# Patient Record
Sex: MALE | Race: OTHER | NOT HISPANIC OR LATINO | Employment: UNEMPLOYED | URBAN - METROPOLITAN AREA
[De-identification: names, ages, dates, MRNs, and addresses within clinical notes are randomized per-mention and may not be internally consistent; named-entity substitution may affect disease eponyms.]

---

## 2023-04-10 DIAGNOSIS — E78.5 HYPERLIPIDEMIA, UNSPECIFIED HYPERLIPIDEMIA TYPE: Primary | ICD-10-CM

## 2023-04-10 RX ORDER — ROSUVASTATIN CALCIUM 10 MG/1
10 TABLET, COATED ORAL NIGHTLY
Qty: 90 TABLET | Refills: 1 | Status: SHIPPED | OUTPATIENT
Start: 2023-04-10 | End: 2023-09-12

## 2023-04-10 RX ORDER — ROSUVASTATIN CALCIUM 10 MG/1
10 TABLET, COATED ORAL NIGHTLY
COMMUNITY
Start: 2022-02-17 | End: 2023-04-10 | Stop reason: SDUPTHER

## 2023-06-20 LAB
COMPLEMENT C3 (MG/DL) IN SER/PLAS: 145 MG/DL (ref 87–200)
COMPLEMENT C4 (MG/DL) IN SER/PLAS: 49 MG/DL (ref 10–50)

## 2023-06-22 LAB
ALBUMIN ELP: 4.6 G/DL (ref 3.4–5)
ALBUMIN/PROTEIN TOTAL (%) IN URINE BY ELECTROPHORESIS: 41.8 %
ALPHA 1 GLOBULIN/PROTEIN TOTAL (%) IN URINE BY ELECTROPHORESIS: 13.2 %
ALPHA 1: 0.3 G/DL (ref 0.2–0.6)
ALPHA 2 GLOBULIN/PROTEIN TOTAL (%) IN URINE BY ELECTROPHORESIS: 14.1 %
ALPHA 2: 0.6 G/DL (ref 0.4–1.1)
BETA GLOBULIN/PROTEIN TOTAL (%) IN URINE BY ELECTROPHORESIS: 20.5 %
BETA: 0.9 G/DL (ref 0.5–1.2)
GAMMA GLOBULIN/PROTEIN TOTAL (%) IN URINE BY ELECTROPHORESIS: 10.4 %
GAMMA GLOBULIN: 1.1 G/DL (ref 0.5–1.4)
IMMUNOFIXATION INTERPRETATION: NORMAL
PATH REVIEW - SERUM IMMUNOFIXATION: NORMAL
PATH REVIEW - URINE IMMUNOFIXATION: NORMAL
PATH REVIEW-SERUM PROTEIN ELECTROPHORESIS: NORMAL
PATH REVIEW-URINE PROTEIN ELECTROPHORESIS: NORMAL
PROTEIN (MG/DL) IN URINE: 12 MG/DL (ref 5–25)
PROTEIN ELECTROPHORESIS INTERPRETATION: NORMAL
PROTEIN TOTAL: 7.6 G/DL (ref 6.4–8.2)
SERUM IMMUNOFIXATION INTERPRETATION: NORMAL
UPEP INTERPRETATION: NORMAL

## 2023-06-23 LAB — GLOMERULAR BASEMENT MEMBRANE ANTIBODY: 0 AU/ML (ref 0–19)

## 2023-06-24 LAB
ANCA IFA PATTERN: NORMAL
ANCA IFA TITER: NORMAL
MYELOPEROXIDASE (MPO) AB, IGG: 0 AU/ML (ref 0–19)
SERINE PROTEINASE 3 (PR3) AB, IGG: 0 AU/ML (ref 0–19)

## 2023-06-27 LAB
CRYOCRIT IMMUNODIFFUSION (SJC): NORMAL
CRYOCRIT IMMUNOFIXATION (SJC): NORMAL
CRYOGLOBULIN  (SJC): NEGATIVE
PERCENT CRYOCRIT (SJC): NORMAL %
RHEUMATOID FACTOR (SJC): NORMAL IU/ML

## 2023-07-03 LAB
POC CREATININE: 0.7 MG/DL (ref 0.6–1.3)
POCT GFR - DATA CONVERSION: >60

## 2023-07-18 ENCOUNTER — APPOINTMENT (OUTPATIENT)
Dept: LAB | Facility: LAB | Age: 28
End: 2023-07-18
Payer: COMMERCIAL

## 2023-07-25 LAB
POC CREATININE: 0.6 MG/DL (ref 0.6–1.3)
POCT GFR - DATA CONVERSION: >60

## 2023-08-15 ENCOUNTER — HOSPITAL ENCOUNTER (OUTPATIENT)
Dept: DATA CONVERSION | Facility: HOSPITAL | Age: 28
End: 2023-08-15
Attending: SURGERY | Admitting: SURGERY
Payer: COMMERCIAL

## 2023-08-15 ENCOUNTER — HOSPITAL ENCOUNTER (OUTPATIENT)
Dept: DATA CONVERSION | Facility: HOSPITAL | Age: 28
End: 2023-08-15

## 2023-08-15 DIAGNOSIS — I72.2 ANEURYSM OF RENAL ARTERY (CMS-HCC): ICD-10-CM

## 2023-08-15 DIAGNOSIS — M31.4: ICD-10-CM

## 2023-09-11 DIAGNOSIS — E78.5 HYPERLIPIDEMIA, UNSPECIFIED HYPERLIPIDEMIA TYPE: ICD-10-CM

## 2023-09-12 RX ORDER — ROSUVASTATIN CALCIUM 10 MG/1
10 TABLET, COATED ORAL NIGHTLY
Qty: 90 TABLET | Refills: 1 | Status: SHIPPED | OUTPATIENT
Start: 2023-09-12 | End: 2023-12-28

## 2023-09-29 VITALS — BODY MASS INDEX: 19.32 KG/M2 | HEIGHT: 70 IN | WEIGHT: 134.92 LBS

## 2023-09-30 PROBLEM — E55.9 VITAMIN D DEFICIENCY: Status: ACTIVE | Noted: 2023-09-30

## 2023-09-30 PROBLEM — R74.8 ELEVATED LIVER ENZYMES: Status: ACTIVE | Noted: 2023-09-30

## 2023-09-30 PROBLEM — I70.1 RENAL ARTERY STENOSIS (CMS-HCC): Status: ACTIVE | Noted: 2023-09-30

## 2023-09-30 PROBLEM — R31.29 PERSISTENT MICROSCOPIC HEMATURIA: Status: ACTIVE | Noted: 2023-09-30

## 2023-09-30 PROBLEM — E78.5 HLD (HYPERLIPIDEMIA): Status: ACTIVE | Noted: 2023-09-30

## 2023-09-30 PROBLEM — Q87.81 ALPORT SYNDROME (HHS-HCC): Status: ACTIVE | Noted: 2023-09-30

## 2023-09-30 PROBLEM — M31.4: Status: ACTIVE | Noted: 2023-09-30

## 2023-09-30 PROBLEM — R20.2 PARESTHESIA OF RIGHT ARM: Status: ACTIVE | Noted: 2023-09-30

## 2023-09-30 PROBLEM — I10 HYPERTENSION, ESSENTIAL, BENIGN: Status: ACTIVE | Noted: 2023-09-30

## 2023-09-30 PROBLEM — M67.40 GANGLION CYST: Status: ACTIVE | Noted: 2023-09-30

## 2023-09-30 PROBLEM — I15.0 RENOVASCULAR HYPERTENSION: Status: ACTIVE | Noted: 2023-09-30

## 2023-09-30 PROBLEM — I72.2 RENAL ARTERIAL ANEURYSM (CMS-HCC): Status: ACTIVE | Noted: 2023-09-30

## 2023-09-30 PROBLEM — M35.9 CONNECTIVE TISSUE DISORDER (MULTI): Status: ACTIVE | Noted: 2023-09-30

## 2023-09-30 PROBLEM — E78.2 MIXED HYPERLIPIDEMIA: Status: ACTIVE | Noted: 2023-09-30

## 2023-09-30 PROBLEM — R31.29 OTHER MICROSCOPIC HEMATURIA: Status: ACTIVE | Noted: 2023-09-30

## 2023-09-30 PROBLEM — D17.1 LIPOMA OF TORSO: Status: ACTIVE | Noted: 2023-09-30

## 2023-09-30 PROBLEM — N28.1 RENAL CYST, LEFT: Status: ACTIVE | Noted: 2023-09-30

## 2023-09-30 PROBLEM — N02.9 RECURRENT MICROSCOPIC HEMATURIA: Status: ACTIVE | Noted: 2023-09-30

## 2023-09-30 PROBLEM — I10 HYPERTENSION: Status: ACTIVE | Noted: 2023-09-30

## 2023-09-30 PROBLEM — F41.1 GAD (GENERALIZED ANXIETY DISORDER): Status: ACTIVE | Noted: 2023-09-30

## 2023-09-30 PROBLEM — I10 WHITE COAT SYNDROME WITH HYPERTENSION: Status: ACTIVE | Noted: 2023-09-30

## 2023-09-30 PROBLEM — R79.89 ELEVATED LIVER FUNCTION TESTS: Status: ACTIVE | Noted: 2023-09-30

## 2023-09-30 PROBLEM — H93.A1 SUBJECTIVE PULSATILE TINNITUS OF RIGHT EAR: Status: ACTIVE | Noted: 2023-09-30

## 2023-09-30 PROBLEM — I73.9 CLAUDICATION (CMS-HCC): Status: ACTIVE | Noted: 2023-09-30

## 2023-09-30 RX ORDER — LISINOPRIL 20 MG/1
20 TABLET ORAL DAILY
COMMUNITY
Start: 2022-02-17 | End: 2023-10-10 | Stop reason: SINTOL

## 2023-09-30 RX ORDER — TRIAMCINOLONE ACETONIDE 55 UG/1
2 SPRAY, METERED NASAL DAILY
COMMUNITY

## 2023-09-30 RX ORDER — METHYLPHENIDATE HYDROCHLORIDE 5 MG/1
5 TABLET ORAL DAILY
COMMUNITY
End: 2023-10-10 | Stop reason: ALTCHOICE

## 2023-09-30 RX ORDER — ASPIRIN 81 MG/1
81 TABLET ORAL DAILY
COMMUNITY

## 2023-09-30 RX ORDER — CHLORHEXIDINE GLUCONATE ORAL RINSE 1.2 MG/ML
SOLUTION DENTAL
Status: ON HOLD | COMMUNITY
Start: 2023-08-10 | End: 2023-10-12 | Stop reason: ALTCHOICE

## 2023-09-30 RX ORDER — HERBAL DRUGS
TABLET ORAL
Status: ON HOLD | COMMUNITY
End: 2023-11-28 | Stop reason: ENTERED-IN-ERROR

## 2023-09-30 RX ORDER — CHLORHEXIDINE GLUCONATE 40 MG/ML
SOLUTION TOPICAL
Status: ON HOLD | COMMUNITY
Start: 2023-08-10 | End: 2023-10-12 | Stop reason: ALTCHOICE

## 2023-09-30 NOTE — H&P
History & Physical Reviewed:   I have reviewed the History and Physical dated:  19-Jul-2023   History and Physical reviewed and relevant findings noted. Patient examined to review pertinent physical  findings.: No significant changes   Home Medications Reviewed: no changes noted   Allergies Reviewed: no changes noted       ERAS (Enhanced Recovery After Surgery):  ·  ERAS Patient: no     Consent:   COVID-19 Consent:  ·  COVID-19 Risk Consent Surgeon has reviewed key risks related to the risk of trevon COVID-19 and if they contract COVID-19 what the risks are.     Attestation:   Note Completion:        Electronic Signatures:  Eris Gao (Resident))  (Signed 15-Aug-2023 09:03)   Authored: History & Physical Reviewed, ERAS, Consent,  Note Completion  oJvany Kelly)  (Signed 15-Aug-2023 11:52)   Authored: Note Completion   Co-Signer: History & Physical Reviewed, ERAS, Consent, Note Completion      Last Updated: 15-Aug-2023 11:52 by Jovany Kelly)

## 2023-10-01 NOTE — OP NOTE
PROCEDURE DETAILS    Preoperative Diagnosis:  mid-aortic syndrome  Postoperative Diagnosis:  mid-aortic syndrome  Surgeon: Jovany Kelly  Resident/Fellow/Other Assistant: Eris Gao    Procedure:  1. ultrasound guided access of right CFA  2. aortogram, diagnostic  3. selective angiography of right CARLITA  4. selective angiography of right accessory renal  Anesthesia: GETA  Estimated Blood Loss: 25cc  Findings: right renal fills from BELLE. small accessory right renal fills lower pole only.   Complications: none apparent  Patient Returned To/Condition: PACU awake hemodynamically stable                                Attestation:   Note Completion:  Attending Attestation I performed the procedure without a resident    Attending Attestation I performed the procedure without a resident          Electronic Signatures:  Eris Gao (Resident))  (Signed 15-Aug-2023 11:51)   Authored: Post-Operative Note, Chart Review, Note Completion  Jovany Kelly)  (Signed 15-Aug-2023 13:59)   Authored: Note Completion   Co-Signer: Post-Operative Note, Chart Review, Note Completion      Last Updated: 15-Aug-2023 13:59 by Jovany Kelly)

## 2023-10-01 NOTE — OP NOTE
PROCEDURE DETAILS    Preoperative Diagnosis:  Middle aortic syndrome, left renal artery aneurysm    Postoperative Diagnosis:  Middle aortic syndrome, left renal artery aneurysm    Surgeon: Jovany Kelly  Resident/Fellow/Other Assistant: Eris Gao    Procedure:  1. aortogram, selective renal arteriography, selective upper extremity arteriography  2. right femoral artery access with ultrasound guidance  Anesthesia: Griselda, [place]  Estimated Blood Loss: 5  Findings: R kidney 85% perfusion from right internal thoracic artery via phrenic collaterals, Right lower pole renal artery feeds 15%, L upper pole renal artery severe stenosis with aneurysm  feeding 15%,    Specimens(s) Collected: no,           Operative Report:   Supine position, both groins prepped and draped.  Right common femoral artery accessed under ultrasound guidance using local anesthesia consisting of lidocaine  2% mixed one-to-one with bupivacaine half percent.  Wire passage into thoracic aorta.  6 5 Bengali sheath placed.  Attaching a angled glide catheter to a pressure transducer, aortic pressures were measured at the arch with 100 mmHg systolic, at the mid  thoracic aorta at 100 mmHg systolic, at T12 at the diaphragm 100 mmHg systolic, and L4 at the bifurcation 103 mmHg systolic, and at the right iliac artery 104 mmHg.  Giving 200 mcg of nitroglycerin via the sheath, systemic pressure did drop.  All the  corresponding pressures dropped by roughly 10 mmHg and only a 5 mm gradient was recorded at L4 compared to mid aorta but this is not significant.  No significant aortic gradient seen across the narrowed fibrotic's mid aorta.  Catheter position at the  aortic root and arch aortogram performed locating a normal configuration of arch vessels which were patent.  Selective access of the right subclavian artery and right upper extremity arteriogram performed showing a right internal thoracic artery feeding  and collaterals across the diaphragm  into the right kidney which demonstrated a nephrogram encompassing approximately 85% of the right kidney.    Selection of the right internal thoracic artery and selective arteriography of the right AN revealed that the right kidney was fed from the right internal thoracic artery via collaterals to the phrenic artery and then to feeders into the right renal  artery.  Again as noted previously the nephrogram showed the majority of the right kidney being fed from this pathway.  A flush catheter was then repositioned in the abdominal aorta near the diaphragm and it demonstrated patency of the celiac axis and  superior mesenteric artery and while lumbars were well seen only the lower pole right renal artery was visualized on this aortogram and selection of this lower pole renal artery revealed it fed only 15% of the right lower pole of the kidney.    The left upper pole renal artery was aneurysmal beyond a very small origin but I was able to select this and get an Omni Flush catheter into the aneurysm and arteriogram showed filling of the aneurysm and the upper 15 to 25% of the left kidney.  The contrast  did not drain as the catheter was occlusive in the left upper pole renal artery suggesting a greater than 90% stenosis of the left upper pole renal artery.  The left lower pole renal artery was seen in multiple projections to be large and feeding the  majority of the left kidney up to 75%.    The catheter was reformed and captured and the access point was closed with a ProGlide suture.  The patient who had been heparinized at the start  of the procedure with 8000 units had this reversed with 20 mg of protamine.                        Electronic Signatures for Addendum Section:   Jovany Kelly) (Signed Addendum 16-Aug-2023 17:40)   Review of imaging on PACS shows that the nephrogram seen from right AN injection is seen behind motion of bowel and stool in right colon. The aortic injection  of contrast does reveal a  total right nephrogram as well suggesting shared flow to right kidney from the right upper pole artery which is narrowed at its origin on CTA but not well seen on the obtained imaging. This was not seen off the main monitors  during procedure but does not change the findings:    Right upper pole renal artery stenosis with collateral flow from right AN with perfusion of >80% of right kidney  Right lower pole renal artery perfusing about lower 15-20% of right kidney  Left upper pole renal artery stenosis with post-stenotic dilatation causing an aneurysm with flow to upper 15-40% of kidney  Left lower pole renal artery, patulous and perfusing >60% of right kidney     Electronic Signatures:  Jovany Kelly)  (Signed 15-Aug-2023 12:07)   Authored: Post-Operative Note, Chart Review, Note Completion      Last Updated: 16-Aug-2023 17:40 by Jovany Kelly)

## 2023-10-02 ENCOUNTER — TELEMEDICINE (OUTPATIENT)
Dept: CARDIOLOGY | Facility: HOSPITAL | Age: 28
End: 2023-10-02
Payer: COMMERCIAL

## 2023-10-02 ENCOUNTER — APPOINTMENT (OUTPATIENT)
Dept: CARDIOLOGY | Facility: HOSPITAL | Age: 28
End: 2023-10-02
Payer: COMMERCIAL

## 2023-10-02 DIAGNOSIS — Q87.81 ALPORT'S SYNDROME (HHS-HCC): ICD-10-CM

## 2023-10-02 DIAGNOSIS — I72.2 RENAL ARTERIAL ANEURYSM (CMS-HCC): ICD-10-CM

## 2023-10-02 DIAGNOSIS — I70.1 RENAL ARTERY STENOSIS (CMS-HCC): ICD-10-CM

## 2023-10-02 DIAGNOSIS — E78.2 MIXED HYPERLIPIDEMIA: Primary | ICD-10-CM

## 2023-10-02 DIAGNOSIS — I15.0 RENOVASCULAR HYPERTENSION: ICD-10-CM

## 2023-10-02 PROBLEM — I10 HYPERTENSION, ESSENTIAL, BENIGN: Status: RESOLVED | Noted: 2023-09-30 | Resolved: 2023-10-02

## 2023-10-02 PROCEDURE — 99214 OFFICE O/P EST MOD 30 MIN: CPT | Mod: 95 | Performed by: INTERNAL MEDICINE

## 2023-10-02 PROCEDURE — 99214 OFFICE O/P EST MOD 30 MIN: CPT | Performed by: INTERNAL MEDICINE

## 2023-10-02 RX ORDER — AMLODIPINE BESYLATE 5 MG/1
5 TABLET ORAL DAILY
Qty: 20 TABLET | Refills: 0 | Status: SHIPPED | OUTPATIENT
Start: 2023-10-02 | End: 2023-10-18 | Stop reason: SDUPTHER

## 2023-10-02 NOTE — PROGRESS NOTES
10/02/23  Cardiology/Vascular Medicine Follow-up    Virtual or Telephone Consent    An interactive audio and video telecommunication system which permits real time communications between the patient (at the originating site) and provider (at the distant site) was utilized to provide this telehealth service.   Verbal consent was requested and obtained from Patrick Mckeon on this date, 10/02/23 for a telehealth visit.       History Of Present Illness:    Patrick Mckeon is a terrific 28 y.o. male presenting with in follow-up of suspected renal artery stenosis/renovascular HTN.    He has complex renal artery anatomy with multiple renal arteries, left renal artery aneurysm. This is clearly  non atherosclerotic disease on the FMD/middle aortic syndrome spectrum.    He will be undergoing repeat renal angiography/renal vein renin sampling on 10.2.2023.    Since I saw him last in August, Bps OK overall on lisinopril 20 mg/day; he had ambulatory BP monitor done with mean /81 mm Hg.  He has not been hospitalized.    He is tolerating low dose aspirin without bleeding.    He has pulsatile tinnitus, unchanged. He does have leg sx right more so left leg, not classical for claudication; exercise ABIS done and were normal.    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    Last Recorded Vitals:  There were no vitals filed for this visit.    Past Medical History:  He has a past medical history of Bipolar disorder, currently in remission, most recent episode unspecified (CMS/[facility]) (02/17/2022).    Past Surgical History:  He has a past surgical history that includes Other surgical history (11/22/2022); CT angio abdomen pelvis w and or wo IV IV contrast (7/3/2023); CT angio head w and wo IV contrast (7/25/2023); CT angio neck w and wo IV contrast (7/25/2023); and IR angiogram renal bilateral (Bilateral, 8/15/2023).      Social History:  He has no  history on file for tobacco use, alcohol use, and drug use.    Family History:  Family History   Problem Relation Name Age of Onset    Coronary artery disease Father      Hyperlipidemia Father      Coronary artery disease Father's Sister      Other (CABG) Father's Sister      Coronary artery disease Paternal Grandmother      Heart attack Paternal Grandmother      Coronary artery disease Paternal Grandfather      Heart attack Paternal Grandfather          Allergies:  Amlodipine    Outpatient Medications:  Current Outpatient Medications   Medication Instructions    aspirin 81 mg, oral, Daily    cetirizine (ZYRTEC) 10 mg, oral, Daily    chlorhexidine (Hibiclens) 4 % external liquid USE AS DIRECTED.    chlorhexidine (Peridex) 0.12 % solution  RINSE MOUTH WITH 15ML (1 CAPFUL) FOR 30 SECONDS AM AND PM AFTER TOOTHBRUSHING. EXPECTORATE AFTER RINSING, DO NOT SWALLOW<BR>    ergocalciferol, vitamin D2, (VITAMIN D2 ORAL) Vitamin D    herbal drugs (Calmme) tablet oral    lisinopril 20 mg, oral, Daily    MAGNESIUM ORAL Magnesium    methylphenidate (RITALIN) 5 mg, oral, Daily    rosuvastatin (CRESTOR) 10 mg, oral, Nightly    triamcinolone (Nasacort) 55 mcg nasal inhaler nasal       Physical Exam:  He was in no distress  HEENT benign  Breathing non labored  He was alert, oriented, fluid speech  Appropriate affect     Last Labs:  CBC -  Lab Results   Component Value Date    WBC 8.0 08/10/2023    HGB 13.9 08/10/2023    HCT 43.8 08/10/2023    MCV 91 08/10/2023     08/10/2023       CMP -  Lab Results   Component Value Date    CALCIUM 9.4 08/10/2023    PROT 7.6 06/20/2023    ALBUMIN 5.0 11/16/2022    AST 26 11/16/2022    ALT 66 (H) 11/16/2022    ALKPHOS 58 11/16/2022    BILITOT 1.1 11/16/2022       LIPID PANEL -   Lab Results   Component Value Date    CHOL 130 11/16/2022    TRIG 57 11/16/2022    HDL 49.3 11/16/2022    CHHDL 2.6 11/16/2022    LDLF 69 11/16/2022    VLDL 11 11/16/2022       RENAL FUNCTION PANEL -   Lab Results    Component Value Date    GLUCOSE 88 08/10/2023     08/10/2023    K 4.7 08/10/2023     08/10/2023    CO2 24 08/10/2023    ANIONGAP 16 08/10/2023    BUN 9 08/10/2023    CREATININE 0.72 08/10/2023    GFRMALE >90 08/10/2023    CALCIUM 9.4 08/10/2023    ALBUMIN 5.0 11/16/2022        Lab Results   Component Value Date    HGBA1C 5.5 11/16/2022     Imaging review: I have  personally reviewed the result(s) Exercise YANELIS study  Ambulatory BP monitor    Prior CTA head/neck 7.25.2023  IMPRESSION:  Unremarkable CTA of the head and neck, specifically no imaging  findings to suggest a sequela of a collagen vascular disease.     I personally reviewed the images/study and I agree with the findings  as stated. This study was interpreted at [facility], [place], [place].     Prior CTA abdomen/pelvis  IMPRESSION:  1. There are 2 right renal arteries with more superior artery  demonstrating critical greater than 90% stenosis with post stenotic  dilatation measuring up to 0.6 cm in diameter. The inferior right  renal artery demonstrates no significant stenosis.  2. There are 2 left renal arteries with more superior artery  demonstrating critical greater than 90% stenosis with saccular  aneurysmal dilatation measuring 1.5 cm in length and 1.0 cm in  diameter distal to the stenosis. The inferior left renal artery  demonstrates no significant stenosis.    Assessment/Plan   28 year-old man with premature onset HTN, pulsatile tinnitus, microscopic hematuria. 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  This is non atherosclerotic SULAIMAN.  NF1 has been ruled out by genetic testing. 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    We have had a series of complex multispecialty discussion among the vascular team and have conferred with national colleagues.    Linda will undergo repeat renal angiography to attempt to discern more anatomic information on origin of his renal arteries (has large thoracic origin collateral) and also to undergo bilateral renal vein renin sampling. A complete revascularization procedure would be complex and major surgery.    We will hold lisinopril from tomorrow until the procedure; he will resume amlodipine 5 mg/day. He had leg swelling/discomfort on 10 mg/day; hopefully will be less on 5 mg/day but he will monitor BP a few times in the 10 days off medication to be sure we do not need to escalate.  His ambulatory BP monitor was reassuring.    I am very pleased his head/neck scan and ABIs with exercise are both OK.    RTC date/next steps to be determine based upon his angiogram and discussion with the multidisciplinary team.      Vivi Brown MD

## 2023-10-02 NOTE — PATIENT INSTRUCTIONS
Good to see you today Mr. Patrick NajerapeeweemarcoKhangmilvia   As we discussed, stop lisinopril as of tomorrow; take amlodipine 5 mg/day.  Monitor your BP a few times before the procedure on 10.12.2023 and let me know if elevated.

## 2023-10-02 NOTE — LETTER
October 2, 2023     Jovany Kelly MD  [id number]  [facility] to [facility] [id number]    Patient: Patrick Mckeon   YOB: 1995   Date of Visit: 10/2/2023       Dear Dr. Jovany Kelly MD:    Thank you for referring Patrick Mckeon to me for evaluation. Below are my notes for this consultation.  If you have questions, please do not hesitate to call me. I look forward to following your patient along with you.       Sincerely,     Vivi Brown MD      CC: No Recipients  ______________________________________________________________________________________    10/02/23  Cardiology/Vascular Medicine Follow-up    Virtual or Telephone Consent    An interactive audio and video telecommunication system which permits real time communications between the patient (at the originating site) and provider (at the distant site) was utilized to provide this telehealth service.   Verbal consent was requested and obtained from Patrick Mckeon on this date, 10/02/23 for a telehealth visit.       History Of Present Illness:    Patrick Mckeon is a terrific 28 y.o. male presenting with in follow-up of suspected renal artery stenosis/renovascular HTN.    He has complex renal artery anatomy with multiple renal arteries, left renal artery aneurysm. This is clearly  non atherosclerotic disease on the FMD/middle aortic syndrome spectrum.    He will be undergoing repeat renal angiography/renal vein renin sampling on 10.2.2023.    Since I saw him last in August, Bps OK overall on lisinopril 20 mg/day; he had ambulatory BP monitor done with mean /81 mm Hg.  He has not been hospitalized.    He is tolerating low dose aspirin without bleeding.    He has pulsatile tinnitus, unchanged. He does have leg sx right more so left leg, not classical for claudication; exercise ABIS done and were normal.    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    Last Recorded Vitals:  There were no vitals filed for this visit.    Past Medical History:  He has a past medical history of Bipolar disorder, currently in remission, most recent episode unspecified (CMS/[facility]) (02/17/2022).    Past Surgical History:  He has a past surgical history that includes Other surgical history (11/22/2022); CT angio abdomen pelvis w and or wo IV IV contrast (7/3/2023); CT angio head w and wo IV contrast (7/25/2023); CT angio neck w and wo IV contrast (7/25/2023); and IR angiogram renal bilateral (Bilateral, 8/15/2023).      Social History:  He has no history on file for tobacco use, alcohol use, and drug use.    Family History:  Family History   Problem Relation Name Age of Onset   • Coronary artery disease Father     • Hyperlipidemia Father     • Coronary artery disease Father's Sister     • Other (CABG) Father's Sister     • Coronary artery disease Paternal Grandmother     • Heart attack Paternal Grandmother     • Coronary artery disease Paternal Grandfather     • Heart attack Paternal Grandfather          Allergies:  Amlodipine    Outpatient Medications:  Current Outpatient Medications   Medication Instructions   • aspirin 81 mg, oral, Daily   • cetirizine (ZYRTEC) 10 mg, oral, Daily   • chlorhexidine (Hibiclens) 4 % external liquid USE AS DIRECTED.   • chlorhexidine (Peridex) 0.12 % solution  RINSE MOUTH WITH 15ML (1 CAPFUL) FOR 30 SECONDS AM AND PM AFTER TOOTHBRUSHING. EXPECTORATE AFTER RINSING, DO NOT SWALLOW<BR>   • ergocalciferol, vitamin D2, (VITAMIN D2 ORAL) Vitamin D   • herbal drugs (Calmme) tablet oral   • lisinopril 20 mg, oral, Daily   • MAGNESIUM ORAL Magnesium   • methylphenidate (RITALIN) 5 mg, oral, Daily   • rosuvastatin (CRESTOR) 10 mg, oral, Nightly   • triamcinolone (Nasacort) 55 mcg nasal inhaler nasal       Physical Exam:  He was in no distress  HEENT benign  Breathing non labored  He was alert, oriented, fluid speech  Appropriate affect     Last  Labs:  CBC -  Lab Results   Component Value Date    WBC 8.0 08/10/2023    HGB 13.9 08/10/2023    HCT 43.8 08/10/2023    MCV 91 08/10/2023     08/10/2023       CMP -  Lab Results   Component Value Date    CALCIUM 9.4 08/10/2023    PROT 7.6 06/20/2023    ALBUMIN 5.0 11/16/2022    AST 26 11/16/2022    ALT 66 (H) 11/16/2022    ALKPHOS 58 11/16/2022    BILITOT 1.1 11/16/2022       LIPID PANEL -   Lab Results   Component Value Date    CHOL 130 11/16/2022    TRIG 57 11/16/2022    HDL 49.3 11/16/2022    CHHDL 2.6 11/16/2022    LDLF 69 11/16/2022    VLDL 11 11/16/2022       RENAL FUNCTION PANEL -   Lab Results   Component Value Date    GLUCOSE 88 08/10/2023     08/10/2023    K 4.7 08/10/2023     08/10/2023    CO2 24 08/10/2023    ANIONGAP 16 08/10/2023    BUN 9 08/10/2023    CREATININE 0.72 08/10/2023    GFRMALE >90 08/10/2023    CALCIUM 9.4 08/10/2023    ALBUMIN 5.0 11/16/2022        Lab Results   Component Value Date    HGBA1C 5.5 11/16/2022     Imaging review: I have  personally reviewed the result(s) Exercise YANELIS study  Ambulatory BP monitor    Prior CTA head/neck 7.25.2023  IMPRESSION:  Unremarkable CTA of the head and neck, specifically no imaging  findings to suggest a sequela of a collagen vascular disease.     I personally reviewed the images/study and I agree with the findings  as stated. This study was interpreted at [facility], [place], [place].     Prior CTA abdomen/pelvis  IMPRESSION:  1. There are 2 right renal arteries with more superior artery  demonstrating critical greater than 90% stenosis with post stenotic  dilatation measuring up to 0.6 cm in diameter. The inferior right  renal artery demonstrates no significant stenosis.  2. There are 2 left renal arteries with more superior artery  demonstrating critical greater than 90% stenosis with saccular  aneurysmal dilatation measuring 1.5 cm in length and 1.0 cm in  diameter distal to the stenosis. The  inferior left renal artery  demonstrates no significant stenosis.    Assessment/Plan  28 year-old man with premature onset HTN, pulsatile tinnitus, microscopic hematuria. 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  This is non atherosclerotic SULAIMAN.  NF1 has been ruled out by genetic testing. 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    We have had a series of complex multispecialty discussion among the vascular team and have conferred with national colleagues.  Mr. Mckeon will undergo repeat renal angiography to attempt to discern more anatomic information on origin of his renal arteries (has large thoracic origin collateral) and also to undergo bilateral renal vein renin sampling. A complete revascularization procedure would be complex and major surgery.    We will hold lisinopril from tomorrow until the procedure; he will resume amlodipine 5 mg/day. He had leg swelling/discomfort on 10 mg/day; hopefully will be less on 5 mg/day but he will monitor BP a few times in the 10 days off medication to be sure we do not need to escalate.  His ambulatory BP monitor was reassuring.    I am very pleased his head/neck scan and ABIs with exercise are both OK.    RTC date/next steps to be determine based upon his angiogram and discussion with the multidisciplinary team.      Vivi Brown MD

## 2023-10-10 ENCOUNTER — OFFICE VISIT (OUTPATIENT)
Dept: PRIMARY CARE | Facility: CLINIC | Age: 28
End: 2023-10-10
Payer: COMMERCIAL

## 2023-10-10 ENCOUNTER — LAB (OUTPATIENT)
Dept: LAB | Facility: LAB | Age: 28
End: 2023-10-10
Payer: COMMERCIAL

## 2023-10-10 VITALS
BODY MASS INDEX: 20.54 KG/M2 | WEIGHT: 143.13 LBS | SYSTOLIC BLOOD PRESSURE: 166 MMHG | HEART RATE: 86 BPM | TEMPERATURE: 97.3 F | DIASTOLIC BLOOD PRESSURE: 98 MMHG

## 2023-10-10 DIAGNOSIS — I72.2 RENAL ARTERIAL ANEURYSM (CMS-HCC): ICD-10-CM

## 2023-10-10 DIAGNOSIS — N02.9 RECURRENT MICROSCOPIC HEMATURIA: ICD-10-CM

## 2023-10-10 DIAGNOSIS — E78.2 MIXED HYPERLIPIDEMIA: ICD-10-CM

## 2023-10-10 DIAGNOSIS — Q87.81 ALPORT SYNDROME (HHS-HCC): ICD-10-CM

## 2023-10-10 DIAGNOSIS — Z11.59 ENCOUNTER FOR HEPATITIS C SCREENING TEST FOR LOW RISK PATIENT: ICD-10-CM

## 2023-10-10 DIAGNOSIS — Z11.3 ROUTINE SCREENING FOR STI (SEXUALLY TRANSMITTED INFECTION): ICD-10-CM

## 2023-10-10 DIAGNOSIS — Z00.00 ENCOUNTER FOR PREVENTATIVE ADULT HEALTH CARE EXAMINATION: Primary | ICD-10-CM

## 2023-10-10 DIAGNOSIS — M35.9 CONNECTIVE TISSUE DISORDER (MULTI): ICD-10-CM

## 2023-10-10 DIAGNOSIS — R31.29 MICROSCOPIC HEMATURIA: ICD-10-CM

## 2023-10-10 DIAGNOSIS — I70.1 RENAL ARTERY STENOSIS (CMS-HCC): ICD-10-CM

## 2023-10-10 DIAGNOSIS — Z00.00 ENCOUNTER FOR PREVENTATIVE ADULT HEALTH CARE EXAMINATION: ICD-10-CM

## 2023-10-10 DIAGNOSIS — H93.A1 SUBJECTIVE PULSATILE TINNITUS OF RIGHT EAR: ICD-10-CM

## 2023-10-10 DIAGNOSIS — F33.1 MODERATE EPISODE OF RECURRENT MAJOR DEPRESSIVE DISORDER (MULTI): ICD-10-CM

## 2023-10-10 PROBLEM — M31.4: Status: RESOLVED | Noted: 2023-09-30 | Resolved: 2023-10-10

## 2023-10-10 PROBLEM — R79.89 ELEVATED LIVER FUNCTION TESTS: Status: RESOLVED | Noted: 2023-09-30 | Resolved: 2023-10-10

## 2023-10-10 LAB
25(OH)D3 SERPL-MCNC: 28 NG/ML (ref 30–100)
BASOPHILS # BLD AUTO: 0.04 X10*3/UL (ref 0–0.1)
BASOPHILS NFR BLD AUTO: 1.1 %
EOSINOPHIL # BLD AUTO: 0.09 X10*3/UL (ref 0–0.7)
EOSINOPHIL NFR BLD AUTO: 2.6 %
ERYTHROCYTE [DISTWIDTH] IN BLOOD BY AUTOMATED COUNT: 11.8 % (ref 11.5–14.5)
EST. AVERAGE GLUCOSE BLD GHB EST-MCNC: 108 MG/DL
HBA1C MFR BLD: 5.4 %
HCT VFR BLD AUTO: 48.1 % (ref 41–52)
HGB BLD-MCNC: 15.4 G/DL (ref 13.5–17.5)
HIV 1+2 AB+HIV1 P24 AG SERPL QL IA: NONREACTIVE
IMM GRANULOCYTES # BLD AUTO: 0.01 X10*3/UL (ref 0–0.7)
IMM GRANULOCYTES NFR BLD AUTO: 0.3 % (ref 0–0.9)
LYMPHOCYTES # BLD AUTO: 1.18 X10*3/UL (ref 1.2–4.8)
LYMPHOCYTES NFR BLD AUTO: 33.6 %
MCH RBC QN AUTO: 28.8 PG (ref 26–34)
MCHC RBC AUTO-ENTMCNC: 32 G/DL (ref 32–36)
MCV RBC AUTO: 90 FL (ref 80–100)
MONOCYTES # BLD AUTO: 0.32 X10*3/UL (ref 0.1–1)
MONOCYTES NFR BLD AUTO: 9.1 %
NEUTROPHILS # BLD AUTO: 1.87 X10*3/UL (ref 1.2–7.7)
NEUTROPHILS NFR BLD AUTO: 53.3 %
NRBC BLD-RTO: 0 /100 WBCS (ref 0–0)
PLATELET # BLD AUTO: 332 X10*3/UL (ref 150–450)
PMV BLD AUTO: 9.8 FL (ref 7.5–11.5)
RBC # BLD AUTO: 5.35 X10*6/UL (ref 4.5–5.9)
RBC #/AREA URNS AUTO: NORMAL /HPF
TSH SERPL-ACNC: 0.79 MIU/L (ref 0.44–3.98)
WBC # BLD AUTO: 3.5 X10*3/UL (ref 4.4–11.3)
WBC #/AREA URNS AUTO: NORMAL /HPF

## 2023-10-10 PROCEDURE — 85025 COMPLETE CBC W/AUTO DIFF WBC: CPT

## 2023-10-10 PROCEDURE — 86780 TREPONEMA PALLIDUM: CPT

## 2023-10-10 PROCEDURE — 87389 HIV-1 AG W/HIV-1&-2 AB AG IA: CPT

## 2023-10-10 PROCEDURE — 87340 HEPATITIS B SURFACE AG IA: CPT

## 2023-10-10 PROCEDURE — 81001 URINALYSIS AUTO W/SCOPE: CPT

## 2023-10-10 PROCEDURE — 83036 HEMOGLOBIN GLYCOSYLATED A1C: CPT

## 2023-10-10 PROCEDURE — 80053 COMPREHEN METABOLIC PANEL: CPT

## 2023-10-10 PROCEDURE — 3080F DIAST BP >= 90 MM HG: CPT | Performed by: INTERNAL MEDICINE

## 2023-10-10 PROCEDURE — 3077F SYST BP >= 140 MM HG: CPT | Performed by: INTERNAL MEDICINE

## 2023-10-10 PROCEDURE — 82306 VITAMIN D 25 HYDROXY: CPT

## 2023-10-10 PROCEDURE — 87800 DETECT AGNT MULT DNA DIREC: CPT

## 2023-10-10 PROCEDURE — 80061 LIPID PANEL: CPT

## 2023-10-10 PROCEDURE — 86803 HEPATITIS C AB TEST: CPT

## 2023-10-10 PROCEDURE — 1036F TOBACCO NON-USER: CPT | Performed by: INTERNAL MEDICINE

## 2023-10-10 PROCEDURE — 99214 OFFICE O/P EST MOD 30 MIN: CPT | Performed by: INTERNAL MEDICINE

## 2023-10-10 PROCEDURE — 84443 ASSAY THYROID STIM HORMONE: CPT

## 2023-10-10 PROCEDURE — 36415 COLL VENOUS BLD VENIPUNCTURE: CPT

## 2023-10-10 ASSESSMENT — PATIENT HEALTH QUESTIONNAIRE - PHQ9
7. TROUBLE CONCENTRATING ON THINGS, SUCH AS READING THE NEWSPAPER OR WATCHING TELEVISION: NEARLY EVERY DAY
8. MOVING OR SPEAKING SO SLOWLY THAT OTHER PEOPLE COULD HAVE NOTICED. OR THE OPPOSITE, BEING SO FIGETY OR RESTLESS THAT YOU HAVE BEEN MOVING AROUND A LOT MORE THAN USUAL: MORE THAN HALF THE DAYS
5. POOR APPETITE OR OVEREATING: SEVERAL DAYS
SUM OF ALL RESPONSES TO PHQ QUESTIONS 1-9: 15
SUM OF ALL RESPONSES TO PHQ9 QUESTIONS 1 & 2: 4
2. FEELING DOWN, DEPRESSED OR HOPELESS: MORE THAN HALF THE DAYS
10. IF YOU CHECKED OFF ANY PROBLEMS, HOW DIFFICULT HAVE THESE PROBLEMS MADE IT FOR YOU TO DO YOUR WORK, TAKE CARE OF THINGS AT HOME, OR GET ALONG WITH OTHER PEOPLE: EXTREMELY DIFFICULT
4. FEELING TIRED OR HAVING LITTLE ENERGY: MORE THAN HALF THE DAYS
3. TROUBLE FALLING OR STAYING ASLEEP: NEARLY EVERY DAY
9. THOUGHTS THAT YOU WOULD BE BETTER OFF DEAD, OR OF HURTING YOURSELF: NOT AT ALL
1. LITTLE INTEREST OR PLEASURE IN DOING THINGS: MORE THAN HALF THE DAYS
6. FEELING BAD ABOUT YOURSELF - OR THAT YOU ARE A FAILURE OR HAVE LET YOURSELF OR YOUR FAMILY DOWN: NOT AT ALL

## 2023-10-10 NOTE — ASSESSMENT & PLAN NOTE
Longstanding history, ;previously seen by psych NP interested in CBT prior to medical management. History of intolerance in the past. Will refer to I and reassess at next visit. PHQ9 performed.

## 2023-10-10 NOTE — H&P
Vascular Surgery Consultation, History, Physical    Patrick Mckeon is a 28 y.o. year old male patient with   referred for  .   History:   From clinic visit 8/23/2023 clipped from defunct EMR      Past Medical History:   Diagnosis Date    Bipolar disorder, currently in remission, most recent episode unspecified (CMS/HCC) 02/17/2022    History of depressed bipolar disorder       Past Surgical History:   Procedure Laterality Date    CT ABDOMEN PELVIS ANGIOGRAM W AND/OR WO IV CONTRAST  7/3/2023    CT ABDOMEN PELVIS ANGIOGRAM W AND/OR WO IV CONTRAST 7/3/2023 [facility]    CT HEAD ANGIO W AND WO IV CONTRAST  7/25/2023    CT HEAD ANGIO W AND WO IV CONTRAST 7/25/2023 [facility] CT    CT NECK ANGIO W AND WO IV CONTRAST  7/25/2023    CT NECK ANGIO W AND WO IV CONTRAST 7/25/2023 [facility] CT    IR ANGIOGRAM RENAL BILATERAL Bilateral 8/15/2023    IR ANGIOGRAM RENAL BILATERAL 8/15/2023 [facility]    OTHER SURGICAL HISTORY  11/22/2022    Lipoma excision       Active Ambulatory Problems     Diagnosis Date Noted    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/30/2023    Claudication (CMS/HCC) 09/30/2023    Connective tissue disorder (CMS/HCC) 09/30/2023    Elevated liver enzymes 09/30/2023    BO (generalized anxiety disorder) 09/30/2023    Ganglion cyst 09/30/2023    Mixed hyperlipidemia 09/30/2023    Lipoma of torso 09/30/2023    Recurrent microscopic hematuria 09/30/2023    Paresthesia of right arm 09/30/2023    Renal arterial aneurysm (CMS/HCC) 09/30/2023    Renal artery stenosis (CMS/HCC) 09/30/2023    Renal cyst, left 09/30/2023    Renovascular hypertension 09/30/2023    Subjective pulsatile tinnitus of right ear 09/30/2023    Vitamin D deficiency 09/30/2023    White coat syndrome with hypertension 09/30/2023    Moderate episode of recurrent major depressive disorder (CMS/HCC) 10/10/2023     Resolved Ambulatory Problems     Diagnosis Date Noted    Elevated liver function tests 09/30/2023    Hypertension, essential, benign 09/30/2023    Middle aortic  "syndrome (CMS/[facility]) 09/30/2023    Other microscopic hematuria 09/30/2023    Persistent microscopic hematuria 09/30/2023     Past Medical History:   Diagnosis Date    Bipolar disorder, currently in remission, most recent episode unspecified (CMS/[facility]) 02/17/2022       ROS    Allergies   Allergen Reactions    Amlodipine Other     swelling in lower extremities       Vitals:   There were no vitals taken for this visit.  Physical Exam:   Vascular Physical Exam  Constitutional:       General: He is awake.   Cardiovascular:      Rate and Rhythm: Normal rate.      Pulses:           Brachial pulses are 2+ on the right side and 2+ on the left side.       Femoral pulses are 2+ on the right side and 2+ on the left side.       Dorsalis pedis pulses are 2+ on the right side and 2+ on the left side.          Posterior tibial pulses are 2+ on the right side and 2+ on the left side.    Abdominal:      General: Abdomen is flat. Bowel sounds are normal.      Tenderness: There is no abdominal tenderness.   Musculoskeletal:      Neck: Full passive range of motion without pain.   Skin:     General: Skin is warm and dry.   Neurological:      Mental Status: He is alert.      Sensory: No sensory deficit.      Motor: No weakness.         Labs:  LABS:  CBC with Differential:  No results found for: \"WBC\", \"RBC\", \"HGB\", \"HCT\", \"PLT\", \"MCV\", \"MCH\", \"MCHC\", \"RDW\", \"NRBC\", \"SEGSPCT\", \"BANDSPCT\", \"BLASTSPCT\", \"METASPCT\", \"LYMPHOPCT\", \"PROMYELOPCT\", \"MONOPCT\", \"MYELOPCT\", \"EOSPCT\", \"BASOPCT\", \"MONOSABS\", \"LYMPHSABS\", \"EOSABS\", \"BASOSABS\", \"DIFFTYPE\"  CMP:  No results found for: \"NA\", \"K\", \"CL\", \"CO2\", \"BUN\", \"CREATININE\", \"AGRATIO\", \"GLUCOSE\", \"GLU\", \"PROT\", \"CALCIUM\", \"BILITOT\", \"ALKPHOS\", \"AST\", \"ALT\"  BMP:  No results found for: \"NA\", \"K\", \"CL\", \"CO2\", \"BUN\", \"CREATININE\", \"CALCIUM\", \"GLUCOSE\", \"GLU\"  Magnesium:No results found for: \"MG\"  Troponin:  No results found for: \"TROPHS\"  BNP: No results found for: " "\"BNP\"    @LABBRIEF(PR1,BMPR1A,INR,PROTIME,PTT,CHOL,LDLF,HDL)@    Imaging:   reviewed    Impression: Left renal artery aneurysm, right renal artery stenosis/FMD with complex collateralization from lumbars and internal thoracic artery    Plan: renal vein renin,       "

## 2023-10-10 NOTE — PROGRESS NOTES
Subjective   Patient ID: Patrick Mckeon is a 28 y.o. male who presents for Follow-up.  HPI  28-year-old male here for follow-up visit, last seen November of last year.    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 He has been following with Dr. Gunter recommended repeat renal angiography and bilateral renal vein sampling and consideration for revascularization. Genetics found her to have alport's syndrome but unclear if this is affecting her arteries though this is a likely cause of his recurrent hematuria. Has an upcoming appointment scheduled this Thursday.    Past medical history  - HTN - now on amlodipine, held lisinopril for now. Has been measuring home blood pressure reading yesterday 110/77, prior to that also well controlled. Has not noted significant leg swelling at present. Had 24 hour abpm performed averaging 130/80 while on lisinopril which corroborated with his home blood pressure readings.   - ADHD - previously on Adderall now discontinued, attributes LFT dysfunction due to this.   - Prediabetes - resolved. History of   - HLD on rosuvastatin 10mg with notable carotid artery plaque   - Migraine headaches - triggered by uncontrolled allergies.   - Allergies - takes zyrtec and benadryl daily for significant symptoms.   - Insomnia - chronic, sleep study in the past unremarkable.   - Depression/anxiety - seen by psych NP no longer following up, admits to feeling overwhelmed with medical conditions.   - Pulsatile tinnitus - resolved after discontinuation of lisinopril, no further tinnitus. Seen by audiology recommended ENT has not yet scheduled. 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   - Denies alcohol, tobacco or drug use. Sexually active with wife only.          Current Outpatient Medications   Medication Instructions    amLODIPine (NORVASC) 5 mg, oral, Daily    aspirin 81 mg, oral, Daily    cetirizine (ZYRTEC) 10 mg, oral, Daily    chlorhexidine (Hibiclens) 4 % external liquid USE AS DIRECTED.    chlorhexidine (Peridex) 0.12 % solution  RINSE MOUTH WITH 15ML (1 CAPFUL) FOR 30 SECONDS AM AND PM AFTER TOOTHBRUSHING. EXPECTORATE AFTER RINSING, DO NOT SWALLOW<BR>    ergocalciferol, vitamin D2, (VITAMIN D2 ORAL) Vitamin D    herbal drugs (Calmme) tablet oral    MAGNESIUM ORAL Magnesium    rosuvastatin (CRESTOR) 10 mg, oral, Nightly    triamcinolone (Nasacort) 55 mcg nasal inhaler nasal        Objective     BP (!) 166/98   Pulse 86   Temp 36.3 °C (97.3 °F)   Wt 64.9 kg (143 lb 2 oz)   BMI 20.54 kg/m²     Physical Exam    Assessment/Plan   Problem List Items Addressed This Visit       Alport syndrome    Relevant Orders    Albumin , Urine Random    Connective tissue disorder (CMS/HCC)     Newly diagnosed by genetics, autosomal dominant. Continue to monitor renal function, screen for albuminuria.          Mixed hyperlipidemia     Recheck lipid profile          Recurrent microscopic hematuria    Renal arterial aneurysm (CMS/HCC)     Pending consideration for further intervention, followed by vascular          Renal artery stenosis (CMS/HCC)     Follow up with vascular, continue aspirin, hold lisinopril for now as previously recommended.         Subjective pulsatile tinnitus of right ear     CT angio of head and neck no concerning abnormalities noted, has since resolved when discontinuing lisinopril. Encouraged followup with ENT. May consider further workup if recurs.          Moderate episode of recurrent major depressive disorder (CMS/HCC)     Longstanding history, ;previously seen by psych NP interested in  CBT prior to medical management. History of intolerance in the past. Will refer to [facility] and reassess at next visit. PHQ9 performed.          Relevant Orders    Follow Up In [facility]     Other Visit Diagnoses       Encounter for preventative adult health care examination    -  Primary    Relevant Orders    CBC and Auto Differential (Completed)    Comprehensive Metabolic Panel    Hemoglobin A1C (Completed)    Vitamin D 25-Hydroxy,Total (for eval of Vitamin D levels)    TSH with reflex to Free T4 if abnormal    Lipid Panel    Follow Up In [facility] - PCP - Health Maintenance    Routine screening for STI (sexually transmitted infection)        Relevant Orders    C. Trachomatis / N. Gonorrhoeae, Amplified Detection    Hepatitis B Surface Antigen    HIV 1/2 Antigen/Antibody Screen with Reflex to Confirmation (Completed)    Syphilis Screen with Reflex    Encounter for hepatitis C screening test for low risk patient        Relevant Orders    Hepatitis C Antibody    Microscopic hematuria        Relevant Orders    Urinalysis Microscopic Only        Followup 3 months for preventive care visit with labs prior

## 2023-10-10 NOTE — PATIENT INSTRUCTIONS
It was a pleasure to see you today! Here is a list of things we have discussed and to follow up on:    For depression/anxiety - I am referring you to our behavioral health therapist named CRISTINA. He should be reaching out to you.   I have ordered blood and/or urine tests for you to do today. The lab can be found on this floor (2nd floor) next to the pharmacy across from the elevators.    Schedule with ENT   Followup in 3 months for your physical.

## 2023-10-11 ENCOUNTER — TELEPHONE (OUTPATIENT)
Dept: VASCULAR MEDICINE | Facility: HOSPITAL | Age: 28
End: 2023-10-11
Payer: COMMERCIAL

## 2023-10-11 DIAGNOSIS — Q87.81 ALPORT SYNDROME (HHS-HCC): Primary | ICD-10-CM

## 2023-10-11 LAB
ALBUMIN SERPL BCP-MCNC: 5.1 G/DL (ref 3.4–5)
ALP SERPL-CCNC: 59 U/L (ref 33–120)
ALT SERPL W P-5'-P-CCNC: 37 U/L (ref 10–52)
ANION GAP SERPL CALC-SCNC: 13 MMOL/L (ref 10–20)
APPEARANCE UR: CLEAR
AST SERPL W P-5'-P-CCNC: 27 U/L (ref 9–39)
BILIRUB SERPL-MCNC: 0.8 MG/DL (ref 0–1.2)
BILIRUB UR STRIP.AUTO-MCNC: NEGATIVE MG/DL
BUN SERPL-MCNC: 11 MG/DL (ref 6–23)
C TRACH RRNA SPEC QL NAA+PROBE: NEGATIVE
CALCIUM SERPL-MCNC: 9.8 MG/DL (ref 8.6–10.6)
CHLORIDE SERPL-SCNC: 102 MMOL/L (ref 98–107)
CHOLEST SERPL-MCNC: 141 MG/DL (ref 0–199)
CHOLESTEROL/HDL RATIO: 2.4
CO2 SERPL-SCNC: 28 MMOL/L (ref 21–32)
COLOR UR: ABNORMAL
CREAT SERPL-MCNC: 0.82 MG/DL (ref 0.5–1.3)
GFR SERPL CREATININE-BSD FRML MDRD: >90 ML/MIN/1.73M*2
GLUCOSE SERPL-MCNC: 98 MG/DL (ref 74–99)
GLUCOSE UR STRIP.AUTO-MCNC: NEGATIVE MG/DL
HBV SURFACE AG SERPL QL IA: NONREACTIVE
HCV AB SER QL: NONREACTIVE
HDLC SERPL-MCNC: 59.6 MG/DL
KETONES UR STRIP.AUTO-MCNC: NEGATIVE MG/DL
LDLC SERPL CALC-MCNC: 69 MG/DL (ref 110–150)
LEUKOCYTE ESTERASE UR QL STRIP.AUTO: NEGATIVE
N GONORRHOEA DNA SPEC QL PROBE+SIG AMP: NEGATIVE
NITRITE UR QL STRIP.AUTO: NEGATIVE
NON HDL CHOLESTEROL: 81 MG/DL (ref 0–149)
PH UR STRIP.AUTO: 7 [PH]
POTASSIUM SERPL-SCNC: 4.2 MMOL/L (ref 3.5–5.3)
PROT SERPL-MCNC: 8.1 G/DL (ref 6.4–8.2)
PROT UR STRIP.AUTO-MCNC: NEGATIVE MG/DL
RBC # UR STRIP.AUTO: ABNORMAL /UL
SODIUM SERPL-SCNC: 139 MMOL/L (ref 136–145)
SP GR UR STRIP.AUTO: 1
T PALLIDUM AB SER QL: NONREACTIVE
TRIGL SERPL-MCNC: 64 MG/DL (ref 0–149)
UROBILINOGEN UR STRIP.AUTO-MCNC: <2 MG/DL
VLDL: 13 MG/DL (ref 0–40)

## 2023-10-11 NOTE — ASSESSMENT & PLAN NOTE
CT angio of head and neck no concerning abnormalities noted, has since resolved when discontinuing lisinopril. Encouraged followup with ENT. May consider further workup if recurs.

## 2023-10-11 NOTE — TELEPHONE ENCOUNTER
Spoke with patient about pre-procedure instructions for 10/12/23.  Patient advised to be NPO after midnight tonight; no meds to take in a.m. (states he held his BP med last cath lab procedure, will take afterwards); need for transportation.  Patient has labs from 10/10, kidney function WNL,  Patient verbalized understanding of all instructions.  Encouraged him to call with questions/concerns.

## 2023-10-11 NOTE — ASSESSMENT & PLAN NOTE
Newly diagnosed by genetics, autosomal dominant. Continue to monitor renal function, screen for albuminuria.

## 2023-10-12 ENCOUNTER — HOSPITAL ENCOUNTER (OUTPATIENT)
Facility: HOSPITAL | Age: 28
Setting detail: SURGERY ADMIT
Discharge: HOME | End: 2023-10-12
Attending: SURGERY | Admitting: SURGERY
Payer: COMMERCIAL

## 2023-10-12 ENCOUNTER — ANESTHESIA EVENT (OUTPATIENT)
Dept: CARDIOLOGY | Facility: HOSPITAL | Age: 28
End: 2023-10-12
Payer: COMMERCIAL

## 2023-10-12 ENCOUNTER — ANESTHESIA (OUTPATIENT)
Dept: CARDIOLOGY | Facility: HOSPITAL | Age: 28
End: 2023-10-12
Payer: COMMERCIAL

## 2023-10-12 VITALS
HEIGHT: 71 IN | HEART RATE: 72 BPM | DIASTOLIC BLOOD PRESSURE: 96 MMHG | TEMPERATURE: 97 F | OXYGEN SATURATION: 100 % | SYSTOLIC BLOOD PRESSURE: 129 MMHG | RESPIRATION RATE: 16 BRPM | BODY MASS INDEX: 19.88 KG/M2 | WEIGHT: 142 LBS

## 2023-10-12 DIAGNOSIS — I77.3: ICD-10-CM

## 2023-10-12 DIAGNOSIS — I15.0 RENOVASCULAR HYPERTENSION: ICD-10-CM

## 2023-10-12 DIAGNOSIS — I70.1 ATHEROSCLEROSIS OF RENAL ARTERY (CMS-HCC): ICD-10-CM

## 2023-10-12 DIAGNOSIS — I72.2 ANEURYSM OF RENAL ARTERY (CMS-HCC): Primary | ICD-10-CM

## 2023-10-12 PROCEDURE — 2720000007 HC OR 272 NO HCPCS: Performed by: SURGERY

## 2023-10-12 PROCEDURE — 76937 US GUIDE VASCULAR ACCESS: CPT | Performed by: SURGERY

## 2023-10-12 PROCEDURE — 2550000001 HC RX 255 CONTRASTS: Performed by: SURGERY

## 2023-10-12 PROCEDURE — 2500000004 HC RX 250 GENERAL PHARMACY W/ HCPCS (ALT 636 FOR OP/ED): Performed by: SURGERY

## 2023-10-12 PROCEDURE — 84244 ASSAY OF RENIN: CPT | Performed by: SURGERY

## 2023-10-12 PROCEDURE — 75893 VENOUS SAMPLING BY CATHETER: CPT | Performed by: SURGERY

## 2023-10-12 PROCEDURE — C1760 CLOSURE DEV, VASC: HCPCS | Performed by: SURGERY

## 2023-10-12 PROCEDURE — C1887 CATHETER, GUIDING: HCPCS | Performed by: SURGERY

## 2023-10-12 PROCEDURE — 2780000003 HC OR 278 NO HCPCS: Performed by: SURGERY

## 2023-10-12 PROCEDURE — 2500000005 HC RX 250 GENERAL PHARMACY W/O HCPCS: Performed by: ANESTHESIOLOGIST ASSISTANT

## 2023-10-12 PROCEDURE — 75825 VEIN X-RAY TRUNK: CPT | Performed by: SURGERY

## 2023-10-12 PROCEDURE — 7100000009 HC PHASE TWO TIME - INITIAL BASE CHARGE: Performed by: SURGERY

## 2023-10-12 PROCEDURE — 96373 THER/PROPH/DIAG INJ IA: CPT | Performed by: SURGERY

## 2023-10-12 PROCEDURE — A36245 PR PLACE CATH SELECT ART,ABD/PEL: Performed by: ANESTHESIOLOGIST ASSISTANT

## 2023-10-12 PROCEDURE — 36415 COLL VENOUS BLD VENIPUNCTURE: CPT | Performed by: SURGERY

## 2023-10-12 PROCEDURE — 75710 ARTERY X-RAYS ARM/LEG: CPT | Performed by: SURGERY

## 2023-10-12 PROCEDURE — 2500000005 HC RX 250 GENERAL PHARMACY W/O HCPCS: Performed by: SURGERY

## 2023-10-12 PROCEDURE — 2500000004 HC RX 250 GENERAL PHARMACY W/ HCPCS (ALT 636 FOR OP/ED): Performed by: ANESTHESIOLOGIST ASSISTANT

## 2023-10-12 PROCEDURE — A36245 PR PLACE CATH SELECT ART,ABD/PEL: Performed by: ANESTHESIOLOGY

## 2023-10-12 PROCEDURE — 36251 INS CATH REN ART 1ST UNILAT: CPT

## 2023-10-12 PROCEDURE — 36140 INTRO NDL ICATH UPR/LXTR ART: CPT | Performed by: SURGERY

## 2023-10-12 PROCEDURE — 7100000010 HC PHASE TWO TIME - EACH INCREMENTAL 1 MINUTE: Performed by: SURGERY

## 2023-10-12 PROCEDURE — 3700000002 HC GENERAL ANESTHESIA TIME - EACH INCREMENTAL 1 MINUTE: Performed by: SURGERY

## 2023-10-12 PROCEDURE — C1769 GUIDE WIRE: HCPCS | Performed by: SURGERY

## 2023-10-12 PROCEDURE — 75625 CONTRAST EXAM ABDOMINL AORTA: CPT | Performed by: SURGERY

## 2023-10-12 PROCEDURE — 3700000001 HC GENERAL ANESTHESIA TIME - INITIAL BASE CHARGE: Performed by: SURGERY

## 2023-10-12 RX ORDER — MIDAZOLAM HYDROCHLORIDE 1 MG/ML
INJECTION, SOLUTION INTRAMUSCULAR; INTRAVENOUS AS NEEDED
Status: DISCONTINUED | OUTPATIENT
Start: 2023-10-12 | End: 2023-10-12

## 2023-10-12 RX ORDER — HEPARIN SODIUM 1000 [USP'U]/ML
INJECTION, SOLUTION INTRAVENOUS; SUBCUTANEOUS AS NEEDED
Status: DISCONTINUED | OUTPATIENT
Start: 2023-10-12 | End: 2023-10-12 | Stop reason: HOSPADM

## 2023-10-12 RX ORDER — PROTAMINE SULFATE 10 MG/ML
INJECTION, SOLUTION INTRAVENOUS CONTINUOUS PRN
Status: COMPLETED | OUTPATIENT
Start: 2023-10-12 | End: 2023-10-12

## 2023-10-12 RX ORDER — PROPOFOL 10 MG/ML
INJECTION, EMULSION INTRAVENOUS AS NEEDED
Status: DISCONTINUED | OUTPATIENT
Start: 2023-10-12 | End: 2023-10-12

## 2023-10-12 RX ORDER — ONDANSETRON HYDROCHLORIDE 2 MG/ML
INJECTION, SOLUTION INTRAVENOUS AS NEEDED
Status: DISCONTINUED | OUTPATIENT
Start: 2023-10-12 | End: 2023-10-12

## 2023-10-12 RX ORDER — PROPOFOL 10 MG/ML
INJECTION, EMULSION INTRAVENOUS CONTINUOUS PRN
Status: DISCONTINUED | OUTPATIENT
Start: 2023-10-12 | End: 2023-10-12

## 2023-10-12 RX ORDER — FENTANYL CITRATE 50 UG/ML
INJECTION, SOLUTION INTRAMUSCULAR; INTRAVENOUS AS NEEDED
Status: DISCONTINUED | OUTPATIENT
Start: 2023-10-12 | End: 2023-10-12

## 2023-10-12 RX ORDER — SODIUM CHLORIDE 9 MG/ML
100 INJECTION, SOLUTION INTRAVENOUS CONTINUOUS
Status: DISCONTINUED | OUTPATIENT
Start: 2023-10-12 | End: 2023-10-12 | Stop reason: HOSPADM

## 2023-10-12 RX ORDER — DEXAMETHASONE SODIUM PHOSPHATE 100 MG/10ML
INJECTION INTRAMUSCULAR; INTRAVENOUS AS NEEDED
Status: DISCONTINUED | OUTPATIENT
Start: 2023-10-12 | End: 2023-10-12

## 2023-10-12 RX ORDER — LIDOCAINE HCL/PF 100 MG/5ML
SYRINGE (ML) INTRAVENOUS AS NEEDED
Status: DISCONTINUED | OUTPATIENT
Start: 2023-10-12 | End: 2023-10-12

## 2023-10-12 RX ORDER — IODIXANOL 320 MG/ML
INJECTION, SOLUTION INTRAVASCULAR AS NEEDED
Status: DISCONTINUED | OUTPATIENT
Start: 2023-10-12 | End: 2023-10-12 | Stop reason: HOSPADM

## 2023-10-12 RX ORDER — LIDOCAINE HYDROCHLORIDE 20 MG/ML
INJECTION, SOLUTION INFILTRATION; PERINEURAL AS NEEDED
Status: DISCONTINUED | OUTPATIENT
Start: 2023-10-12 | End: 2023-10-12 | Stop reason: HOSPADM

## 2023-10-12 RX ADMIN — MIDAZOLAM 3 MG: 1 INJECTION INTRAMUSCULAR; INTRAVENOUS at 09:25

## 2023-10-12 RX ADMIN — PROPOFOL 125 MCG/KG/MIN: 10 INJECTION, EMULSION INTRAVENOUS at 09:29

## 2023-10-12 RX ADMIN — LIDOCAINE HYDROCHLORIDE 100 MG: 20 INJECTION, SOLUTION INTRAVENOUS at 09:28

## 2023-10-12 RX ADMIN — PROPOFOL 30 MG: 10 INJECTION, EMULSION INTRAVENOUS at 09:40

## 2023-10-12 RX ADMIN — PROPOFOL 20 MG: 10 INJECTION, EMULSION INTRAVENOUS at 09:37

## 2023-10-12 RX ADMIN — SODIUM CHLORIDE: 9 INJECTION, SOLUTION INTRAVENOUS at 09:19

## 2023-10-12 RX ADMIN — MIDAZOLAM 1 MG: 1 INJECTION INTRAMUSCULAR; INTRAVENOUS at 09:32

## 2023-10-12 RX ADMIN — DEXAMETHASONE SODIUM PHOSPHATE 4 MG: 10 INJECTION INTRAMUSCULAR; INTRAVENOUS at 09:37

## 2023-10-12 RX ADMIN — ONDANSETRON HYDROCHLORIDE 4 MG: 2 INJECTION INTRAMUSCULAR; INTRAVENOUS at 10:20

## 2023-10-12 RX ADMIN — PROPOFOL 30 MG: 10 INJECTION, EMULSION INTRAVENOUS at 09:31

## 2023-10-12 RX ADMIN — FENTANYL CITRATE 50 MCG: 50 INJECTION, SOLUTION INTRAMUSCULAR; INTRAVENOUS at 09:30

## 2023-10-12 SDOH — HEALTH STABILITY: MENTAL HEALTH: CURRENT SMOKER: 0

## 2023-10-12 ASSESSMENT — COLUMBIA-SUICIDE SEVERITY RATING SCALE - C-SSRS
1. IN THE PAST MONTH, HAVE YOU WISHED YOU WERE DEAD OR WISHED YOU COULD GO TO SLEEP AND NOT WAKE UP?: NO
2. HAVE YOU ACTUALLY HAD ANY THOUGHTS OF KILLING YOURSELF?: NO
6. HAVE YOU EVER DONE ANYTHING, STARTED TO DO ANYTHING, OR PREPARED TO DO ANYTHING TO END YOUR LIFE?: NO

## 2023-10-12 ASSESSMENT — PAIN - FUNCTIONAL ASSESSMENT: PAIN_FUNCTIONAL_ASSESSMENT: 0-10

## 2023-10-12 ASSESSMENT — PAIN SCALES - GENERAL: PAINLEVEL_OUTOF10: 0 - NO PAIN

## 2023-10-12 NOTE — ANESTHESIA POSTPROCEDURE EVALUATION
Patient: Patrick Mckeon    Procedure Summary       Date: 10/12/23 Room / Location: [facility] CATH LAB 1 / Virtual [facility] Cardiac Cath Lab    Anesthesia Start: 0922 Anesthesia Stop: 1058    Procedure: Upper Extremity Angiogram (Bilateral) Diagnosis:       Aneurysm of renal artery (CMS/HCC)      Atherosclerosis of renal artery (CMS/HCC)      Fibromuscular dysplasia of right renal artery (CMS/HCC)      Renovascular hypertension      (Aneurysm of renal artery (CMS/HCC) [I72.2])      (Atherosclerosis of renal artery (CMS/HCC) [I70.1])    Providers: Jovany Kelly MD Responsible Provider: Arnel Paul MD    Anesthesia Type: MAC ASA Status: 2            Anesthesia Type: MAC    Vitals Value Taken Time   /90 10/12/23 1115   Temp 36.1 °C (97 °F) 10/12/23 1059   Pulse 78 10/12/23 1115   Resp 16 10/12/23 1115   SpO2 100 % 10/12/23 1115       Anesthesia Post Evaluation    Patient location during evaluation: PACU  Patient participation: complete - patient participated  Level of consciousness: awake  Pain management: adequate  Airway patency: patent  Cardiovascular status: acceptable  Respiratory status: acceptable  Hydration status: acceptable        There were no known notable events for this encounter.

## 2023-10-12 NOTE — Clinical Note
Sheath was exchanged in the right femoral vein with SHEATH, PINGURPREET, W/.038 GW 10CM, 5FR INTRODUCER, 2.5 CM DIALATOR.

## 2023-10-12 NOTE — DISCHARGE INSTRUCTIONS
- No heavy lifting/strenuous activity for 1 week  - No showers for 1 day  -Please monitor for increased swelling or bleeding of groin puncture site

## 2023-10-12 NOTE — ANESTHESIA PREPROCEDURE EVALUATION
Patient: Patrick Mckeon    Procedure Information       Date/Time: 10/12/23 0800    Procedure: Upper Extremity Angiogram (Bilateral)    Location: [facility] CATH LAB 1 / [facility] Cardiac Cath Lab    Providers: Jovany Kelly MD            Relevant Problems   Anesthesia (within normal limits)      Cardiovascular   (+) Mixed hyperlipidemia   (+) Renal artery stenosis (CMS/HCC)   (+) Renovascular hypertension   (+) White coat syndrome with hypertension      Endocrine (within normal limits)      GI (within normal limits)      /Renal   (+) Alport syndrome   (+) Renal cyst, left      Neuro/Psych   (+) BO (generalized anxiety disorder)   (+) Moderate episode of recurrent major depressive disorder (CMS/HCC)      Pulmonary (within normal limits)      GI/Hepatic (within normal limits)      Hematology (within normal limits)      Musculoskeletal (within normal limits)      Eyes, Ears, Nose, and Throat   (+) Alport syndrome       Clinical information reviewed:    Allergies  Meds               NPO Detail:  NPO/Void Status  Date of Last Liquid: 10/12/23  Time of Last Liquid: 0000  Date of Last Solid: 10/12/23  Time of Last Solid: 0000         Physical Exam    Airway  Mallampati: II  TM distance: <3 FB  Neck ROM: full     Cardiovascular    Dental    Pulmonary   Breath sounds clear to auscultation     Abdominal          Anesthesia Plan    ASA 2     MAC     The patient is not a current smoker.  Patient was not previously instructed to abstain from smoking on day of procedure.  Patient did not smoke on day of procedure.    intravenous induction   Anesthetic plan and risks discussed with patient.    Plan discussed with [facility].

## 2023-10-12 NOTE — POST-PROCEDURE NOTE
Patient discharge instructions reviewed. Patient verbalized understanding. Discharge via wheelchair to home , with belongings, stent card, accompanied by spouse.

## 2023-10-16 ENCOUNTER — TELEPHONE (OUTPATIENT)
Dept: CARDIOLOGY | Facility: HOSPITAL | Age: 28
End: 2023-10-16
Payer: COMMERCIAL

## 2023-10-16 LAB
RENIN PLAS-CCNC: 4.8 NG/ML/HR
RENIN PLAS-CCNC: 54.2 NG/ML/HR

## 2023-10-17 ENCOUNTER — OFFICE VISIT (OUTPATIENT)
Dept: VASCULAR SURGERY | Facility: CLINIC | Age: 28
End: 2023-10-17
Payer: COMMERCIAL

## 2023-10-17 VITALS — SYSTOLIC BLOOD PRESSURE: 152 MMHG | DIASTOLIC BLOOD PRESSURE: 98 MMHG | HEART RATE: 105 BPM

## 2023-10-17 DIAGNOSIS — I77.3 FIBROMUSCULAR DYSPLASIA OF BILATERAL RENAL ARTERIES (CMS-HCC): Primary | ICD-10-CM

## 2023-10-17 DIAGNOSIS — I72.2 ANEURYSM OF LEFT RENAL ARTERY (CMS-HCC): ICD-10-CM

## 2023-10-17 DIAGNOSIS — M31.4: ICD-10-CM

## 2023-10-17 DIAGNOSIS — I15.0 RENOVASCULAR HYPERTENSION: ICD-10-CM

## 2023-10-17 PROCEDURE — 99214 OFFICE O/P EST MOD 30 MIN: CPT | Performed by: SURGERY

## 2023-10-17 PROCEDURE — 3077F SYST BP >= 140 MM HG: CPT | Performed by: SURGERY

## 2023-10-17 PROCEDURE — 1036F TOBACCO NON-USER: CPT | Performed by: SURGERY

## 2023-10-17 PROCEDURE — 3080F DIAST BP >= 90 MM HG: CPT | Performed by: SURGERY

## 2023-10-17 RX ORDER — LISINOPRIL 20 MG/1
20 TABLET ORAL DAILY
Status: ON HOLD | COMMUNITY
End: 2023-12-04 | Stop reason: SDUPTHER

## 2023-10-17 ASSESSMENT — PATIENT HEALTH QUESTIONNAIRE - PHQ9
2. FEELING DOWN, DEPRESSED OR HOPELESS: NOT AT ALL
SUM OF ALL RESPONSES TO PHQ9 QUESTIONS 1 & 2: 0
1. LITTLE INTEREST OR PLEASURE IN DOING THINGS: NOT AT ALL

## 2023-10-17 ASSESSMENT — PAIN SCALES - GENERAL: PAINLEVEL: 2

## 2023-10-17 NOTE — PROGRESS NOTES
Patient comes in for discussion of findings from last weeks arteriogram and venogram.  His arteriogram shows that his right kidney gains much of his perfusion from collaterals derived from the right internal thoracic artery.  The right internal thoracic artery beats into phrenic vessels that may constitute a large retroperitoneal collateral on the right side that is fairly linear and of sufficient caliber to feed the right kidney through 2 truncal arteries that supply the upper and middle segments of the right kidney.  The direct selective arteriograms of the right upper and lower pole renal arteries off of the aorta show that the upper pole renal artery dives into a nest of very tortuous collaterals that ultimately reconstitute the truncal upper and middle renal arteries on the kidney.  Direct selection of the right lower pole renal artery off of the aorta shows a direct action to the lowermost segment of right kidney and feeding of collaterals to the truncal upper and middle renal arteries feeding the majority of the kidney.    This tortuous and complicated perfusion of the right kidney results in renovascular hypertension as evidenced by selective renin values taken from the right renal vein showing a value of 54.2 ng/mL/h.  A sample taken from a peripheral arm vein shows a value of 4.8 ng/mL/h.  This results in a calculated ratio of 11.29.    I spoke with the patient in detail.  Options remain a single operation performed via laparotomy to address the left renal artery aneurysm and the situation on the right kidney.  Through a single laparotomy the right kidney can be exposed and a decision can be made intraoperatively as to whether directly revascularize the right kidney with bypasses to the truncal middle and upper arteries versus explantation, preservation, and ex vivo back table repair of the right kidney with subsequent autotransplantation into the right pelvis.  I spoke with my transplant surgery colleague  and we will have them available for this operation.  The other option which I have yet to fully discussed with the department would be a staged operation with repair of the left renal aneurysm followed by a staged right retroperitoneal operation on the right kidney which offers the advantage of better exposure and control particularly due to the nest of collaterals.  This does not preclude the possibility of an ileal renal bypass but does involve 2 operations versus a single procedure.    We will schedule for the single-stage operation but will continue to discuss this highly unusual and rare case with colleagues within the department and outside.    Total time with patient was 45 minutes

## 2023-10-18 ENCOUNTER — DOCUMENTATION (OUTPATIENT)
Dept: PREOP | Facility: HOSPITAL | Age: 28
End: 2023-10-18
Payer: COMMERCIAL

## 2023-10-18 DIAGNOSIS — I15.0 RENOVASCULAR HYPERTENSION: ICD-10-CM

## 2023-10-18 RX ORDER — AMLODIPINE BESYLATE 5 MG/1
5 TABLET ORAL DAILY
Qty: 30 TABLET | Refills: 2 | Status: SHIPPED | OUTPATIENT
Start: 2023-10-18 | End: 2023-10-19 | Stop reason: ALTCHOICE

## 2023-10-19 ENCOUNTER — APPOINTMENT (OUTPATIENT)
Dept: RADIOLOGY | Facility: HOSPITAL | Age: 28
End: 2023-10-19
Payer: COMMERCIAL

## 2023-10-19 ENCOUNTER — HOSPITAL ENCOUNTER (EMERGENCY)
Facility: HOSPITAL | Age: 28
Discharge: HOME | End: 2023-10-20
Attending: EMERGENCY MEDICINE
Payer: COMMERCIAL

## 2023-10-19 DIAGNOSIS — K21.9 GASTROESOPHAGEAL REFLUX DISEASE, UNSPECIFIED WHETHER ESOPHAGITIS PRESENT: ICD-10-CM

## 2023-10-19 DIAGNOSIS — R07.89 CHEST WALL PAIN: Primary | ICD-10-CM

## 2023-10-19 DIAGNOSIS — F41.9 ANXIETY: ICD-10-CM

## 2023-10-19 LAB
ALBUMIN SERPL BCP-MCNC: 4.8 G/DL (ref 3.4–5)
ALP SERPL-CCNC: 64 U/L (ref 33–120)
ALT SERPL W P-5'-P-CCNC: 40 U/L (ref 10–52)
ANION GAP SERPL CALC-SCNC: 14 MMOL/L (ref 10–20)
AST SERPL W P-5'-P-CCNC: 23 U/L (ref 9–39)
BASOPHILS # BLD AUTO: 0.03 X10*3/UL (ref 0–0.1)
BASOPHILS NFR BLD AUTO: 0.5 %
BILIRUB SERPL-MCNC: 0.6 MG/DL (ref 0–1.2)
BUN SERPL-MCNC: 13 MG/DL (ref 6–23)
CALCIUM SERPL-MCNC: 9.2 MG/DL (ref 8.6–10.3)
CARDIAC TROPONIN I PNL SERPL HS: 5 NG/L (ref 0–20)
CHLORIDE SERPL-SCNC: 102 MMOL/L (ref 98–107)
CO2 SERPL-SCNC: 27 MMOL/L (ref 21–32)
CREAT SERPL-MCNC: 0.76 MG/DL (ref 0.5–1.3)
EOSINOPHIL # BLD AUTO: 0.09 X10*3/UL (ref 0–0.7)
EOSINOPHIL NFR BLD AUTO: 1.5 %
ERYTHROCYTE [DISTWIDTH] IN BLOOD BY AUTOMATED COUNT: 11.9 % (ref 11.5–14.5)
GFR SERPL CREATININE-BSD FRML MDRD: >90 ML/MIN/1.73M*2
GLUCOSE SERPL-MCNC: 87 MG/DL (ref 74–99)
HCT VFR BLD AUTO: 42.4 % (ref 41–52)
HGB BLD-MCNC: 14.3 G/DL (ref 13.5–17.5)
IMM GRANULOCYTES # BLD AUTO: 0 X10*3/UL (ref 0–0.7)
IMM GRANULOCYTES NFR BLD AUTO: 0 % (ref 0–0.9)
INR PPP: 1 (ref 0.9–1.1)
LYMPHOCYTES # BLD AUTO: 2.31 X10*3/UL (ref 1.2–4.8)
LYMPHOCYTES NFR BLD AUTO: 39.3 %
MCH RBC QN AUTO: 28.7 PG (ref 26–34)
MCHC RBC AUTO-ENTMCNC: 33.7 G/DL (ref 32–36)
MCV RBC AUTO: 85 FL (ref 80–100)
MONOCYTES # BLD AUTO: 0.63 X10*3/UL (ref 0.1–1)
MONOCYTES NFR BLD AUTO: 10.7 %
NEUTROPHILS # BLD AUTO: 2.82 X10*3/UL (ref 1.2–7.7)
NEUTROPHILS NFR BLD AUTO: 48 %
NRBC BLD-RTO: 0 /100 WBCS (ref 0–0)
PLATELET # BLD AUTO: 287 X10*3/UL (ref 150–450)
PMV BLD AUTO: 9.1 FL (ref 7.5–11.5)
POTASSIUM SERPL-SCNC: 3.6 MMOL/L (ref 3.5–5.3)
PROT SERPL-MCNC: 7.7 G/DL (ref 6.4–8.2)
PROTHROMBIN TIME: 11.4 SECONDS (ref 9.8–12.8)
RBC # BLD AUTO: 4.98 X10*6/UL (ref 4.5–5.9)
SODIUM SERPL-SCNC: 139 MMOL/L (ref 136–145)
WBC # BLD AUTO: 5.9 X10*3/UL (ref 4.4–11.3)

## 2023-10-19 PROCEDURE — 99284 EMERGENCY DEPT VISIT MOD MDM: CPT | Performed by: EMERGENCY MEDICINE

## 2023-10-19 PROCEDURE — 80053 COMPREHEN METABOLIC PANEL: CPT | Performed by: EMERGENCY MEDICINE

## 2023-10-19 PROCEDURE — 36415 COLL VENOUS BLD VENIPUNCTURE: CPT | Performed by: EMERGENCY MEDICINE

## 2023-10-19 PROCEDURE — 71045 X-RAY EXAM CHEST 1 VIEW: CPT | Mod: FOREIGN READ | Performed by: RADIOLOGY

## 2023-10-19 PROCEDURE — 85610 PROTHROMBIN TIME: CPT | Performed by: EMERGENCY MEDICINE

## 2023-10-19 PROCEDURE — 71045 X-RAY EXAM CHEST 1 VIEW: CPT | Mod: FY,FR

## 2023-10-19 PROCEDURE — 84484 ASSAY OF TROPONIN QUANT: CPT | Performed by: EMERGENCY MEDICINE

## 2023-10-19 PROCEDURE — 85025 COMPLETE CBC W/AUTO DIFF WBC: CPT | Performed by: EMERGENCY MEDICINE

## 2023-10-19 PROCEDURE — 99283 EMERGENCY DEPT VISIT LOW MDM: CPT | Mod: 25

## 2023-10-19 RX ORDER — HYDROXYZINE HYDROCHLORIDE 25 MG/1
25 TABLET, FILM COATED ORAL DAILY
Qty: 10 TABLET | Refills: 0 | Status: SHIPPED | OUTPATIENT
Start: 2023-10-19 | End: 2024-01-22 | Stop reason: ALTCHOICE

## 2023-10-19 RX ORDER — OMEPRAZOLE 20 MG/1
20 CAPSULE, DELAYED RELEASE ORAL DAILY
Qty: 30 CAPSULE | Refills: 0 | Status: SHIPPED | OUTPATIENT
Start: 2023-10-19 | End: 2023-11-17 | Stop reason: ALTCHOICE

## 2023-10-19 ASSESSMENT — PAIN SCALES - GENERAL: PAINLEVEL_OUTOF10: 0 - NO PAIN

## 2023-10-19 ASSESSMENT — COLUMBIA-SUICIDE SEVERITY RATING SCALE - C-SSRS
2. HAVE YOU ACTUALLY HAD ANY THOUGHTS OF KILLING YOURSELF?: NO
1. IN THE PAST MONTH, HAVE YOU WISHED YOU WERE DEAD OR WISHED YOU COULD GO TO SLEEP AND NOT WAKE UP?: NO
6. HAVE YOU EVER DONE ANYTHING, STARTED TO DO ANYTHING, OR PREPARED TO DO ANYTHING TO END YOUR LIFE?: NO

## 2023-10-19 ASSESSMENT — PAIN - FUNCTIONAL ASSESSMENT: PAIN_FUNCTIONAL_ASSESSMENT: 0-10

## 2023-10-19 ASSESSMENT — PAIN DESCRIPTION - DESCRIPTORS
DESCRIPTORS: OTHER (COMMENT)
DESCRIPTORS: TIGHTNESS

## 2023-10-19 ASSESSMENT — PAIN DESCRIPTION - FREQUENCY: FREQUENCY: INTERMITTENT

## 2023-10-19 ASSESSMENT — PAIN DESCRIPTION - LOCATION: LOCATION: CHEST

## 2023-10-19 ASSESSMENT — PAIN DESCRIPTION - PAIN TYPE: TYPE: ACUTE PAIN

## 2023-10-20 VITALS
DIASTOLIC BLOOD PRESSURE: 93 MMHG | RESPIRATION RATE: 16 BRPM | TEMPERATURE: 99 F | HEART RATE: 69 BPM | HEIGHT: 70 IN | WEIGHT: 142 LBS | BODY MASS INDEX: 20.33 KG/M2 | SYSTOLIC BLOOD PRESSURE: 136 MMHG | OXYGEN SATURATION: 98 %

## 2023-10-20 LAB — SARS-COV-2 RNA RESP QL NAA+PROBE: NOT DETECTED

## 2023-10-20 PROCEDURE — 87635 SARS-COV-2 COVID-19 AMP PRB: CPT

## 2023-10-20 ASSESSMENT — PAIN SCALES - GENERAL: PAINLEVEL_OUTOF10: 0 - NO PAIN

## 2023-10-20 NOTE — ED PROVIDER NOTES
HPI   Chief Complaint   Patient presents with    Chest Pain       Patient is a 28-year-old male with past medical history of renal artery stenosis, hypertension, hyperlipidemia, and anxiety who presents to t with concerns for he ED chest tightness.  Patient states he has had intermittent chest tightness for several days over the last week most recent episode was 30 minutes prior to arrival to our ED.  Patient did have an angiogram done recently on his right side due to his renal artery stenosis and has another procedure scheduled for November 28.  He denies any shortness of breath.  Patient states the chest tightness is not related to activity he is noticing more when he is laying in bed or when he is driving.  He also noticed some increased palpitations yesterday while he was showering however states that has since resolved.  Patient states he does not have any symptoms currently.  Denies any nausea or vomiting.  He denies any shortness of breath.  He is not on any medications for anxiety.  He had recent medication changes made from amlodipine to lisinopril earlier today.  His last dose of amlodipine was yesterday and he started back on lisinopril today.  He has been in contact with a cardiologist in regard to his symptoms and management of his high blood pressure.  No other associated signs or symptoms reported at this time.      History provided by:  Patient                      Gertrude Coma Scale Score: 15                  Patient History   Past Medical History:   Diagnosis Date    Bipolar disorder, currently in remission, most recent episode unspecified (CMS/[facility]) 02/17/2022    History of depressed bipolar disorder     Past Surgical History:   Procedure Laterality Date    CT ABDOMEN PELVIS ANGIOGRAM W AND/OR WO IV CONTRAST  7/3/2023    CT ABDOMEN PELVIS ANGIOGRAM W AND/OR WO IV CONTRAST 7/3/2023 [facility] CT    CT HEAD ANGIO W AND WO IV CONTRAST  7/25/2023    CT HEAD ANGIO W AND WO IV CONTRAST 7/25/2023 [facility] CT    CT  NECK ANGIO W AND WO IV CONTRAST  7/25/2023    CT NECK ANGIO W AND WO IV CONTRAST 7/25/2023 [facility] CT    INVASIVE VASCULAR PROCEDURE Bilateral 10/12/2023    Procedure: Upper Extremity Angiogram;  Surgeon: Jovany Kelly MD;  Location: [facility] Cardiac Cath Lab;  Service: Vascular Surgery;  Laterality: Bilateral;    IR ANGIOGRAM RENAL BILATERAL Bilateral 8/15/2023    IR ANGIOGRAM RENAL BILATERAL 8/15/2023 [facility]    OTHER SURGICAL HISTORY  11/22/2022    Lipoma excision     Family History   Problem Relation Name Age of Onset    Coronary artery disease Father      Hyperlipidemia Father      Coronary artery disease Father's Sister      Other (CABG) Father's Sister      Coronary artery disease Paternal Grandmother      Heart attack Paternal Grandmother      Coronary artery disease Paternal Grandfather      Heart attack Paternal Grandfather       Social History     Tobacco Use    Smoking status: Never    Smokeless tobacco: Never   Substance Use Topics    Alcohol use: Not Currently    Drug use: Never       Physical Exam   ED Triage Vitals [10/19/23 2049]   Temp Heart Rate Resp BP   37.2 °C (99 °F) 86 20 (!) 182/97      SpO2 Temp Source Heart Rate Source Patient Position   99 % Oral -- --      BP Location FiO2 (%)     -- --       Physical Exam  Constitutional:       General: He is not in acute distress.     Appearance: Normal appearance. He is not toxic-appearing.   HENT:      Head: Normocephalic and atraumatic.      Mouth/Throat:      Mouth: Mucous membranes are moist.   Eyes:      General: No scleral icterus.     Conjunctiva/sclera: Conjunctivae normal.   Cardiovascular:      Rate and Rhythm: Normal rate and regular rhythm.      Pulses: Normal pulses.      Heart sounds: Murmur heard.   Pulmonary:      Effort: Pulmonary effort is normal.      Breath sounds: Normal breath sounds.   Chest:      Chest wall: No mass, deformity, tenderness, crepitus or edema. There is no dullness to percussion.   Abdominal:      General:  There is no distension.      Palpations: Abdomen is soft.      Tenderness: There is no abdominal tenderness.   Musculoskeletal:         General: Normal range of motion.      Cervical back: Normal range of motion and neck supple.   Skin:     General: Skin is warm and dry.      Capillary Refill: Capillary refill takes less than 2 seconds.   Neurological:      General: No focal deficit present.      Mental Status: He is alert and oriented to person, place, and time.   Psychiatric:         Mood and Affect: Mood normal.         Behavior: Behavior normal.         Thought Content: Thought content normal.         Judgment: Judgment normal.         ED Course & MDM   ED Course as of 10/19/23 2235   u Oct 19, 2023   2134 BP(!): 182/97 [KD]   2234 XR chest 1 view  X-ray reviewed shows no widened mediastinum, no evidence of pneumothorax, no pulmonary effusions or edema. [KD]   2234 ECG 12 lead [KD]      ED Course User Index  [KD] Lalitha Eddy DO         Diagnoses as of 10/19/23 2235   Chest wall pain   Anxiety   Gastroesophageal reflux disease, unspecified whether esophagitis present       Medical Decision Making  Patient is a 28-year-old male with a past medical history of renal artery stenosis, hypertension, hyperlipidemia and anxiety in addition to some history of GERD who presents to the ED today with concerns for chest wall tightness.  Patient states she has had intermittent episodes not associated with exertion.  Patient is currently asymptomatic.  Vital signs upon arrival reviewed and notable for hypertension blood pressure was 182/97.  Patient afebrile not tachycardic and satting well on room air.  Patient denies any shortness of breath as well.  Patient states he has noticed the pain/tightness more when he is resting or driving.  He also states he has been feeling anxious with his upcoming procedure.  He was also switched from amlodipine to lisinopril due to the side effects he was experiencing with amlodipine.  " Patient's physical exam including chest wall tenderness is completely negative.  I think this is a component of anxiety and GERD as patient states his symptoms resolved with Tums yesterday.  It is very unlikely that this is a PE, ACS, pneumothorax, esophageal rupture or aortic dissection.  Patient's EKG showed LVH otherwise unremarkable.  Patient's chest x-ray showed a narrow mediastinum no evidence of pulmonary infiltrates or edema.  Patient's labs including a troponin negative.  Patient is stable at this time.  He is amenable for discharge.  He was given a prescription of hydroxyzine for anxiety in addition to a PPI to help with his GERD and recommend that he follow-up with his primary care doctor to get continuous management of his symptoms.  I also provided the patient with return precautions to the ED for which she verbalized understanding.  They do not think there is any emergent or acute medical condition at this time that would require the patient to be admitted or to be put in the CDU.  Patient is agreeable with the plan and is ready for discharge.    The patient was seen and staffed with Dr Jolley.  Lalitha \"Giovany\" Nunu CUMMINGS  Emergency Medicine PGY-1   [place]    Amount and/or Complexity of Data Reviewed  Labs: ordered. Decision-making details documented in ED Course.  Radiology: ordered and independent interpretation performed. Decision-making details documented in ED Course.  ECG/medicine tests: ordered and independent interpretation performed. Decision-making details documented in ED Course.        Procedure  Procedures     Lalitha Eddy DO  Resident  10/19/23 8787    "

## 2023-10-20 NOTE — ED TRIAGE NOTES
Pt c/o chest tightness x 2 days. Pt states symptoms have been intermittent. States nothing he does makes it better/worse. Denies SOB. Pt states angiogram last Thursday for renal artery stenosis, went through right groin, states he is going to be having a double bypass on the right kidney in the next 2 wks. States currently no pain, 30mins pta he was having pain. Pt states he has been having increase in anxiety this past week.

## 2023-10-31 ENCOUNTER — APPOINTMENT (OUTPATIENT)
Dept: CARDIOLOGY | Facility: HOSPITAL | Age: 28
End: 2023-10-31
Payer: COMMERCIAL

## 2023-11-06 ENCOUNTER — HOSPITAL ENCOUNTER (OUTPATIENT)
Dept: VASCULAR MEDICINE | Facility: HOSPITAL | Age: 28
Discharge: HOME | End: 2023-11-06
Payer: COMMERCIAL

## 2023-11-06 PROCEDURE — 93970 EXTREMITY STUDY: CPT

## 2023-11-06 PROCEDURE — 93970 EXTREMITY STUDY: CPT | Performed by: STUDENT IN AN ORGANIZED HEALTH CARE EDUCATION/TRAINING PROGRAM

## 2023-11-07 ENCOUNTER — APPOINTMENT (OUTPATIENT)
Dept: PREADMISSION TESTING | Facility: HOSPITAL | Age: 28
End: 2023-11-07
Payer: COMMERCIAL

## 2023-11-08 ENCOUNTER — SOCIAL WORK (OUTPATIENT)
Dept: PRIMARY CARE | Facility: CLINIC | Age: 28
End: 2023-11-08
Payer: COMMERCIAL

## 2023-11-08 ASSESSMENT — PATIENT HEALTH QUESTIONNAIRE - PHQ9
SUM OF ALL RESPONSES TO PHQ QUESTIONS 1-9: 18
SUM OF ALL RESPONSES TO PHQ9 QUESTIONS 1 & 2: 5
2. FEELING DOWN, DEPRESSED OR HOPELESS: MORE THAN HALF THE DAYS
9. THOUGHTS THAT YOU WOULD BE BETTER OFF DEAD, OR OF HURTING YOURSELF: NOT AT ALL
10. IF YOU CHECKED OFF ANY PROBLEMS, HOW DIFFICULT HAVE THESE PROBLEMS MADE IT FOR YOU TO DO YOUR WORK, TAKE CARE OF THINGS AT HOME, OR GET ALONG WITH OTHER PEOPLE: EXTREMELY DIFFICULT
8. MOVING OR SPEAKING SO SLOWLY THAT OTHER PEOPLE COULD HAVE NOTICED. OR THE OPPOSITE, BEING SO FIGETY OR RESTLESS THAT YOU HAVE BEEN MOVING AROUND A LOT MORE THAN USUAL: SEVERAL DAYS
1. LITTLE INTEREST OR PLEASURE IN DOING THINGS: NEARLY EVERY DAY
6. FEELING BAD ABOUT YOURSELF - OR THAT YOU ARE A FAILURE OR HAVE LET YOURSELF OR YOUR FAMILY DOWN: MORE THAN HALF THE DAYS
3. TROUBLE FALLING OR STAYING ASLEEP: NEARLY EVERY DAY
7. TROUBLE CONCENTRATING ON THINGS, SUCH AS READING THE NEWSPAPER OR WATCHING TELEVISION: NEARLY EVERY DAY
4. FEELING TIRED OR HAVING LITTLE ENERGY: NEARLY EVERY DAY
5. POOR APPETITE OR OVEREATING: SEVERAL DAYS

## 2023-11-08 ASSESSMENT — ANXIETY QUESTIONNAIRES
GAD7 TOTAL SCORE: 20
5. BEING SO RESTLESS THAT IT IS HARD TO SIT STILL: MORE THAN HALF THE DAYS
IF YOU CHECKED OFF ANY PROBLEMS ON THIS QUESTIONNAIRE, HOW DIFFICULT HAVE THESE PROBLEMS MADE IT FOR YOU TO DO YOUR WORK, TAKE CARE OF THINGS AT HOME, OR GET ALONG WITH OTHER PEOPLE: EXTREMELY DIFFICULT
2. NOT BEING ABLE TO STOP OR CONTROL WORRYING: NEARLY EVERY DAY
7. FEELING AFRAID AS IF SOMETHING AWFUL MIGHT HAPPEN: NEARLY EVERY DAY
3. WORRYING TOO MUCH ABOUT DIFFERENT THINGS: NEARLY EVERY DAY
4. TROUBLE RELAXING: NEARLY EVERY DAY
6. BECOMING EASILY ANNOYED OR IRRITABLE: NEARLY EVERY DAY
1. FEELING NERVOUS, ANXIOUS, OR ON EDGE: NEARLY EVERY DAY
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
## 2023-11-15 ENCOUNTER — DOCUMENTATION (OUTPATIENT)
Dept: BEHAVIORAL HEALTH | Facility: CLINIC | Age: 28
End: 2023-11-15
Payer: COMMERCIAL

## 2023-11-15 NOTE — PROGRESS NOTES
[facility] Psychiatry Consult Note     Patrick Mckeon is a 28 y.o., referred to Collaborative Care for symptoms of depression and anxiety. I have reviewed the patient with the behavioral health manager and reviewed the patient's electronic record. Anxiety started 5 years ago, depression more recently. Poor sleep - had thought about engaging before, declined at that time, but is more open to that now.    Past Medications:  Prescribed fluoxetine and experienced side effects - increased anxiety    Current Meds:  Atarax 25mg as needed for anxiety - started last month    Recommendations:   Agree with counseling - consider brief vs ongoing  Patient can choose one of the following:  Wellbutrin  mg x 1 week, then increase to 300mg daily for depression  OR  Sertraline 25mg daily, increase to 50mg daily at one week if he tolerates, can increase by 50mg every 4-8 weeks after that until symptoms resolve to max dose of 200mg daily.      Patient Health Questionnaire-9 Score: 18 (11/8/2023 11:56 AM)  BO-7 Total Score: 20 (11/8/2023 11:55 AM)      The above treatment considerations and suggestions are based on consultations with the patient's care manager and a review of information available in the electronic medical record. I have not personally examined the patient. All recommendations should be implemented with consideration of the patient's relevant prior history and current clinical status. Please feel free to call me with any questions about the care of this patient.

## 2023-11-17 ENCOUNTER — PRE-ADMISSION TESTING (OUTPATIENT)
Dept: PREADMISSION TESTING | Facility: HOSPITAL | Age: 28
End: 2023-11-17
Payer: COMMERCIAL

## 2023-11-17 VITALS
TEMPERATURE: 98 F | BODY MASS INDEX: 20.99 KG/M2 | WEIGHT: 146.61 LBS | SYSTOLIC BLOOD PRESSURE: 164 MMHG | OXYGEN SATURATION: 99 % | DIASTOLIC BLOOD PRESSURE: 92 MMHG | HEIGHT: 70 IN | HEART RATE: 93 BPM

## 2023-11-17 DIAGNOSIS — I77.3 FIBROMUSCULAR DYSPLASIA OF BILATERAL RENAL ARTERIES (CMS-HCC): ICD-10-CM

## 2023-11-17 DIAGNOSIS — M31.4: ICD-10-CM

## 2023-11-17 DIAGNOSIS — I72.2 ANEURYSM OF LEFT RENAL ARTERY (CMS-HCC): ICD-10-CM

## 2023-11-17 DIAGNOSIS — I15.0 RENOVASCULAR HYPERTENSION: ICD-10-CM

## 2023-11-17 LAB
ABO GROUP (TYPE) IN BLOOD: NORMAL
ANION GAP SERPL CALC-SCNC: 14 MMOL/L (ref 10–20)
ANTIBODY SCREEN: NORMAL
BUN SERPL-MCNC: 6 MG/DL (ref 6–23)
CALCIUM SERPL-MCNC: 9 MG/DL (ref 8.6–10.6)
CHLORIDE SERPL-SCNC: 103 MMOL/L (ref 98–107)
CO2 SERPL-SCNC: 25 MMOL/L (ref 21–32)
CREAT SERPL-MCNC: 0.84 MG/DL (ref 0.5–1.3)
ERYTHROCYTE [DISTWIDTH] IN BLOOD BY AUTOMATED COUNT: 12.1 % (ref 11.5–14.5)
GFR SERPL CREATININE-BSD FRML MDRD: >90 ML/MIN/1.73M*2
GLUCOSE SERPL-MCNC: 108 MG/DL (ref 74–99)
HCT VFR BLD AUTO: 43.5 % (ref 41–52)
HGB BLD-MCNC: 13.9 G/DL (ref 13.5–17.5)
MCH RBC QN AUTO: 28 PG (ref 26–34)
MCHC RBC AUTO-ENTMCNC: 32 G/DL (ref 32–36)
MCV RBC AUTO: 88 FL (ref 80–100)
NRBC BLD-RTO: 0 /100 WBCS (ref 0–0)
PLATELET # BLD AUTO: 258 X10*3/UL (ref 150–450)
POTASSIUM SERPL-SCNC: 4.3 MMOL/L (ref 3.5–5.3)
RBC # BLD AUTO: 4.96 X10*6/UL (ref 4.5–5.9)
RH FACTOR (ANTIGEN D): NORMAL
SODIUM SERPL-SCNC: 138 MMOL/L (ref 136–145)
WBC # BLD AUTO: 3.1 X10*3/UL (ref 4.4–11.3)

## 2023-11-17 PROCEDURE — 86900 BLOOD TYPING SEROLOGIC ABO: CPT

## 2023-11-17 PROCEDURE — 82374 ASSAY BLOOD CARBON DIOXIDE: CPT

## 2023-11-17 PROCEDURE — 86920 COMPATIBILITY TEST SPIN: CPT

## 2023-11-17 PROCEDURE — 85027 COMPLETE CBC AUTOMATED: CPT

## 2023-11-17 PROCEDURE — 36415 COLL VENOUS BLD VENIPUNCTURE: CPT

## 2023-11-17 PROCEDURE — 80048 BASIC METABOLIC PNL TOTAL CA: CPT

## 2023-11-17 PROCEDURE — 99204 OFFICE O/P NEW MOD 45 MIN: CPT | Performed by: NURSE PRACTITIONER

## 2023-11-17 ASSESSMENT — CHADS2 SCORE
AGE GREATER THAN OR EQUAL TO 75: NO
CHF: NO
DIABETES: NO
PRIOR STROKE OR TIA OR THROMBOEMBOLISM: NO
CHADS2 SCORE: 1
HYPERTENSION: YES

## 2023-11-17 ASSESSMENT — ENCOUNTER SYMPTOMS
CARDIOVASCULAR NEGATIVE: 1
MUSCULOSKELETAL NEGATIVE: 1
RESPIRATORY NEGATIVE: 1
GASTROINTESTINAL NEGATIVE: 1
CONSTITUTIONAL NEGATIVE: 1
ENDOCRINE NEGATIVE: 1
NECK NEGATIVE: 1
NEUROLOGICAL NEGATIVE: 1
EYES NEGATIVE: 1

## 2023-11-17 ASSESSMENT — DUKE ACTIVITY SCORE INDEX (DASI)
DASI METS SCORE: 9.9
CAN YOU DO HEAVY WORK AROUND THE HOUSE LIKE SCRUBBING FLOORS OR LIFTING AND MOVING HEAVY FURNITURE: YES
CAN YOU HAVE SEXUAL RELATIONS: YES
CAN YOU PARTICIPATE IN STRENOUS SPORTS LIKE SWIMMING, SINGLES TENNIS, FOOTBALL, BASKETBALL, OR SKIING: YES
CAN YOU RUN A SHORT DISTANCE: YES
TOTAL_SCORE: 58.2
CAN YOU DO YARD WORK LIKE RAKING LEAVES, WEEDING OR PUSHING A MOWER: YES
CAN YOU DO MODERATE WORK AROUND THE HOUSE LIKE VACUUMING, SWEEPING FLOORS OR CARRYING GROCERIES: YES
CAN YOU TAKE CARE OF YOURSELF (EAT, DRESS, BATHE, OR USE TOILET): YES
CAN YOU CLIMB A FLIGHT OF STAIRS OR WALK UP A HILL: YES
CAN YOU PARTICIPATE IN MODERATE RECREATIONAL ACTIVITIES LIKE GOLF, BOWLING, DANCING, DOUBLES TENNIS OR THROWING A BASEBALL OR FOOTBALL: YES
CAN YOU WALK A BLOCK OR TWO ON LEVEL GROUND: YES
CAN YOU WALK INDOORS, SUCH AS AROUND YOUR HOUSE: YES
CAN YOU DO LIGHT WORK AROUND THE HOUSE LIKE DUSTING OR WASHING DISHES: YES

## 2023-11-17 ASSESSMENT — LIFESTYLE VARIABLES: SMOKING_STATUS: NONSMOKER

## 2023-11-17 NOTE — PREPROCEDURE INSTRUCTIONS
NPO Instructions:    Do not eat any food after midnight the night before your surgery/procedure.  You may have up to TEN ounces of clear liquids until TWO hours before your instructed arrival time to the hospital. This includes water, black tea/coffee, (no milk or cream), apple juice, and/or electrolyte drinks (Gatorade).  Yo may chew gum up to TWO hours before your surgery/procedure.    Additional Instructions:     Avoid herbal supplements, multivitamins and NSAIDS (non-steroidal anti-inflammatory drugs) such as Advil, Aleve, Ibuprofen, Naproxen, Excedrin, Meloxicam or Celebrex for at least 7 days prior to surgery. May take Tylenol as needed.    Seven/Six Days before Surgery:  Review your medication instructions, stop indicated medications    Day of Surgery:  Review your medication instructions, take indicated medications  Wear comfortable loose fitting clothing  Do not use moisturizers, creams, lotions or perfume  All jewelry and valuables should be left at home    Constance Conrad [facility]  [facility]  [phone]  [phone]  Email-[email or web address]

## 2023-11-17 NOTE — CPM/PAT H&P
CPM/PAT Evaluation       Name: Patrick Mckeon (Patrick Mckeon)  /Age: 1995/ y.o.     Visit Type:   In-Person       Chief Complaint: preoperative evaluation, renal artery strenosis    HPI  The patient is a 28 year old male with recently diagnosed renovascular hypertension/SULAIMAN. He presents today for perioperative evaluation in anticipation of right transplant kidney, left repair artery abdominal cavity on 23 with Dr. Mckeon, Dr. Kelly, Dr. Wilhelm and Dr. To.    Past Medical History:   Diagnosis Date    Bipolar disorder, currently in remission, most recent episode unspecified (CMS/HCC) 2022    History of depressed bipolar disorder    Hyperlipidemia     Hypertension     Renal artery stenosis (CMS/[facility])     Nep: Fabian Stokes, LOV 2023       Past Surgical History:   Procedure Laterality Date    CT ABDOMEN PELVIS ANGIOGRAM W AND/OR WO IV CONTRAST  2023    CT ABDOMEN PELVIS ANGIOGRAM W AND/OR WO IV CONTRAST 7/3/2023 [facility]    CT ANGIO NECK  2023    CT NECK ANGIO W AND WO IV CONTRAST 2023 [facility]    CT HEAD ANGIO W AND WO IV CONTRAST  2023    CT HEAD ANGIO W AND WO IV CONTRAST 2023 [facility]    INVASIVE VASCULAR PROCEDURE Bilateral 10/12/2023    Procedure: Upper Extremity Angiogram;  Surgeon: Jovany Kelly MD;  Location: [facility] Cardiac Cath Lab;  Service: Vascular Surgery;  Laterality: Bilateral;    IR ANGIOGRAM RENAL BILATERAL Bilateral 08/15/2023    IR ANGIOGRAM RENAL BILATERAL 8/15/2023 [facility]    OTHER SURGICAL HISTORY  2022    Lipoma excision       Patient Sexual activity questions deferred to the physician.    Family History   Problem Relation Name Age of Onset    Coronary artery disease Father      Hyperlipidemia Father      Coronary artery disease Father's Sister      Other (CABG) Father's Sister      Coronary artery disease Paternal Grandmother      Heart attack Paternal Grandmother      Coronary artery disease Paternal Grandfather      Heart  attack Paternal Grandfather         Allergies   Allergen Reactions    Amlodipine Other     swelling in lower extremities       Prior to Admission medications    Medication Sig Start Date End Date Taking? Authorizing Provider   aspirin 81 mg EC tablet Take 1 tablet (81 mg) by mouth once daily.    Historical Provider, MD   cetirizine (ZyrTEC) 10 mg capsule Take 1 capsule (10 mg) by mouth once daily.    Historical Provider, MD   ergocalciferol, vitamin D2, (VITAMIN D2 ORAL) Take 1 tablet by mouth once daily.    Historical Provider, MD   herbal drugs (Calmme) tablet Take by mouth.    Historical Provider, MD   hydrOXYzine HCL (Atarax) 25 mg tablet Take 1 tablet (25 mg) by mouth once daily for 10 days. 10/19/23 10/29/23  Lalitha Eddy DO   lisinopril 20 mg tablet Take 1 tablet (20 mg) by mouth once daily.    Historical Provider, MD   rosuvastatin (Crestor) 10 mg tablet TAKE 1 TABLET (10 MG) BY MOUTH ONCE DAILY AT BEDTIME. 9/12/23   Ekaterina Flowers DO   triamcinolone (Nasacort) 55 mcg nasal inhaler Administer into affected nostril(s).    Historical Provider, MD   omeprazole (PriLOSEC) 20 mg DR capsule Take 1 capsule (20 mg) by mouth once daily. Do not crush or chew. 10/19/23 11/17/23  Lalitha Eddy DO        PAT ROS:   Constitutional:   neg    Neuro/Psych:   neg    Eyes:   neg    Ears:   neg    Nose:   neg    Mouth:   neg    Throat:   neg    Neck:   neg    Cardio:   neg    Respiratory:   neg    Endocrine:   neg    GI:   neg    :   neg    Musculoskeletal:   neg    Hematologic:   neg    Skin:  neg        Physical Exam  Vitals reviewed.   Constitutional:       Appearance: Normal appearance.   HENT:      Head: Normocephalic and atraumatic.      Nose: Nose normal.      Mouth/Throat:      Mouth: Mucous membranes are moist.      Pharynx: Oropharynx is clear.   Eyes:      Extraocular Movements: Extraocular movements intact.      Pupils: Pupils are equal, round, and reactive to light.   Cardiovascular:      Rate and  Rhythm: Normal rate and regular rhythm.      Pulses: Normal pulses.      Heart sounds: Normal heart sounds.   Pulmonary:      Effort: Pulmonary effort is normal.      Breath sounds: Normal breath sounds.   Musculoskeletal:         General: Normal range of motion.      Cervical back: Normal range of motion.   Skin:     General: Skin is warm and dry.   Neurological:      General: No focal deficit present.      Mental Status: He is alert and oriented to person, place, and time.   Psychiatric:         Mood and Affect: Mood normal.         Behavior: Behavior normal.          PAT AIRWAY:   Airway:     Mallampati::  II    TM distance::  >3 FB    Neck ROM::  Full  normal        Visit Vitals  BP (!) 164/92   Pulse 93   Temp 36.7 °C (98 °F) (Oral)       DASI Risk Score      Flowsheet Row Most Recent Value   DASI SCORE 58.2   METS Score (Will be calculated only when all the questions are answered) 9.9          Caprini DVT Assessment      Flowsheet Row Most Recent Value   DVT Score 6   Current Status Major surgery planned, lasting over 3 hours   Age Less than 40 years   BMI 30 or less          Modified Frailty Index      Flowsheet Row Most Recent Value   Modified Frailty Index Calculator .0909          CHADS2 Stroke Risk  Current as of 3 hours ago        N/A 3 - 100%: High Risk   2 - 3%: Medium Risk   0 - 2%: Low Risk     Last Change: N/A          This score determines the patient's risk of having a stroke if the patient has atrial fibrillation.        This score is not applicable to this patient. Components are not calculated.          Revised Cardiac Risk Index      Flowsheet Row Most Recent Value   Revised Cardiac Risk Calculator 1          Apfel Simplified Score      Flowsheet Row Most Recent Value   Apfel Simplified Score Calculator 2          Risk Analysis Index Results This Encounter    No data found in the last 1 encounters.       Stop Bang Score      Flowsheet Row Most Recent Value   Do you snore loudly? 0   Do you  often feel tired or fatigued after your sleep? 1   Has anyone ever observed you stop breathing in your sleep? 0   Do you have or are you being treated for high blood pressure? 1   Recent BMI (Calculated) 20.4   Is BMI greater than 35 kg/m2? 0=No   Age older than 50 years old? 0=No   Is your neck circumference greater than 17 inches (Male) or 16 inches (Female)? 0   Gender - Male 1=Yes   STOP-BANG Total Score 3          Recent Results (from the past 168 hour(s))   Basic Metabolic Panel    Collection Time: 11/17/23 11:41 AM   Result Value Ref Range    Glucose 108 (H) 74 - 99 mg/dL    Sodium 138 136 - 145 mmol/L    Potassium 4.3 3.5 - 5.3 mmol/L    Chloride 103 98 - 107 mmol/L    Bicarbonate 25 21 - 32 mmol/L    Anion Gap 14 10 - 20 mmol/L    Urea Nitrogen 6 6 - 23 mg/dL    Creatinine 0.84 0.50 - 1.30 mg/dL    eGFR >90 >60 mL/min/1.73m*2    Calcium 9.0 8.6 - 10.6 mg/dL   CBC    Collection Time: 11/17/23 11:41 AM   Result Value Ref Range    WBC 3.1 (L) 4.4 - 11.3 x10*3/uL    nRBC 0.0 0.0 - 0.0 /100 WBCs    RBC 4.96 4.50 - 5.90 x10*6/uL    Hemoglobin 13.9 13.5 - 17.5 g/dL    Hematocrit 43.5 41.0 - 52.0 %    MCV 88 80 - 100 fL    MCH 28.0 26.0 - 34.0 pg    MCHC 32.0 32.0 - 36.0 g/dL    RDW 12.1 11.5 - 14.5 %    Platelets 258 150 - 450 x10*3/uL   Type And Screen    Collection Time: 11/17/23 11:41 AM   Result Value Ref Range    ABO TYPE AB     Rh TYPE POS     ANTIBODY SCREEN NEG         Diagnostic Results      Assessment and Plan:     Neuro:   The patient has no neurological diagnoses or significant findings on chart review, clinical presentation, and evaluation.  No grossly apparent perioperative risk. The patient is at increased risk for perioperative stroke secondary to general anesthesia, operative time >2.5 hours. Handouts for preoperative brain exercises given to patient    HEENT/Airway  No diagnoses, significant findings on chart review, clinical presentation, or evaluation.    Cardiovascular  The patient is  scheduled for non-cardiac surgery associated with elevated risk.  The patient has no major cardiac contraindications to non- cardiac surgery.  RCRI  The patient meets 0-1 RCRI criteria and therefore has a less than 1% risk of major adverse cardiac complications.  METS  The patient's functional capacity capacity is greater than 4 METS.  EKG  The patient has no EKG or echocardiographic changes concerning for myocardial ischemia.  No further cardiac evaluation is indicated  Heart Failure  The patient has no known history of heart failure.  Additionally, the patient reports no symptoms of heart failure and demonstrates no signs of heart failure.  Hypertension Evaluation  The patient has a known history of hypertension that is controlled.  Patient's hypertension is most consistent with stage 2.  Heart Rhythm Evaluation  The patient has no history of arrhythmias.  Heart Valve Evaluation  The patient has no known history of valvular heart disease. The patient has no symptoms or physical exam findings to suggest valvular heart disease.  CARDS EVAL  The patient was seen by Dr. Vivi Brown 10/2/23. Head/neck scans and YANELIS all ok. Continued follow up with vascular team.  He was seen in ER 10/19 for complaints of chest tightness. Work up negative for ACS, EKG showed LVH otherwise unremarkable, chest x-ray showed a narrow mediastinum no evidence of pulmonary infiltrates or edema.  Patient's labs including a troponin negative.   Suspected GERD/anxiety. Scripts given for PPI and hydroxyzine. No currently complaints.  The patient has a 30-day risk for MACE of 1 predictor, 6.0% risk for cardiac death, nonfatal myocardial infarction, and nonfatal cardiac arrest.  TAYA score which indicates a 0% risk of intraoperative or 30-day postoperative.    Pulmonary   No significant findings on chart review or clinical presentation and evaluation.  The patient has a stop bang score of 3, which places patient at intermediate risk for having  POLLY.  ARISCAT 38, Intermediate, 13.3% risk of in-hospital postoperative pulmonary complications  PRODIGY 3, low of respiratory depression episode.    Hematology  No diagnoses or significant findings on chart review or clinical presentation and evaluation.  Antiplatelet management   The patient is currently receiving antiplatelet therapy for primary prevention of cardiovascular disease., The patient was instructed to continue in the perioperative period.  Anticoagulation management  The patient is not currently receiving anticoagulation therapy.,  Patient provided with DVT educational handout.    Caprini score 6, high risk of VTE  Transfusion Evaluation  A type and screen was obtained given the likelihood for perioperative transfusion of blood or blood products.    GI  No diagnoses or significant findings on chart review or clinical presentation and evaluation.  Eat 10- 0,  self-perceived oropharyngeal dysphagia scale (0-40)     Genitourinary  No diagnoses or significant findings on chart review or clinical presentation and evaluation.    Renal  The patient has no known history of chronic kidney disease. The patient is scheduled for peritoneal surgery which places patient at higher risk of perioperative renal complications. The patient has specific risk factors associated with increased risk of perioperative renal complications due to male gender, hypertension. Preventative measures include preoperative hydration. Recently diagnosed renovascular hypertension. Scheduled for surgery 11/28.     Musculoskeletal  No diagnoses or significant findings on chart review or clinical presentation and evaluation.    Endocrine  Diabetes Evaluation  The patient has no history of diabetes mellitus  Thyroid Disease Evaluation  The patient has no history of thyroid disease.    ID  No diagnoses or significant findings on chart review or clinical presentation and evaluation.    -Preoperative medication instructions were provided and  reviewed with the patient.  Any additional testing or evaluation was explained to the patient.  NPO Instructions were discussed, and the patient's questions were answered prior to conclusion of this encounter

## 2023-11-20 ENCOUNTER — SOCIAL WORK (OUTPATIENT)
Dept: PRIMARY CARE | Facility: CLINIC | Age: 28
End: 2023-11-20
Payer: COMMERCIAL

## 2023-11-20 ASSESSMENT — ANXIETY QUESTIONNAIRES
IF YOU CHECKED OFF ANY PROBLEMS ON THIS QUESTIONNAIRE, HOW DIFFICULT HAVE THESE PROBLEMS MADE IT FOR YOU TO DO YOUR WORK, TAKE CARE OF THINGS AT HOME, OR GET ALONG WITH OTHER PEOPLE: EXTREMELY DIFFICULT
GAD7 TOTAL SCORE: 18
5. BEING SO RESTLESS THAT IT IS HARD TO SIT STILL: MORE THAN HALF THE DAYS
1. FEELING NERVOUS, ANXIOUS, OR ON EDGE: NEARLY EVERY DAY
6. BECOMING EASILY ANNOYED OR IRRITABLE: MORE THAN HALF THE DAYS
3. WORRYING TOO MUCH ABOUT DIFFERENT THINGS: NEARLY EVERY DAY
7. FEELING AFRAID AS IF SOMETHING AWFUL MIGHT HAPPEN: MORE THAN HALF THE DAYS
2. NOT BEING ABLE TO STOP OR CONTROL WORRYING: NEARLY EVERY DAY
4. TROUBLE RELAXING: NEARLY EVERY DAY

## 2023-11-20 ASSESSMENT — PATIENT HEALTH QUESTIONNAIRE - PHQ9
1. LITTLE INTEREST OR PLEASURE IN DOING THINGS: NEARLY EVERY DAY
3. TROUBLE FALLING OR STAYING ASLEEP: NEARLY EVERY DAY
SUM OF ALL RESPONSES TO PHQ QUESTIONS 1-9: 17
8. MOVING OR SPEAKING SO SLOWLY THAT OTHER PEOPLE COULD HAVE NOTICED. OR THE OPPOSITE, BEING SO FIGETY OR RESTLESS THAT YOU HAVE BEEN MOVING AROUND A LOT MORE THAN USUAL: SEVERAL DAYS
7. TROUBLE CONCENTRATING ON THINGS, SUCH AS READING THE NEWSPAPER OR WATCHING TELEVISION: NEARLY EVERY DAY
10. IF YOU CHECKED OFF ANY PROBLEMS, HOW DIFFICULT HAVE THESE PROBLEMS MADE IT FOR YOU TO DO YOUR WORK, TAKE CARE OF THINGS AT HOME, OR GET ALONG WITH OTHER PEOPLE: EXTREMELY DIFFICULT
4. FEELING TIRED OR HAVING LITTLE ENERGY: NEARLY EVERY DAY
6. FEELING BAD ABOUT YOURSELF - OR THAT YOU ARE A FAILURE OR HAVE LET YOURSELF OR YOUR FAMILY DOWN: SEVERAL DAYS
9. THOUGHTS THAT YOU WOULD BE BETTER OFF DEAD, OR OF HURTING YOURSELF: NOT AT ALL
SUM OF ALL RESPONSES TO PHQ9 QUESTIONS 1 & 2: 5
2. FEELING DOWN, DEPRESSED OR HOPELESS: MORE THAN HALF THE DAYS
5. POOR APPETITE OR OVEREATING: SEVERAL DAYS
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
## 2023-11-27 ENCOUNTER — ANESTHESIA EVENT (OUTPATIENT)
Dept: OPERATING ROOM | Facility: HOSPITAL | Age: 28
DRG: 660 | End: 2023-11-27
Payer: COMMERCIAL

## 2023-11-27 LAB
GENETICS TEST NAME-DATA CONVERSION: NORMAL
LAB MOLECULAR CA TECHNICAL NOTES: NORMAL

## 2023-11-28 ENCOUNTER — ANESTHESIA (OUTPATIENT)
Dept: OPERATING ROOM | Facility: HOSPITAL | Age: 28
DRG: 660 | End: 2023-11-28
Payer: COMMERCIAL

## 2023-11-28 ENCOUNTER — APPOINTMENT (OUTPATIENT)
Dept: RADIOLOGY | Facility: HOSPITAL | Age: 28
DRG: 660 | End: 2023-11-28
Payer: COMMERCIAL

## 2023-11-28 ENCOUNTER — HOSPITAL ENCOUNTER (INPATIENT)
Facility: HOSPITAL | Age: 28
LOS: 7 days | Discharge: HOME | DRG: 660 | End: 2023-12-05
Attending: SURGERY | Admitting: PHYSICIAN ASSISTANT
Payer: COMMERCIAL

## 2023-11-28 DIAGNOSIS — K59.03 CONSTIPATION DUE TO OPIOID THERAPY: ICD-10-CM

## 2023-11-28 DIAGNOSIS — M31.4: ICD-10-CM

## 2023-11-28 DIAGNOSIS — M62.838 MUSCLE SPASM: ICD-10-CM

## 2023-11-28 DIAGNOSIS — R11.0 NAUSEA: ICD-10-CM

## 2023-11-28 DIAGNOSIS — Q87.81 ALPORT SYNDROME (HHS-HCC): ICD-10-CM

## 2023-11-28 DIAGNOSIS — I77.3 FIBROMUSCULAR DYSPLASIA OF BILATERAL RENAL ARTERIES (CMS-HCC): ICD-10-CM

## 2023-11-28 DIAGNOSIS — I70.1 RENAL ARTERY STENOSIS (CMS-HCC): Primary | ICD-10-CM

## 2023-11-28 DIAGNOSIS — I15.0 RENOVASCULAR HYPERTENSION: ICD-10-CM

## 2023-11-28 DIAGNOSIS — G89.18 ACUTE POST-OPERATIVE PAIN: ICD-10-CM

## 2023-11-28 DIAGNOSIS — T40.2X5A CONSTIPATION DUE TO OPIOID THERAPY: ICD-10-CM

## 2023-11-28 DIAGNOSIS — I10 ESSENTIAL HYPERTENSION: ICD-10-CM

## 2023-11-28 DIAGNOSIS — I72.2 RENAL ARTERIAL ANEURYSM (CMS-HCC): ICD-10-CM

## 2023-11-28 DIAGNOSIS — I72.2 ANEURYSM OF LEFT RENAL ARTERY (CMS-HCC): ICD-10-CM

## 2023-11-28 LAB
ABO GROUP (TYPE) IN BLOOD: NORMAL
ALBUMIN SERPL BCP-MCNC: 4.2 G/DL (ref 3.4–5)
ANION GAP BLDA CALCULATED.4IONS-SCNC: 11 MMO/L (ref 10–25)
ANION GAP BLDA CALCULATED.4IONS-SCNC: 11 MMO/L (ref 10–25)
ANION GAP BLDA CALCULATED.4IONS-SCNC: 12 MMO/L (ref 10–25)
ANION GAP SERPL CALC-SCNC: 14 MMOL/L (ref 10–20)
ANTIBODY SCREEN: NORMAL
APTT PPP: 25 SECONDS (ref 27–38)
BASE EXCESS BLDA CALC-SCNC: -2.7 MMOL/L (ref -2–3)
BASE EXCESS BLDA CALC-SCNC: -6.9 MMOL/L (ref -2–3)
BASE EXCESS BLDA CALC-SCNC: 0.6 MMOL/L (ref -2–3)
BODY TEMPERATURE: 37 DEGREES CELSIUS
BUN SERPL-MCNC: 9 MG/DL (ref 6–23)
CA-I BLDA-SCNC: 1.05 MMOL/L (ref 1.1–1.33)
CA-I BLDA-SCNC: 1.15 MMOL/L (ref 1.1–1.33)
CA-I BLDA-SCNC: 1.18 MMOL/L (ref 1.1–1.33)
CALCIUM SERPL-MCNC: 8.9 MG/DL (ref 8.6–10.6)
CHLORIDE BLDA-SCNC: 104 MMOL/L (ref 98–107)
CHLORIDE BLDA-SCNC: 105 MMOL/L (ref 98–107)
CHLORIDE BLDA-SCNC: 106 MMOL/L (ref 98–107)
CHLORIDE SERPL-SCNC: 105 MMOL/L (ref 98–107)
CO2 SERPL-SCNC: 25 MMOL/L (ref 21–32)
CREAT SERPL-MCNC: 0.74 MG/DL (ref 0.5–1.3)
ERYTHROCYTE [DISTWIDTH] IN BLOOD BY AUTOMATED COUNT: 12.6 % (ref 11.5–14.5)
GFR SERPL CREATININE-BSD FRML MDRD: >90 ML/MIN/1.73M*2
GLUCOSE BLD MANUAL STRIP-MCNC: 143 MG/DL (ref 74–99)
GLUCOSE BLD MANUAL STRIP-MCNC: 144 MG/DL (ref 74–99)
GLUCOSE BLDA-MCNC: 147 MG/DL (ref 74–99)
GLUCOSE BLDA-MCNC: 153 MG/DL (ref 74–99)
GLUCOSE BLDA-MCNC: 99 MG/DL (ref 74–99)
GLUCOSE SERPL-MCNC: 149 MG/DL (ref 74–99)
HCO3 BLDA-SCNC: 19.2 MMOL/L (ref 22–26)
HCO3 BLDA-SCNC: 22.6 MMOL/L (ref 22–26)
HCO3 BLDA-SCNC: 26.6 MMOL/L (ref 22–26)
HCT VFR BLD AUTO: 38.1 % (ref 41–52)
HCT VFR BLD EST: 37 % (ref 41–52)
HCT VFR BLD EST: 38 % (ref 41–52)
HCT VFR BLD EST: 40 % (ref 41–52)
HGB BLD-MCNC: 12.6 G/DL (ref 13.5–17.5)
HGB BLDA-MCNC: 12.2 G/DL (ref 13.5–17.5)
HGB BLDA-MCNC: 12.8 G/DL (ref 13.5–17.5)
HGB BLDA-MCNC: 13.3 G/DL (ref 13.5–17.5)
INHALED O2 CONCENTRATION: 21 %
INHALED O2 CONCENTRATION: 50 %
INHALED O2 CONCENTRATION: 50 %
INR PPP: 1.1 (ref 0.9–1.1)
LACTATE BLDA-SCNC: 1.8 MMOL/L (ref 0.4–2)
LACTATE BLDA-SCNC: 2.3 MMOL/L (ref 0.4–2)
LACTATE BLDA-SCNC: 3.2 MMOL/L (ref 0.4–2)
MAGNESIUM SERPL-MCNC: 1.4 MG/DL (ref 1.6–2.4)
MCH RBC QN AUTO: 28.8 PG (ref 26–34)
MCHC RBC AUTO-ENTMCNC: 33.1 G/DL (ref 32–36)
MCV RBC AUTO: 87 FL (ref 80–100)
NRBC BLD-RTO: 0 /100 WBCS (ref 0–0)
OXYHGB MFR BLDA: 95.4 % (ref 94–98)
OXYHGB MFR BLDA: 98.1 % (ref 94–98)
OXYHGB MFR BLDA: 98.7 % (ref 94–98)
PCO2 BLDA: 40 MM HG (ref 38–42)
PCO2 BLDA: 40 MM HG (ref 38–42)
PCO2 BLDA: 47 MM HG (ref 38–42)
PH BLDA: 7.29 PH (ref 7.38–7.42)
PH BLDA: 7.36 PH (ref 7.38–7.42)
PH BLDA: 7.36 PH (ref 7.38–7.42)
PHOSPHATE SERPL-MCNC: 4.4 MG/DL (ref 2.5–4.9)
PLATELET # BLD AUTO: 237 X10*3/UL (ref 150–450)
PO2 BLDA: 216 MM HG (ref 85–95)
PO2 BLDA: 239 MM HG (ref 85–95)
PO2 BLDA: 88 MM HG (ref 85–95)
POTASSIUM BLDA-SCNC: 3.8 MMOL/L (ref 3.5–5.3)
POTASSIUM BLDA-SCNC: 4.2 MMOL/L (ref 3.5–5.3)
POTASSIUM BLDA-SCNC: 4.3 MMOL/L (ref 3.5–5.3)
POTASSIUM SERPL-SCNC: 4.1 MMOL/L (ref 3.5–5.3)
PROTHROMBIN TIME: 12.9 SECONDS (ref 9.8–12.8)
RBC # BLD AUTO: 4.37 X10*6/UL (ref 4.5–5.9)
RH FACTOR (ANTIGEN D): NORMAL
SAO2 % BLDA: 100 % (ref 94–100)
SAO2 % BLDA: 97 % (ref 94–100)
SAO2 % BLDA: 99 % (ref 94–100)
SODIUM BLDA-SCNC: 133 MMOL/L (ref 136–145)
SODIUM BLDA-SCNC: 135 MMOL/L (ref 136–145)
SODIUM BLDA-SCNC: 137 MMOL/L (ref 136–145)
SODIUM SERPL-SCNC: 140 MMOL/L (ref 136–145)
WBC # BLD AUTO: 12.7 X10*3/UL (ref 4.4–11.3)

## 2023-11-28 PROCEDURE — 2500000004 HC RX 250 GENERAL PHARMACY W/ HCPCS (ALT 636 FOR OP/ED)

## 2023-11-28 PROCEDURE — 35631 BPG AOR-CELIAC-MSN-RENAL: CPT | Performed by: SURGERY

## 2023-11-28 PROCEDURE — 85347 COAGULATION TIME ACTIVATED: CPT

## 2023-11-28 PROCEDURE — 36620 INSERTION CATHETER ARTERY: CPT | Performed by: STUDENT IN AN ORGANIZED HEALTH CARE EDUCATION/TRAINING PROGRAM

## 2023-11-28 PROCEDURE — 83605 ASSAY OF LACTIC ACID: CPT | Performed by: STUDENT IN AN ORGANIZED HEALTH CARE EDUCATION/TRAINING PROGRAM

## 2023-11-28 PROCEDURE — 2500000001 HC RX 250 WO HCPCS SELF ADMINISTERED DRUGS (ALT 637 FOR MEDICARE OP): Performed by: PHYSICIAN ASSISTANT

## 2023-11-28 PROCEDURE — 84132 ASSAY OF SERUM POTASSIUM: CPT | Performed by: STUDENT IN AN ORGANIZED HEALTH CARE EDUCATION/TRAINING PROGRAM

## 2023-11-28 PROCEDURE — 71045 X-RAY EXAM CHEST 1 VIEW: CPT | Mod: FY

## 2023-11-28 PROCEDURE — 84132 ASSAY OF SERUM POTASSIUM: CPT | Performed by: PHYSICIAN ASSISTANT

## 2023-11-28 PROCEDURE — 2500000004 HC RX 250 GENERAL PHARMACY W/ HCPCS (ALT 636 FOR OP/ED): Performed by: STUDENT IN AN ORGANIZED HEALTH CARE EDUCATION/TRAINING PROGRAM

## 2023-11-28 PROCEDURE — 85018 HEMOGLOBIN: CPT | Performed by: PHYSICIAN ASSISTANT

## 2023-11-28 PROCEDURE — 82947 ASSAY GLUCOSE BLOOD QUANT: CPT

## 2023-11-28 PROCEDURE — 2500000004 HC RX 250 GENERAL PHARMACY W/ HCPCS (ALT 636 FOR OP/ED): Performed by: PHYSICIAN ASSISTANT

## 2023-11-28 PROCEDURE — 76937 US GUIDE VASCULAR ACCESS: CPT | Performed by: STUDENT IN AN ORGANIZED HEALTH CARE EDUCATION/TRAINING PROGRAM

## 2023-11-28 PROCEDURE — 35121 REPAIR DEFECT OF ARTERY: CPT | Performed by: SURGERY

## 2023-11-28 PROCEDURE — 3600000005 HC OR TIME - INITIAL BASE CHARGE - PROCEDURE LEVEL FIVE: Performed by: SURGERY

## 2023-11-28 PROCEDURE — 86900 BLOOD TYPING SEROLOGIC ABO: CPT | Performed by: STUDENT IN AN ORGANIZED HEALTH CARE EDUCATION/TRAINING PROGRAM

## 2023-11-28 PROCEDURE — 83735 ASSAY OF MAGNESIUM: CPT | Performed by: PHYSICIAN ASSISTANT

## 2023-11-28 PROCEDURE — 2780000003 HC OR 278 NO HCPCS: Performed by: SURGERY

## 2023-11-28 PROCEDURE — 82330 ASSAY OF CALCIUM: CPT | Performed by: STUDENT IN AN ORGANIZED HEALTH CARE EDUCATION/TRAINING PROGRAM

## 2023-11-28 PROCEDURE — A4217 STERILE WATER/SALINE, 500 ML: HCPCS | Performed by: SURGERY

## 2023-11-28 PROCEDURE — 3700000002 HC GENERAL ANESTHESIA TIME - EACH INCREMENTAL 1 MINUTE: Performed by: SURGERY

## 2023-11-28 PROCEDURE — 3700000001 HC GENERAL ANESTHESIA TIME - INITIAL BASE CHARGE: Performed by: SURGERY

## 2023-11-28 PROCEDURE — 96372 THER/PROPH/DIAG INJ SC/IM: CPT | Performed by: PHYSICIAN ASSISTANT

## 2023-11-28 PROCEDURE — 85730 THROMBOPLASTIN TIME PARTIAL: CPT | Performed by: PHYSICIAN ASSISTANT

## 2023-11-28 PROCEDURE — 99291 CRITICAL CARE FIRST HOUR: CPT | Performed by: STUDENT IN AN ORGANIZED HEALTH CARE EDUCATION/TRAINING PROGRAM

## 2023-11-28 PROCEDURE — 88304 TISSUE EXAM BY PATHOLOGIST: CPT | Mod: TC | Performed by: SURGERY

## 2023-11-28 PROCEDURE — S0109 METHADONE ORAL 5MG: HCPCS | Performed by: STUDENT IN AN ORGANIZED HEALTH CARE EDUCATION/TRAINING PROGRAM

## 2023-11-28 PROCEDURE — 37799 UNLISTED PX VASCULAR SURGERY: CPT | Performed by: PHYSICIAN ASSISTANT

## 2023-11-28 PROCEDURE — 36556 INSERT NON-TUNNEL CV CATH: CPT | Performed by: STUDENT IN AN ORGANIZED HEALTH CARE EDUCATION/TRAINING PROGRAM

## 2023-11-28 PROCEDURE — 2500000002 HC RX 250 W HCPCS SELF ADMINISTERED DRUGS (ALT 637 FOR MEDICARE OP, ALT 636 FOR OP/ED): Performed by: STUDENT IN AN ORGANIZED HEALTH CARE EDUCATION/TRAINING PROGRAM

## 2023-11-28 PROCEDURE — 88304 TISSUE EXAM BY PATHOLOGIST: CPT | Performed by: PATHOLOGY

## 2023-11-28 PROCEDURE — 2500000004 HC RX 250 GENERAL PHARMACY W/ HCPCS (ALT 636 FOR OP/ED): Performed by: SURGERY

## 2023-11-28 PROCEDURE — 85027 COMPLETE CBC AUTOMATED: CPT | Performed by: PHYSICIAN ASSISTANT

## 2023-11-28 PROCEDURE — 3600000010 HC OR TIME - EACH INCREMENTAL 1 MINUTE - PROCEDURE LEVEL FIVE: Performed by: SURGERY

## 2023-11-28 PROCEDURE — 76942 ECHO GUIDE FOR BIOPSY: CPT | Performed by: STUDENT IN AN ORGANIZED HEALTH CARE EDUCATION/TRAINING PROGRAM

## 2023-11-28 PROCEDURE — 88305 TISSUE EXAM BY PATHOLOGIST: CPT | Performed by: PATHOLOGY

## 2023-11-28 PROCEDURE — 71045 X-RAY EXAM CHEST 1 VIEW: CPT | Performed by: RADIOLOGY

## 2023-11-28 PROCEDURE — P9045 ALBUMIN (HUMAN), 5%, 250 ML: HCPCS | Mod: JZ,JG | Performed by: STUDENT IN AN ORGANIZED HEALTH CARE EDUCATION/TRAINING PROGRAM

## 2023-11-28 PROCEDURE — 82435 ASSAY OF BLOOD CHLORIDE: CPT | Performed by: STUDENT IN AN ORGANIZED HEALTH CARE EDUCATION/TRAINING PROGRAM

## 2023-11-28 PROCEDURE — 2500000005 HC RX 250 GENERAL PHARMACY W/O HCPCS: Performed by: SURGERY

## 2023-11-28 PROCEDURE — 1100000001 HC PRIVATE ROOM DAILY

## 2023-11-28 PROCEDURE — 99291 CRITICAL CARE FIRST HOUR: CPT | Performed by: PHYSICIAN ASSISTANT

## 2023-11-28 PROCEDURE — C1768 GRAFT, VASCULAR: HCPCS | Performed by: SURGERY

## 2023-11-28 PROCEDURE — 04100J3 BYPASS ABDOMINAL AORTA TO RIGHT RENAL ARTERY WITH SYNTHETIC SUBSTITUTE, OPEN APPROACH: ICD-10-PCS | Performed by: SURGERY

## 2023-11-28 PROCEDURE — 99223 1ST HOSP IP/OBS HIGH 75: CPT | Performed by: STUDENT IN AN ORGANIZED HEALTH CARE EDUCATION/TRAINING PROGRAM

## 2023-11-28 PROCEDURE — 88304 TISSUE EXAM BY PATHOLOGIST: CPT | Mod: TC,SUR | Performed by: SURGERY

## 2023-11-28 PROCEDURE — 2500000005 HC RX 250 GENERAL PHARMACY W/O HCPCS: Performed by: STUDENT IN AN ORGANIZED HEALTH CARE EDUCATION/TRAINING PROGRAM

## 2023-11-28 PROCEDURE — 2720000007 HC OR 272 NO HCPCS: Performed by: SURGERY

## 2023-11-28 DEVICE — GELWEAVE GELATIN IMPREGNATED WOVEN VASCULAR PROSTHESIS STRAIGHT
Type: IMPLANTABLE DEVICE | Site: ARTERIAL | Status: FUNCTIONAL
Brand: GELWEAVE™

## 2023-11-28 RX ORDER — SODIUM CHLORIDE, SODIUM LACTATE, POTASSIUM CHLORIDE, AND CALCIUM CHLORIDE .6; .31; .03; .02 G/100ML; G/100ML; G/100ML; G/100ML
IRRIGANT IRRIGATION AS NEEDED
Status: DISCONTINUED | OUTPATIENT
Start: 2023-11-28 | End: 2023-11-28 | Stop reason: HOSPADM

## 2023-11-28 RX ORDER — SODIUM CHLORIDE 0.9 G/100ML
IRRIGANT IRRIGATION AS NEEDED
Status: DISCONTINUED | OUTPATIENT
Start: 2023-11-28 | End: 2023-11-28 | Stop reason: HOSPADM

## 2023-11-28 RX ORDER — LABETALOL HYDROCHLORIDE 5 MG/ML
10 INJECTION, SOLUTION INTRAVENOUS ONCE
Status: DISCONTINUED | OUTPATIENT
Start: 2023-11-28 | End: 2023-11-28

## 2023-11-28 RX ORDER — SODIUM CHLORIDE, SODIUM LACTATE, POTASSIUM CHLORIDE, CALCIUM CHLORIDE 600; 310; 30; 20 MG/100ML; MG/100ML; MG/100ML; MG/100ML
0-1000 INJECTION, SOLUTION INTRAVENOUS CONTINUOUS
Status: DISCONTINUED | OUTPATIENT
Start: 2023-11-28 | End: 2023-11-29

## 2023-11-28 RX ORDER — CALCIUM CHLORIDE INJECTION 100 MG/ML
INJECTION, SOLUTION INTRAVENOUS AS NEEDED
Status: DISCONTINUED | OUTPATIENT
Start: 2023-11-28 | End: 2023-11-28

## 2023-11-28 RX ORDER — FENTANYL CITRATE 50 UG/ML
INJECTION, SOLUTION INTRAMUSCULAR; INTRAVENOUS AS NEEDED
Status: DISCONTINUED | OUTPATIENT
Start: 2023-11-28 | End: 2023-11-28

## 2023-11-28 RX ORDER — DEXAMETHASONE SODIUM PHOSPHATE 100 MG/10ML
INJECTION INTRAMUSCULAR; INTRAVENOUS AS NEEDED
Status: DISCONTINUED | OUTPATIENT
Start: 2023-11-28 | End: 2023-11-28

## 2023-11-28 RX ORDER — LABETALOL HYDROCHLORIDE 5 MG/ML
INJECTION, SOLUTION INTRAVENOUS
Status: COMPLETED
Start: 2023-11-28 | End: 2023-11-28

## 2023-11-28 RX ORDER — CALCIUM GLUCONATE 20 MG/ML
2 INJECTION, SOLUTION INTRAVENOUS EVERY 6 HOURS PRN
Status: DISCONTINUED | OUTPATIENT
Start: 2023-11-28 | End: 2023-11-29

## 2023-11-28 RX ORDER — ONDANSETRON HYDROCHLORIDE 2 MG/ML
INJECTION, SOLUTION INTRAVENOUS AS NEEDED
Status: DISCONTINUED | OUTPATIENT
Start: 2023-11-28 | End: 2023-11-28

## 2023-11-28 RX ORDER — MANNITOL 20 G/100ML
INJECTION, SOLUTION INTRAVENOUS CONTINUOUS PRN
Status: DISCONTINUED | OUTPATIENT
Start: 2023-11-28 | End: 2023-11-28

## 2023-11-28 RX ORDER — LABETALOL HYDROCHLORIDE 5 MG/ML
10 INJECTION, SOLUTION INTRAVENOUS EVERY 4 HOURS PRN
Status: DISCONTINUED | OUTPATIENT
Start: 2023-11-28 | End: 2023-11-30

## 2023-11-28 RX ORDER — NORETHINDRONE AND ETHINYL ESTRADIOL 0.5-0.035
KIT ORAL AS NEEDED
Status: DISCONTINUED | OUTPATIENT
Start: 2023-11-28 | End: 2023-11-28

## 2023-11-28 RX ORDER — SODIUM CHLORIDE, SODIUM LACTATE, POTASSIUM CHLORIDE, CALCIUM CHLORIDE 600; 310; 30; 20 MG/100ML; MG/100ML; MG/100ML; MG/100ML
75 INJECTION, SOLUTION INTRAVENOUS CONTINUOUS
Status: DISCONTINUED | OUTPATIENT
Start: 2023-11-28 | End: 2023-11-30

## 2023-11-28 RX ORDER — MAGNESIUM SULFATE HEPTAHYDRATE 40 MG/ML
2 INJECTION, SOLUTION INTRAVENOUS EVERY 6 HOURS PRN
Status: DISCONTINUED | OUTPATIENT
Start: 2023-11-28 | End: 2023-11-29

## 2023-11-28 RX ORDER — INSULIN LISPRO 100 [IU]/ML
0-10 INJECTION, SOLUTION INTRAVENOUS; SUBCUTANEOUS EVERY 4 HOURS
Status: DISCONTINUED | OUTPATIENT
Start: 2023-11-28 | End: 2023-11-29

## 2023-11-28 RX ORDER — DEXTROSE 50 % IN WATER (D50W) INTRAVENOUS SYRINGE
25
Status: DISCONTINUED | OUTPATIENT
Start: 2023-11-28 | End: 2023-11-30

## 2023-11-28 RX ORDER — MAGNESIUM SULFATE HEPTAHYDRATE 40 MG/ML
4 INJECTION, SOLUTION INTRAVENOUS EVERY 6 HOURS PRN
Status: DISCONTINUED | OUTPATIENT
Start: 2023-11-28 | End: 2023-11-29

## 2023-11-28 RX ORDER — METHADONE HYDROCHLORIDE 10 MG/ML
10 INJECTION, SOLUTION INTRAMUSCULAR; INTRAVENOUS; SUBCUTANEOUS ONCE
Status: COMPLETED | OUTPATIENT
Start: 2023-11-28 | End: 2023-11-28

## 2023-11-28 RX ORDER — NAPROXEN SODIUM 220 MG/1
81 TABLET, FILM COATED ORAL DAILY
Status: DISCONTINUED | OUTPATIENT
Start: 2023-11-28 | End: 2023-12-05 | Stop reason: HOSPADM

## 2023-11-28 RX ORDER — ESMOLOL HYDROCHLORIDE 10 MG/ML
INJECTION INTRAVENOUS AS NEEDED
Status: DISCONTINUED | OUTPATIENT
Start: 2023-11-28 | End: 2023-11-28

## 2023-11-28 RX ORDER — SODIUM BICARBONATE 1 MEQ/ML
SYRINGE (ML) INTRAVENOUS AS NEEDED
Status: DISCONTINUED | OUTPATIENT
Start: 2023-11-28 | End: 2023-11-28

## 2023-11-28 RX ORDER — LABETALOL HYDROCHLORIDE 5 MG/ML
INJECTION, SOLUTION INTRAVENOUS AS NEEDED
Status: DISCONTINUED | OUTPATIENT
Start: 2023-11-28 | End: 2023-11-28

## 2023-11-28 RX ORDER — HEPARIN SODIUM 1000 [USP'U]/ML
INJECTION, SOLUTION INTRAVENOUS; SUBCUTANEOUS AS NEEDED
Status: DISCONTINUED | OUTPATIENT
Start: 2023-11-28 | End: 2023-11-28

## 2023-11-28 RX ORDER — PROTAMINE SULFATE 10 MG/ML
INJECTION, SOLUTION INTRAVENOUS AS NEEDED
Status: DISCONTINUED | OUTPATIENT
Start: 2023-11-28 | End: 2023-11-28

## 2023-11-28 RX ORDER — ACETAMINOPHEN 325 MG/1
TABLET ORAL AS NEEDED
Status: DISCONTINUED | OUTPATIENT
Start: 2023-11-28 | End: 2023-11-28

## 2023-11-28 RX ORDER — NAPROXEN SODIUM 220 MG/1
81 TABLET, FILM COATED ORAL DAILY
Status: DISCONTINUED | OUTPATIENT
Start: 2023-11-28 | End: 2023-11-28

## 2023-11-28 RX ORDER — ONDANSETRON HYDROCHLORIDE 2 MG/ML
4 INJECTION, SOLUTION INTRAVENOUS EVERY 4 HOURS PRN
Status: DISCONTINUED | OUTPATIENT
Start: 2023-11-28 | End: 2023-11-30

## 2023-11-28 RX ORDER — CEFAZOLIN SODIUM 2 G/100ML
2 INJECTION, SOLUTION INTRAVENOUS EVERY 8 HOURS
Status: COMPLETED | OUTPATIENT
Start: 2023-11-28 | End: 2023-11-29

## 2023-11-28 RX ORDER — PROPOFOL 10 MG/ML
INJECTION, EMULSION INTRAVENOUS AS NEEDED
Status: DISCONTINUED | OUTPATIENT
Start: 2023-11-28 | End: 2023-11-28

## 2023-11-28 RX ORDER — HYDROMORPHONE HYDROCHLORIDE 1 MG/ML
0.2 INJECTION, SOLUTION INTRAMUSCULAR; INTRAVENOUS; SUBCUTANEOUS EVERY 4 HOURS PRN
Status: DISCONTINUED | OUTPATIENT
Start: 2023-11-28 | End: 2023-11-29

## 2023-11-28 RX ORDER — METHADONE HYDROCHLORIDE 10 MG/1
TABLET ORAL AS NEEDED
Status: DISCONTINUED | OUTPATIENT
Start: 2023-11-28 | End: 2023-11-28

## 2023-11-28 RX ORDER — ALBUMIN HUMAN 50 G/1000ML
SOLUTION INTRAVENOUS AS NEEDED
Status: DISCONTINUED | OUTPATIENT
Start: 2023-11-28 | End: 2023-11-28

## 2023-11-28 RX ORDER — DEXMEDETOMIDINE HYDROCHLORIDE 4 UG/ML
INJECTION, SOLUTION INTRAVENOUS CONTINUOUS PRN
Status: DISCONTINUED | OUTPATIENT
Start: 2023-11-28 | End: 2023-11-28

## 2023-11-28 RX ORDER — MIDAZOLAM HYDROCHLORIDE 1 MG/ML
INJECTION, SOLUTION INTRAMUSCULAR; INTRAVENOUS AS NEEDED
Status: DISCONTINUED | OUTPATIENT
Start: 2023-11-28 | End: 2023-11-28

## 2023-11-28 RX ORDER — BUPIVACAINE HYDROCHLORIDE 5 MG/ML
INJECTION, SOLUTION PERINEURAL AS NEEDED
Status: DISCONTINUED | OUTPATIENT
Start: 2023-11-28 | End: 2023-11-28

## 2023-11-28 RX ORDER — HYDRALAZINE HYDROCHLORIDE 20 MG/ML
INJECTION INTRAMUSCULAR; INTRAVENOUS
Status: COMPLETED
Start: 2023-11-28 | End: 2023-11-28

## 2023-11-28 RX ORDER — POTASSIUM CHLORIDE 29.8 MG/ML
40 INJECTION INTRAVENOUS EVERY 6 HOURS PRN
Status: DISCONTINUED | OUTPATIENT
Start: 2023-11-28 | End: 2023-11-29

## 2023-11-28 RX ORDER — CALCIUM GLUCONATE 20 MG/ML
1 INJECTION, SOLUTION INTRAVENOUS EVERY 6 HOURS PRN
Status: DISCONTINUED | OUTPATIENT
Start: 2023-11-28 | End: 2023-11-29

## 2023-11-28 RX ORDER — CEFAZOLIN 1 G/1
INJECTION, POWDER, FOR SOLUTION INTRAVENOUS AS NEEDED
Status: DISCONTINUED | OUTPATIENT
Start: 2023-11-28 | End: 2023-11-28

## 2023-11-28 RX ORDER — HYDROMORPHONE HYDROCHLORIDE 1 MG/ML
0.4 INJECTION, SOLUTION INTRAMUSCULAR; INTRAVENOUS; SUBCUTANEOUS EVERY 4 HOURS PRN
Status: DISCONTINUED | OUTPATIENT
Start: 2023-11-28 | End: 2023-11-29

## 2023-11-28 RX ORDER — LABETALOL HYDROCHLORIDE 5 MG/ML
10 INJECTION, SOLUTION INTRAVENOUS ONCE
Status: DISCONTINUED | OUTPATIENT
Start: 2023-11-28 | End: 2023-11-29

## 2023-11-28 RX ORDER — ONDANSETRON HYDROCHLORIDE 2 MG/ML
4 INJECTION, SOLUTION INTRAVENOUS ONCE
Status: COMPLETED | OUTPATIENT
Start: 2023-11-28 | End: 2023-11-28

## 2023-11-28 RX ORDER — HEPARIN SODIUM 5000 [USP'U]/ML
5000 INJECTION, SOLUTION INTRAVENOUS; SUBCUTANEOUS EVERY 8 HOURS
Status: DISCONTINUED | OUTPATIENT
Start: 2023-11-28 | End: 2023-12-05 | Stop reason: HOSPADM

## 2023-11-28 RX ORDER — DEXTROSE MONOHYDRATE 100 MG/ML
0.3 INJECTION, SOLUTION INTRAVENOUS ONCE AS NEEDED
Status: DISCONTINUED | OUTPATIENT
Start: 2023-11-28 | End: 2023-11-30

## 2023-11-28 RX ORDER — SODIUM CHLORIDE, SODIUM LACTATE, POTASSIUM CHLORIDE, CALCIUM CHLORIDE 600; 310; 30; 20 MG/100ML; MG/100ML; MG/100ML; MG/100ML
INJECTION, SOLUTION INTRAVENOUS CONTINUOUS PRN
Status: DISCONTINUED | OUTPATIENT
Start: 2023-11-28 | End: 2023-11-28

## 2023-11-28 RX ORDER — LABETALOL HYDROCHLORIDE 5 MG/ML
10 INJECTION, SOLUTION INTRAVENOUS ONCE
Status: COMPLETED | OUTPATIENT
Start: 2023-11-28 | End: 2023-11-28

## 2023-11-28 RX ORDER — ROCURONIUM BROMIDE 10 MG/ML
INJECTION, SOLUTION INTRAVENOUS AS NEEDED
Status: DISCONTINUED | OUTPATIENT
Start: 2023-11-28 | End: 2023-11-28

## 2023-11-28 RX ADMIN — CEFAZOLIN 2 G: 1 INJECTION, POWDER, FOR SOLUTION INTRAMUSCULAR; INTRAVENOUS at 11:51

## 2023-11-28 RX ADMIN — BUPIVACAINE HYDROCHLORIDE 10 ML: 5 INJECTION, SOLUTION PERINEURAL at 13:45

## 2023-11-28 RX ADMIN — LABETALOL HYDROCHLORIDE 5 MG: 5 INJECTION, SOLUTION INTRAVENOUS at 14:12

## 2023-11-28 RX ADMIN — FENTANYL CITRATE 50 MCG: 50 INJECTION, SOLUTION INTRAMUSCULAR; INTRAVENOUS at 09:40

## 2023-11-28 RX ADMIN — METHADONE HYDROCHLORIDE 10 MG: 10 INJECTION, SOLUTION INTRAMUSCULAR; INTRAVENOUS; SUBCUTANEOUS at 09:11

## 2023-11-28 RX ADMIN — SODIUM CHLORIDE, POTASSIUM CHLORIDE, SODIUM LACTATE AND CALCIUM CHLORIDE 75 ML/HR: 600; 310; 30; 20 INJECTION, SOLUTION INTRAVENOUS at 15:19

## 2023-11-28 RX ADMIN — HEPARIN SODIUM 8000 UNITS: 1000 INJECTION INTRAVENOUS; SUBCUTANEOUS at 10:44

## 2023-11-28 RX ADMIN — ONDANSETRON 4 MG: 2 INJECTION, SOLUTION INTRAMUSCULAR; INTRAVENOUS at 13:46

## 2023-11-28 RX ADMIN — PROPOFOL 50 MG: 10 INJECTION, EMULSION INTRAVENOUS at 09:40

## 2023-11-28 RX ADMIN — METHADONE HYDROCHLORIDE 10 MG: 10 TABLET ORAL at 07:55

## 2023-11-28 RX ADMIN — MAGNESIUM SULFATE 4 G: 4 INJECTION INTRAVENOUS at 20:21

## 2023-11-28 RX ADMIN — EPHEDRINE SULFATE 10 MG: 50 INJECTION, SOLUTION INTRAVENOUS at 09:48

## 2023-11-28 RX ADMIN — ACETAMINOPHEN 975 MG: 325 TABLET ORAL at 07:55

## 2023-11-28 RX ADMIN — CALCIUM CHLORIDE 500 MG: 100 INJECTION INTRAVENOUS; INTRAVENTRICULAR at 13:07

## 2023-11-28 RX ADMIN — ASPIRIN 81 MG CHEWABLE TABLET 81 MG: 81 TABLET CHEWABLE at 18:49

## 2023-11-28 RX ADMIN — ONDANSETRON 4 MG: 2 INJECTION INTRAMUSCULAR; INTRAVENOUS at 15:45

## 2023-11-28 RX ADMIN — ROCURONIUM BROMIDE 70 MG: 50 INJECTION, SOLUTION INTRAVENOUS at 08:30

## 2023-11-28 RX ADMIN — ROCURONIUM BROMIDE 10 MG: 50 INJECTION, SOLUTION INTRAVENOUS at 10:10

## 2023-11-28 RX ADMIN — ONDANSETRON 4 MG: 2 INJECTION INTRAMUSCULAR; INTRAVENOUS at 17:56

## 2023-11-28 RX ADMIN — PROTAMINE SULFATE 40 MG: 10 INJECTION, SOLUTION INTRAVENOUS at 12:23

## 2023-11-28 RX ADMIN — CALCIUM CHLORIDE 500 MG: 100 INJECTION INTRAVENOUS; INTRAVENTRICULAR at 12:58

## 2023-11-28 RX ADMIN — ALBUMIN (HUMAN) 500 ML: 12.5 SOLUTION INTRAVENOUS at 12:07

## 2023-11-28 RX ADMIN — EPHEDRINE SULFATE 5 MG: 50 INJECTION, SOLUTION INTRAVENOUS at 10:02

## 2023-11-28 RX ADMIN — MANNITOL: 20 INJECTION, SOLUTION INTRAVENOUS at 10:09

## 2023-11-28 RX ADMIN — LABETALOL HYDROCHLORIDE 10 MG: 5 INJECTION, SOLUTION INTRAVENOUS at 16:08

## 2023-11-28 RX ADMIN — HYDROMORPHONE HYDROCHLORIDE 0.2 MG: 1 INJECTION, SOLUTION INTRAMUSCULAR; INTRAVENOUS; SUBCUTANEOUS at 22:45

## 2023-11-28 RX ADMIN — HEPARIN SODIUM 3000 UNITS: 1000 INJECTION INTRAVENOUS; SUBCUTANEOUS at 10:53

## 2023-11-28 RX ADMIN — PROPOFOL 250 MG: 10 INJECTION, EMULSION INTRAVENOUS at 08:30

## 2023-11-28 RX ADMIN — ESMOLOL HYDROCHLORIDE 50 MG: 10 INJECTION, SOLUTION INTRAVENOUS at 08:38

## 2023-11-28 RX ADMIN — HYDROMORPHONE HYDROCHLORIDE 0.2 MG: 1 INJECTION, SOLUTION INTRAMUSCULAR; INTRAVENOUS; SUBCUTANEOUS at 18:31

## 2023-11-28 RX ADMIN — ESMOLOL HYDROCHLORIDE 50 MG: 10 INJECTION, SOLUTION INTRAVENOUS at 08:30

## 2023-11-28 RX ADMIN — DEXAMETHASONE SODIUM PHOSPHATE 4 MG: 10 INJECTION INTRAMUSCULAR; INTRAVENOUS at 08:30

## 2023-11-28 RX ADMIN — CEFAZOLIN 2 G: 1 INJECTION, POWDER, FOR SOLUTION INTRAMUSCULAR; INTRAVENOUS at 08:05

## 2023-11-28 RX ADMIN — CEFAZOLIN SODIUM 2 G: 2 INJECTION, SOLUTION INTRAVENOUS at 20:06

## 2023-11-28 RX ADMIN — LABETALOL HYDROCHLORIDE 5 MG: 5 INJECTION, SOLUTION INTRAVENOUS at 14:16

## 2023-11-28 RX ADMIN — HYDRALAZINE HYDROCHLORIDE 10 MG: 20 INJECTION INTRAMUSCULAR; INTRAVENOUS at 17:26

## 2023-11-28 RX ADMIN — SODIUM BICARBONATE 50 MEQ: 84 INJECTION INTRAVENOUS at 12:58

## 2023-11-28 RX ADMIN — SUGAMMADEX 200 MG: 100 INJECTION, SOLUTION INTRAVENOUS at 13:52

## 2023-11-28 RX ADMIN — SODIUM CHLORIDE, POTASSIUM CHLORIDE, SODIUM LACTATE AND CALCIUM CHLORIDE: 600; 310; 30; 20 INJECTION, SOLUTION INTRAVENOUS at 07:00

## 2023-11-28 RX ADMIN — SODIUM CHLORIDE, POTASSIUM CHLORIDE, SODIUM LACTATE AND CALCIUM CHLORIDE 215 ML/HR: 600; 310; 30; 20 INJECTION, SOLUTION INTRAVENOUS at 15:25

## 2023-11-28 RX ADMIN — HEPARIN SODIUM 5000 UNITS: 5000 INJECTION INTRAVENOUS; SUBCUTANEOUS at 21:48

## 2023-11-28 RX ADMIN — LABETALOL HYDROCHLORIDE 10 MG: 5 INJECTION, SOLUTION INTRAVENOUS at 14:45

## 2023-11-28 RX ADMIN — ONDANSETRON 4 MG: 2 INJECTION INTRAMUSCULAR; INTRAVENOUS at 20:05

## 2023-11-28 RX ADMIN — MIDAZOLAM 2 MG: 1 INJECTION INTRAMUSCULAR; INTRAVENOUS at 07:47

## 2023-11-28 RX ADMIN — DEXMEDETOMIDINE HYDROCHLORIDE 0.4 MCG/KG/HR: 4 INJECTION, SOLUTION INTRAVENOUS at 10:34

## 2023-11-28 RX ADMIN — ROCURONIUM BROMIDE 20 MG: 50 INJECTION, SOLUTION INTRAVENOUS at 09:30

## 2023-11-28 SDOH — HEALTH STABILITY: MENTAL HEALTH: CURRENT SMOKER: 0

## 2023-11-28 ASSESSMENT — COGNITIVE AND FUNCTIONAL STATUS - GENERAL
MOVING FROM LYING ON BACK TO SITTING ON SIDE OF FLAT BED WITH BEDRAILS: A LITTLE
HELP NEEDED FOR BATHING: A LITTLE
DAILY ACTIVITIY SCORE: 19
TURNING FROM BACK TO SIDE WHILE IN FLAT BAD: A LITTLE
MOBILITY SCORE: 18
DAILY ACTIVITIY SCORE: 18
PERSONAL GROOMING: A LITTLE
TURNING FROM BACK TO SIDE WHILE IN FLAT BAD: A LITTLE
TOILETING: A LITTLE
MOVING TO AND FROM BED TO CHAIR: A LITTLE
DRESSING REGULAR LOWER BODY CLOTHING: A LITTLE
WALKING IN HOSPITAL ROOM: A LITTLE
DRESSING REGULAR LOWER BODY CLOTHING: A LITTLE
MOVING FROM LYING ON BACK TO SITTING ON SIDE OF FLAT BED WITH BEDRAILS: A LITTLE
EATING MEALS: A LITTLE
MOBILITY SCORE: 16
TOILETING: A LITTLE
CLIMB 3 TO 5 STEPS WITH RAILING: A LITTLE
DRESSING REGULAR UPPER BODY CLOTHING: A LITTLE
HELP NEEDED FOR BATHING: A LITTLE
STANDING UP FROM CHAIR USING ARMS: A LITTLE
CLIMB 3 TO 5 STEPS WITH RAILING: A LOT
STANDING UP FROM CHAIR USING ARMS: A LITTLE
PERSONAL GROOMING: A LITTLE
MOVING TO AND FROM BED TO CHAIR: A LITTLE
WALKING IN HOSPITAL ROOM: A LOT
DRESSING REGULAR UPPER BODY CLOTHING: A LITTLE

## 2023-11-28 ASSESSMENT — COLUMBIA-SUICIDE SEVERITY RATING SCALE - C-SSRS
6. HAVE YOU EVER DONE ANYTHING, STARTED TO DO ANYTHING, OR PREPARED TO DO ANYTHING TO END YOUR LIFE?: NO
1. IN THE PAST MONTH, HAVE YOU WISHED YOU WERE DEAD OR WISHED YOU COULD GO TO SLEEP AND NOT WAKE UP?: NO
2. HAVE YOU ACTUALLY HAD ANY THOUGHTS OF KILLING YOURSELF?: NO

## 2023-11-28 ASSESSMENT — PAIN - FUNCTIONAL ASSESSMENT
PAIN_FUNCTIONAL_ASSESSMENT: 0-10

## 2023-11-28 ASSESSMENT — PAIN SCALES - GENERAL
PAINLEVEL_OUTOF10: 6
PAINLEVEL_OUTOF10: 0 - NO PAIN
PAIN_LEVEL: 1
PAINLEVEL_OUTOF10: 5 - MODERATE PAIN
PAINLEVEL_OUTOF10: 1
PAINLEVEL_OUTOF10: 6
PAINLEVEL_OUTOF10: 0 - NO PAIN
PAINLEVEL_OUTOF10: 3

## 2023-11-28 ASSESSMENT — ENCOUNTER SYMPTOMS
RESPIRATORY NEGATIVE: 1
CONSTITUTIONAL NEGATIVE: 1
GASTROINTESTINAL NEGATIVE: 1
CARDIOVASCULAR NEGATIVE: 1
NEUROLOGICAL NEGATIVE: 1

## 2023-11-28 NOTE — SIGNIFICANT EVENT
S/p right renal artery bypass, left renal artery aneurysm resection    Vascular Exam: palpable bilateral PT/DP pulses    Plan:  - q1 Neurovascular checks  - Pain control per ICU, ok for PCA  - valium 5mg PRN for muscle spasm  - bronchopulm hygeine   - Impulse control: SBP <140 mmHg  - Perfusion goal: MAP >65  - aspirin 81mg daily  - NPO, ok for PO meds  - Resus IV fluids- 150cc/hr  - 1/2 to 1 replacement with LR for 12 hrs (mannitol given intra op) till 2am  - Labs -q12 for 24 hrs  - monitor UOP, continue dickson  - Ancef x2 doses  - PT/OT tomorrow  - SCDs and SQH for DVT ppx    Shiv House MD  Vascular Surgery PGY-4  Team pager: [phone]

## 2023-11-28 NOTE — ANESTHESIA PROCEDURE NOTES
Airway  Date/Time: 11/28/2023 8:36 AM  Urgency: elective    Airway not difficult    Staffing  Performed: resident   Authorized by: Nohemy Rao MD    Performed by: Shaq Sanford MD  Patient location during procedure: OR    Indications and Patient Condition  Indications for airway management: anesthesia  Spontaneous Ventilation: absent  Sedation level: deep  Preoxygenated: yes  Patient position: sniffing  Mask difficulty assessment: 1 - vent by mask  Planned trial extubation    Final Airway Details  Final airway type: endotracheal airway      Successful airway: ETT  Cuffed: yes   Successful intubation technique: direct laryngoscopy  Facilitating devices/methods: intubating stylet  Endotracheal tube insertion site: oral  Blade: Wilver  Blade size: #4  ETT size (mm): 7.5  Cormack-Lehane Classification: grade I - full view of glottis  Placement verified by: chest auscultation and capnometry   Inital cuff pressure (cm H2O): 10  Measured from: lips  ETT to lips (cm): 21  Number of attempts at approach: 1

## 2023-11-28 NOTE — H&P
History Of Present Illness  Patrick Mckeon is a 28 y.o. 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   Patient was was seen in August 2023 for evaluation of hypertension, transaminitis and microscopic hematuria. Patient was diagnosed with Alport Syndrome in this workup. Subsequent arteriography showed dimished perfusion of left kidney due to a stenotic left renal artery that becomes aneurysmal. The right renal artery had significant collateralization with lumbars and internal thoracic arteries. Vascular findings in the setting of a connective tissue disorder prompted consideration for surgical intervention done 11/28/2023. Patient was seen in the ED for chest tightness in Oct 2023. Workup was unremarkable for ACS and chief complaint attributed to GERD and/or anxiety. Notable findings in ED visit were LVH on EKG and a narrow mediastinum appreciated on CXR. Perioperative evaluation on 11/17 found no contraindications for surgery planned for 11/28.      OR course:  EBL:  200 ml  UOP: 1000 ml  Crystalloid: 2500 ml  Colloid: None  Cellsaver: 500 ml  Products: None   Intubation: Easy intubation   Access: RIJ MAC, Right radial A line      Past Medical History  Past Medical History:   Diagnosis Date    Anxiety     Depression     GERD (gastroesophageal reflux disease)     Hyperlipidemia     Hypertension     Renal artery stenosis (CMS/HCC)     Nep: MAN Moreno 8/2023    Renal disease due to hypertension        Surgical History  Past Surgical History:   Procedure Laterality Date    CT ABDOMEN PELVIS ANGIOGRAM W AND/OR WO IV CONTRAST  07/03/2023    CT ABDOMEN PELVIS  ANGIOGRAM W AND/OR WO IV CONTRAST 7/3/2023 [facility]    CT ANGIO NECK  07/25/2023    CT NECK ANGIO W AND WO IV CONTRAST 7/25/2023 [facility] CT    CT HEAD ANGIO W AND WO IV CONTRAST  07/25/2023    CT HEAD ANGIO W AND WO IV CONTRAST 7/25/2023 [facility] CT    INVASIVE VASCULAR PROCEDURE Bilateral 10/12/2023    Procedure: Upper Extremity Angiogram;  Surgeon: Jovany Kelly MD;  Location: [facility] Card[facility];  Service: Vascular Surgery;  Laterality: Bilateral;    IR ANGIOGRAM RENAL BILATERAL Bilateral 08/15/2023    IR ANGIOGRAM RENAL BILATERAL 8/15/2023 [facility]    OTHER SURGICAL HISTORY  11/22/2022    Lipoma excision        Social History  He reports that he has never smoked. He has never used smokeless tobacco. He reports that he does not currently use alcohol. He reports that he does not use drugs.    Family History  Family History   Problem Relation Name Age of Onset    Coronary artery disease Father      Hyperlipidemia Father      Coronary artery disease Father's Sister      Other (CABG) Father's Sister      Coronary artery disease Paternal Grandmother      Heart attack Paternal Grandmother      Coronary artery disease Paternal Grandfather      Heart attack Paternal Grandfather          Allergies  Amlodipine    Review of Systems   Constitutional: Negative.    HENT: Negative.     Respiratory: Negative.     Cardiovascular: Negative.    Gastrointestinal: Negative.    Genitourinary: Negative.    Skin: Negative.    Neurological: Negative.         Physical Exam  Constitutional:       Appearance: Normal appearance.   HENT:      Head: Normocephalic and atraumatic.      Mouth/Throat:      Mouth: Mucous membranes are moist.   Cardiovascular:      Rate and Rhythm: Normal rate and regular rhythm.   Pulmonary:      Effort: Pulmonary effort is normal.      Breath sounds: Normal breath sounds.   Abdominal:      General: Abdomen is flat. Bowel sounds are normal.      Palpations: Abdomen is soft.   Genitourinary:     Comments: Jonh  "draining clear urine   Musculoskeletal:      Cervical back: Neck supple.   Skin:     General: Skin is warm and dry.   Neurological:      General: No focal deficit present.      Mental Status: He is alert and oriented to person, place, and time.          Last Recorded Vitals  Blood pressure (!) 136/92, pulse 75, temperature 36.4 °C (97.5 °F), temperature source Tympanic, resp. rate 15, height 1.778 m (5' 10\"), weight 66.1 kg (145 lb 11.6 oz), SpO2 100 %.    Relevant Results  No current facility-administered medications on file prior to encounter.     Current Outpatient Medications on File Prior to Encounter   Medication Sig Dispense Refill    aspirin 81 mg EC tablet Take 1 tablet (81 mg) by mouth once daily.      cetirizine (ZyrTEC) 10 mg capsule Take 1 capsule (10 mg) by mouth once daily.      lisinopril 20 mg tablet Take 1 tablet (20 mg) by mouth once daily.      rosuvastatin (Crestor) 10 mg tablet TAKE 1 TABLET (10 MG) BY MOUTH ONCE DAILY AT BEDTIME. 90 tablet 1    triamcinolone (Nasacort) 55 mcg nasal inhaler Administer 2 sprays into each nostril once daily.      [DISCONTINUED] ergocalciferol, vitamin D2, (VITAMIN D2 ORAL) Take 1 tablet by mouth once daily.      [DISCONTINUED] herbal drugs (Calmme) tablet Take by mouth.         Component      Latest Ref Rng 10/19/2023 11/17/2023   GLUCOSE      74 - 99 mg/dL  108 (H)    SODIUM      136 - 145 mmol/L  138    POTASSIUM      3.5 - 5.3 mmol/L  4.3    CHLORIDE      98 - 107 mmol/L  103    Bicarbonate      21 - 32 mmol/L  25    Anion Gap      10 - 20 mmol/L  14    Blood Urea Nitrogen      6 - 23 mg/dL  6    Creatinine      0.50 - 1.30 mg/dL  0.84    EGFR      >60 mL/min/1.73m*2  >90    Calcium      8.6 - 10.6 mg/dL  9.0    WBC      4.4 - 11.3 x10*3/uL  3.1 (L)    nRBC      0.0 - 0.0 /100 WBCs  0.0    RBC      4.50 - 5.90 x10*6/uL  4.96    HEMOGLOBIN      13.5 - 17.5 g/dL  13.9    HEMATOCRIT      41.0 - 52.0 %  43.5    MCV      80 - 100 fL  88    MCH      26.0 - 34.0 pg  " 28.0    MCHC      32.0 - 36.0 g/dL  32.0    RED CELL DISTRIBUTION WIDTH      11.5 - 14.5 %  12.1    Platelets      150 - 450 x10*3/uL  258    Protime      9.8 - 12.8 seconds 11.4     INR      0.9 - 1.1  1.0           Assessment/Plan     Patrick Mckeon is a 28 y.o. 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       NEURO: Hx Depression and Anxiety .Neuro intact, MAEx4 to commands, no focal deficits. Post op pain  - b/l LAURA blocks performed preoperatively   - hydromorphone as needed pain  - Hourly Neurovascular monitoring  -Home meds: Atarax 25 mg daily     CV: Hyperlipidemia and Hypertension . SBP in the 180's after arrival to SICU. Sp labetalol 10 mg IV x1 and Hydralazine 10 mg IV x 1  Fibromuscular dysplasia of bilateral renal arteries sp  Left renal artery aneurysm resection and Right renal artery bypass  on 11/28/2023 with   - goal sbp 100-150 per vascular surgery   -start aspirin today   - continuous ekg/abp/pap monitoring  -Home meds: Lisinopril 20 mg daily, Aspirin 81 mg daily, and Crestor 10 mg daily     PULM: Arrived to SICU on 10 l SIMPLE MASK  - Wean to  as tolerated.   - Q1h incentive spirometer while awake  - F/U post op cxr    GI: Strict NPO. OK for oral aspirin  aspirin     : Baseline cr ~ 0.8   - Check renal function panel post op and daily   - Maintenance IVF with LT @ 75 ml/hr   -Additional fluid replacement with 0.5 ml LR for each 1 ml of urine x 12 hours   - Volume resuscitation as needed.    - Goal U/O >0.5ml/kg/hr.    - Replete electrolytes to goal K>4, Mg>2, Phos>2.5, ionized Ca>1.10 if creatinine remains WNL or <1.5 with adequate uop.    HEME: Acute surgical blood loss  anemia  - Check CBC, coags, fibrinogen post op and daily  - scds for dvt prophylaxis.  - start SQH tonight    ENDO:  - Q4h BG and SSI Lispro per ICU protocol.    ID: afebrile, no leukocytosis  - Cefazolin for 24 hrs  - monitor for s/s infection    Lines:   11/28 RIJ MAC  11/28 Right radial A line     Dispo: continue ICU care. Reassess dispo in am. Discussed with        Critical care time: 60 min       Myesha Montes PA-C    Team phone [phone]

## 2023-11-28 NOTE — PROCEDURES
Peripheral Block    Patient location during procedure: pre-op  Start time: 11/28/2023 7:45 AM  End time: 11/28/2023 7:56 AM  Reason for block: post-op pain management  Staffing  Performed: resident   Authorized by: Bahman Bolanos MD    Performed by: Bahman Bolanos MD  Preanesthetic Checklist  Completed: patient identified, IV checked, site marked, risks and benefits discussed, surgical consent, monitors and equipment checked, pre-op evaluation and timeout performed   Timeout performed at: 11/28/2023 7:56 AM  Peripheral Block  Patient position: sitting  Prep: ChloraPrep  Patient monitoring: heart rate and continuous pulse ox  Block type: LAURA  Laterality: B/L  Injection technique: catheter  Guidance: ultrasound guided  Local infiltration: ropivacaine  Infiltration strength: 0.5 %  Dose: 30 mL  Needle  Needle type: Tuohy   Needle gauge: 26 G  Needle length: 8 cm  Needle localization: ultrasound guidance     image stored in chart  Catheter type: multiorifice  Assessment  Injection assessment: negative aspiration for heme, no paresthesia on injection, incremental injection and local visualized surrounding nerve on ultrasound  Additional Notes  Erector spinae plane block: Informed consent obtained.  Risks, benefits, and alternatives discussed.  ASA monitors placed, and timeout performed.  Patient positioned, prepped with chlorhexidine, and draped with sterile towels.  Ultrasound guidance used to visualize the erector spine a muscle above the TP/costal junction at t6.  Good visualization of the needle throughout duration of the procedure.  Aspiration was negative.  15 cc of 0.5 percent ropivacaine injected with visualization of spread bilaterally.  Catheters threaded and secured. Patient tolerated procedure well.    Timeout by Poli IVAN

## 2023-11-28 NOTE — ANESTHESIA PROCEDURE NOTES
Peripheral IV  Date/Time: 11/28/2023 7:15 AM      Placement  Needle size: 20 G  Laterality: left  Location: hand  Local anesthetic: injectable  Site prep: chlorhexidine  Technique: anatomical landmarks  Attempts: 1

## 2023-11-28 NOTE — PROGRESS NOTES
Pharmacy Medication History Review    Patrick Mckeon is a 28 y.o. male admitted for Renal artery stenosis (CMS/[facility]). Pharmacy reviewed the patient's [id number] medications and allergies for accuracy.    The list below reflects the updated PTA list. Comments regarding how patient may be taking medications differently can be found in the Admit Orders Activity  Prior to Admission Medications   Prescriptions Last Dose Informant   aspirin 81 mg EC tablet 11/27/2023 Self   Sig: Take 1 tablet (81 mg) by mouth once daily.   cetirizine (ZyrTEC) 10 mg capsule 11/26/2023 Self   Sig: Take 1 capsule (10 mg) by mouth once daily.   hydrOXYzine HCL (Atarax) 25 mg tablet     Sig: Take 1 tablet (25 mg) by mouth once daily for 10 days.   lisinopril 20 mg tablet 11/27/2023 Self   Sig: Take 1 tablet (20 mg) by mouth once daily.   rosuvastatin (Crestor) 10 mg tablet 11/27/2023 Self   Sig: TAKE 1 TABLET (10 MG) BY MOUTH ONCE DAILY AT BEDTIME.   triamcinolone (Nasacort) 55 mcg nasal inhaler 11/27/2023 Self   Sig: Administer 2 sprays into each nostril once daily.      Facility-Administered Medications: None        The list below reflects the updated allergy list. Please review each documented allergy for additional clarification and justification.  Allergies  Reviewed by Clary Ashton RN on 11/28/2023        Severity Reactions Comments    Amlodipine Not Specified Other swelling in lower extremities            M2B service not offered prior to surgery, please reassess prior to patient discharge if Meds to Beds is desired.    Sources used to complete the med history include out patient fill history, OARRS, and patient interview along with pat assessment 11/28/23     Below are additional concerns with the patient's PTA list.      José Luis Soto [facility].   Transitions of Care Pharmacist   Meds Ambulatory and Retail Services  Please reach out via Secure Chat for questions, or if no response call [facility] or [facility]

## 2023-11-28 NOTE — ANESTHESIA PROCEDURE NOTES
Peripheral IV  Date/Time: 11/28/2023 8:45 AM      Placement  Needle size: 14 G  Laterality: right  Location: forearm  Local anesthetic: none  Site prep: chlorhexidine  Technique: anatomical landmarks  Attempts: 1

## 2023-11-28 NOTE — ANESTHESIA PROCEDURE NOTES
Central Venous Line:    Date/Time: 11/28/2023 8:56 AM    A central venous line was placed in the OR for the following indication(s): central venous access and CVP monitoring.  Staffing  Performed: resident   Authorized by: Nohemy Rao MD    Performed by: Shaq Sanford MD    Sterility preparation included the following: provider hand hygiene performed prior to central venous catheter insertion, all 5 sterile barriers used (gloves, gown, cap, mask, large sterile drape) during central venous catheter insertion, antiseptic used during central venous catheter insertion and skin prep agent completely dried prior to procedure.  The patient was placed in Trendelenburg position.    Right internal jugular vein was prepped.    The site was prepped with Chlorhexidine.  Size: 9 Fr   Length: 11.5 (16 cm)  Catheter type: introducer   Number of Lumens: double lumen    This catheter was not an oximetric catheter.    During the procedure, the following specific steps were taken: target vein identified, needle advanced into vein and blood aspirated and guidewire advanced into vein.  Seldinger technique used.  Procedure performed using ultrasound guidance.  Sterile gel and probe cover used in ultrasound-guided central venous catheter insertion.    Intravenous verification was obtained by ultrasound, venous blood return and manometry.      Post insertion care included: all ports aspirated, all ports flushed easily, guidewire removed intact, Biopatch applied, line sutured in place and dressing applied.    During the procedure the patient experienced: patient tolerated procedure well with no complications.

## 2023-11-28 NOTE — BRIEF OP NOTE
Date: 23 OR Location: [facility] OR 16     Name: Patrick Mckeon, : 1995, Age: 28 y.o., MRN: [id number], Sex: male    Pre-op Diagnosis: Fibromuscular dysplasia, renovascular hypertension, left renal artery aneurysm  Post-op Diagnosis: Same    Procedures:   Left renal artery aneurysm resection  Right renal artery bypass with 6mm Dacron    Surgeons: Rudy Kelly MD; Noe Wilhelm MD    Resident/Fellow/Other Assistant: Shiv House MD    Anesthesia: general ASA: II  Anesthesia Staff: Anesthesiologist: Nohemy Rao MD  Anesthesia Resident: Shaq Sanford MD    Findings: occluded proximal right renal artery with post-stenotic dilatation and collateral filling, left renal artery, 2-4mm bilateral GSVs on ultrasound    Specimens: Right renal artery, Left renal artery aneurysm    Estimated Blood Loss: 200cc  Fluids and Transfusions: 490cc cell saver, 2500cc crystalloids  Urine Output: 1000cc  Lines: R IJ CVC, left radial A-line  Implants:   Implant Name Type Inv. Item Serial No.  Lot No. LRB No. Used Action   GRAFT, VASCUTEK STRAIGHT 6 X 30 - [id number] - [id number] Implant GRAFT, VASCUTEK STRAIGHT 6 X 30 [id number] [id number] Bilateral 1 Implanted     Complications: None  Condition: stable  Disposition: ICU - extubated and stable.    Shiv House MD

## 2023-11-28 NOTE — ANESTHESIA PREPROCEDURE EVALUATION
Patient: Patrick Mckeon    Procedure Information       Date/Time: 11/28/23 0715    Procedures:       Exploratory laparotomy, left renal artery aneurysm repair, right renal artery bypass, possible right kidney autotransplantation (Right)      Transplant Kidney (Right)    Location: [facility] OR 16 / [facility] OR    Surgeons: Jovany Kelly MD; Abel Mckeon MD            Relevant Problems   Anesthesia (within normal limits)      Cardiovascular   (+) Mixed hyperlipidemia   (+) Renal artery stenosis (CMS/HCC)   (+) Renovascular hypertension   (+) White coat syndrome with hypertension      /Renal   (+) Alport syndrome   (+) Renal cyst, left      Neuro/Psych   (+) BO (generalized anxiety disorder)   (+) Moderate episode of recurrent major depressive disorder (CMS/HCC)      Eyes, Ears, Nose, and Throat   (+) Alport syndrome       Clinical information reviewed:   Tobacco  Allergies    Med Hx  Surg Hx   Fam Hx  Soc Hx        NPO Detail:  NPO/Void Status  Date of Last Liquid: 11/28/23  Time of Last Liquid: 0500  Date of Last Solid: 11/27/23  Time of Last Solid: 2200  Last Intake Type: Clear fluids         Physical Exam    Airway  Mallampati: II  TM distance: <3 FB  Neck ROM: full     Cardiovascular   Rhythm: regular  Rate: normal     Dental    Pulmonary   Breath sounds clear to auscultation     Abdominal - normal exam             Anesthesia Plan    ASA 2     general     The patient is not a current smoker.  Patient was not previously instructed to abstain from smoking on day of procedure.  Patient did not smoke on day of procedure.    intravenous induction   Postoperative administration of opioids is intended.  Anesthetic plan and risks discussed with patient.  Use of blood products discussed with patient who consented to blood products.    Plan discussed with attending.

## 2023-11-28 NOTE — OP NOTE
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]     * Renovascular hypertension [I15.0]     * 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]     Procedures  Left renal artery aneurysm repair, right renal artery bypass  [id number] - OR REPAIR BLOOD VESSEL VEIN GRAFT INTRA-ABDOMINAL      Surgeons   WoMD Noe Brown MD    Resident/Fellow/Other Assistant:  Shiv House MD    Procedure Summary  Anesthesia: General  ASA: II  Anesthesia Staff: Anesthesiologist: Nohemy Rao MD  Anesthesia Resident: Shaq Sanford MD  Estimated Blood Loss: 200 mL  Intra-op Medications:   Medication Name Total Dose   sodium chloride 0.9 % irrigation solution 1,000 mL   thrombin (recombinant) (Recothrom) topical solution 10,000 Units   gelatin absorbable (Gelfoam) 100 sponge 1 each   lactated Ringer's irrigation solution 540 mL   heparin (porcine) 2,500 Units in sodium chloride 0.9 % 250 mL irrigation 2,500 Units   BUPivacaine HCl (Marcaine) 0.5 % (5 mg/mL) 20 mL syringe 16 mL   aspirin chewable tablet 81 mg Cannot be calculated   lactated Ringer's infusion 26.25 mL   lactated Ringer's infusion 53.75 mL   methadone (Dolophine) injection 10 mg 10 mg              Anesthesia Record               Intraprocedure I/O Totals          Intake    Dexmedetomidine 0.00 mL    The total shown is the total volume documented since Anesthesia Start was filed.    mannitol 20 % 125.00 mL    Propofol  Drip 0.00 mL    The total shown is the total volume documented since Anesthesia Start was filed.    lactated Ringer's 2500.00 mL    Cell Saver 490 mL    Total Intake 3115 mL       Output    Urine 880 mL    Est. Blood Loss 400 mL    Total Output 1280 mL       Net    Net Volume 1835 mL          Specimen:   ID Type Source Tests Collected by Time   1 : Aorta Tissue AORTA SURGICAL PATHOLOGY EXAM Jovany Kelly MD 11/28/2023 1115   2 : Right Renal Artery Tissue ARTERY RESECTION SURGICAL PATHOLOGY EXAM Jovany Kelly MD 11/28/2023 1152   3 : Left Renal Artery Aneurysm Tissue ARTERY RESECTION SURGICAL PATHOLOGY EXAM Jovany Kelly MD 11/28/2023 1200        Staff:   Circulator: Rosibel Bedolla RN  Relief Circulator: Jannet Gold RN  Relief Scrub: Freida Santos  Scrub Person: Jacey Guerra RN         Drains and/or Catheters:   Urethral Catheter Temperature probe 14 Fr. (Active)   Output (mL) 200 mL 11/28/23 1600       Tourniquet Times:         Implants:  Implants       Type Name Action Serial No.      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  He was noted to have perfusion to the upper half of the right kidney.  Right internal mammary artery that reconstituted through collaterals the main right renal artery.  Patient was noted to have elevated renin level 4 times that of the contralateral side.  He was also noted to have left renal artery aneurysm that measured about 1.5 cm that was saccular in shape in the proximal segment.  He now submits for left renal artery aneurysm repair and right renal artery bypass grafting to help with  renal ischemia-induced hypertension.    Procedure Details: After a plane of anesthesia was obtained the patient was prepared and draped in the usual sterile fashion.  A midline abdominal incision was made and the peritoneal cavity entered.  The retroperitoneum was incised and the aorta isolated and dissected free.  Dissection was continued to the level of the left renal vein which was isolated and dissected free with division of the left adrenal vein.  The left renal artery was identified and was noted to be aneurysmal with about 1.5 cm saccular aneurysm.  The proximal 5 mm of the left renal artery was spared from the aneurysmal process The left renal artery was mobilized for approximately 3 cm.  The left main renal artery was also identified and protected.  The patient was given mannitol, half a gram per kilogram body weight.    The right inferior renal artery that was originating at about 12:00 was isolated and dissected free.  The main right renal artery was identified and dissected for approximately 12-1/2 cm to his primary branching point.  The artery was noted to be thrombosed.  There was evidence of poststenotic dilatation.  The dorsal branch which served as the collateral pathway from the internal mammary artery and possibly the lumbar artery was also identified.  The right renal artery at this point measured approximately 5 to 6 mm in diameter.  It was deemed that bypass grafting using a prosthetic graft would be feasible, especially in light of the fact that patient's only saphenous vein was relatively small in size measuring 4 mm or less.    Following systemic heparinization control was obtained of the right renal artery branches.  The dorsal branch was ligated to facilitate the immobilization of the renal artery.  2 large clips were placed at the origin of the right renal artery although the vessel was already occluded.  A 6 mm woven polyester graft was fashioned to fit and sewn end to end to the right  renal artery with a running 6-0 Prolene.  The anastomosis was hemostatic.  The kidney was then perfused with 240 cc of cold perfusate.  The graft was then brought into approximation with the middle abdominal aorta.  Control was obtained of the lateral aspect of the aorta with a side-biting clamp.  A longitudinal aortotomy was made and a 6 mm aortic punch used to excise an ellipse of the aorta.  The graft was then fashioned to fit and sewn end-to-side with a running 5-0 Prolene suture.  After appropriate forward bleeding and bipolar bleeding was allowed to flow was restored to the right kidney.  The anastomosis was hemostatic.  Excellent Doppler signals were noted over the graft.  The right renal ischemic time was about 50 minutes.    Next attention was directed to the left renal artery.  Control was obtained of the left renal artery by placing a side-biting clamp on the aorta.  The left renal artery aneurysm was excised leaving a few millimeters of non-aneurysmal proximal renal artery.  The left kidney was then cold perfused with 240 cc of cold perfusate.  An attempt was made to reimplant the aneurysmal distal renal artery onto the stump of the proximal left renal artery, however, the exposure was not adequate.  Thus the aorta was cross-clamped above and below the left renal artery.  A 4 mm aortic punch was used to excise an ellipse of aorta at the level of the renal artery takeoff.  The left renal artery was then reimplanted onto the aorta with a running 7-0 Prolene suture.  After appropriate backbleeding and for bleeding were allowed flow was restored to the left renal artery.  Excellent Doppler signals were noted over the left renal artery.  The left renal ischemic time was about 45 to 50 minutes.    Heparin effects were reversed with protamine.  After hemostasis was assured the retroperitoneum was reapproximated with a catgut suture to protect the right renal artery bypass graft from the gastrointestinal tract.   The midline abdominal fascia was approximated with double-stranded 1 PDS sutures.  The skin was approximated with staples.    The patient made adequate amount of urine throughout the course of the operation.    The patient tolerated the procedure well without complications and was transferred to the CT ICU in guarded condition.    The left renal artery aneurysm, the right renal artery and the aortic punch specimen were sent to pathology.    Dr. Kelly and I worked together as co-surgeon's as there was no adequately trained vascular surgery trainees to assist with the operation.    Complications:  None; patient tolerated the procedure well.    Disposition: ICU - extubated and stable.  Condition: stable     Additional Details:     Attending Attestation: I was present and scrubbed for the entire procedure.    Noe Wilhelm MD

## 2023-11-28 NOTE — ANESTHESIA POSTPROCEDURE EVALUATION
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    Anesthesia Post Evaluation    Patient location during evaluation: ICU  Patient participation: complete - patient participated  Level of consciousness: awake and alert  Pain score: 1  Pain management: adequate  Airway patency: patent  Cardiovascular status: acceptable  Respiratory status: acceptable  Hydration status: acceptable  Postoperative Nausea and Vomiting: none        No notable events documented.

## 2023-11-28 NOTE — ANESTHESIA PROCEDURE NOTES
Arterial Line:    Date/Time: 11/28/2023 8:43 AM    Staffing  Performed: resident   Authorized by: Nohemy Rao MD    Performed by: Shaq Sanford MD    An arterial line was placed. Procedure performed using surface landmarks.in the OR for the following indication(s): continuous blood pressure monitoring and blood sampling needed.    A 20 gauge (size), 1 and 3/4 inch (length), Angiocath (type) catheter was placed into the Left radial artery, secured by Tegaderm,   Seldinger technique used.  Events:  patient tolerated procedure well with no complications.

## 2023-11-28 NOTE — OP NOTE
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]     * Renovascular hypertension [I15.0]     * 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]     Procedures  Left renal artery aneurysm repair, right renal artery bypass  95179 - NM REPAIR BLOOD VESSEL VEIN GRAFT INTRA-ABDOMINAL      Surgeons      * Jovany Kelly - Primary    Resident/Fellow/Other Assistant:  Surgeon(s) and Role:     * Noe Wilhelm MD - Assisting     * Shiv House MD - Resident - Assisting    Procedure Summary  Anesthesia: General  ASA: II  Anesthesia Staff: Anesthesiologist: Nohemy Rao MD  Anesthesia Resident: Shaq Sanford MD  Estimated Blood Loss: 400mL  Intra-op Medications:   Medication Name Total Dose   sodium chloride 0.9 % irrigation solution 1,000 mL   thrombin (recombinant) (Recothrom) topical solution 10,000 Units   gelatin absorbable (Gelfoam) 100 sponge 1 each   lactated Ringer's irrigation solution 540 mL   heparin (porcine) 2,500 Units in sodium chloride 0.9 % 250 mL irrigation 2,500 Units   BUPivacaine HCl (Marcaine) 0.5 % (5 mg/mL) 20 mL syringe 16 mL   methadone (Dolophine) injection 10 mg 10 mg              Anesthesia Record               Intraprocedure I/O Totals          Intake    Dexmedetomidine 0.00 mL    The total shown is the total volume documented since Anesthesia Start was filed.    mannitol 20 % 125.00 mL    Propofol Drip 0.00 mL    The total shown is the total volume  documented since Anesthesia Start was filed.    Cell Saver 490 mL    Total Intake 615 mL       Output    Urine 705 mL    Est. Blood Loss 400 mL    Total Output 1105 mL       Net    Net Volume -490 mL          Specimen:   ID Type Source Tests Collected by Time   1 : Aorta Tissue AORTA SURGICAL PATHOLOGY EXAM Jovany Kelly MD 11/28/2023 1115   2 : Right Renal Artery Tissue ARTERY BIOPSY SURGICAL PATHOLOGY EXAM Jovany Kelly MD 11/28/2023 1152   3 : Left Renal Artery Aneurysm Tissue ARTERY BIOPSY SURGICAL PATHOLOGY EXAM Jovany Kelly MD 11/28/2023 1200        Staff:   Circulator: Rosibel Bedolla RN  Relief Circulator: Jannet Gold RN  Relief Scrub: Freida Santos  Scrub Person: Jacey Guerra RN         Drains and/or Catheters:   Urethral Catheter Temperature probe 14 Fr. (Active)       Tourniquet Times:         Implants:  Implants       Type Name Action Serial No.      Implant GRAFT, VASCUTEK STRAIGHT 6 X 30 - [id number] - [id number] Implanted [id number]              Findings: Occluded major right renal artery, mildly aneurysmal common right renal artery trunk fed from ZORAN collateral, bypass from aorta to 6mm bifurcation of major right renal artery, left renal artery aneurysm resection and primary reattachement of 4mm artery to aorta.     Indications: Patrick Mckeon is an 28 y.o. 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     The patient was seen in the preoperative area. The risks, benefits, complications, treatment options, non-operative alternatives, expected recovery and outcomes were discussed with the patient. The possibilities of reaction to medication, pulmonary aspiration, injury to surrounding structures, bleeding, recurrent infection, the need for additional procedures, failure to diagnose a condition, and creating a  complication requiring transfusion or operation were discussed with the patient. The patient concurred with the proposed plan, giving informed consent.  The site of surgery was properly noted/marked if necessary per policy. The patient has been actively warmed in preoperative area. Preoperative antibiotics have been ordered and given within 1 hours of incision. Venous thrombosis prophylaxis are not indicated.      Procedure Details:     Supine position.  Because there were no qualified residents to help with this case, I engaged Dr. Noe Wilhelm as co surgeon for this case. His role was in performing and assisting in the complex exposure of this rare condition.     Chest abdomen groins and both legs prepped and draped.  Midline laparotomy created.  Omni retractor used to retract viscera away from the retroperitoneum.  Midline retroperitoneum opened to expose the abdominal aorta.  The left renal vein was fully mobilized by dividing the adrenal tributary and some small tethering tributaries.  This allowed us to move the vein down and up as needed to expose the complex vasculature.  The patient's right major renal artery which was only seen as a strand on the CT scan was found to be occluded and pulseless.  This was supplied by a dorsal collateral from the right internal thoracic artery and by moving the left renal vein cephalad and mobilizing the IVC the major trunk was visualized beyond the supply from the right internal thoracic artery.  This segment was mildly aneurysmal but the bifurcation point appeared to be suitable for excepting a bypass.  The saphenous veins were mapped on the table and did not exceed 4 mm in diameter and therefore I chose not to make extra incisions to explore the saphenous veins but rather chose to use a short dacryon graft to 6 mm.  The 6 mm Dacron graft was anastomosed end to and and then it in the side to the aorta after heparinizing the patient systemically.  The right kidney was  infused with 240 mL of cold LR after completing the distal anastomosis allowing for renal preservation and prior to clamping the patient was given systemic mannitol.  The proximal anastomosis was also completed with 5-0 Prolene suture and was hemostatic and flow into the right renal arteries were excellent.  The dorsal clot collateral artery from the right internal thoracic artery was clipped as was the major right renal artery from the aortic origin was clipped.  The congenitally atretic and mildly aneurysmal artery was then sent as a specimen as was the punch specimen from the aorta.  The left renal aneurysm was exposed by dropping the left renal vein caudad and mobilizing the renal artery beyond there appeared to be extra artery to allow for a primary reanastomosis of the nonaneurysmal artery to the aorta.  A side-biting clamp was applied initially but did not provide excellent exposure or hemostasis and therefore a suprarenal clamp was applied with hypogastric clamps for proximal distal control.  Total clamp time was 15 minutes.  The renal artery aneurysm on the left side was resected and sent for pathology and the artery was anastomosed back to the aorta.  At its origin was noted to be a tight scarred ring and this was excised with a punch of 4 mm and end-to-side anastomosis was performed with 7-0 Prolene.  The anastomosis was hemostatic and flow in the left superior renal artery was excellent.  The wound was irrigated and the retroperitoneum was closed with chromic catgut to cover the graft.  The abdomen was closed with PDS and the skin was closed with Monocryl and Vicryl.  The patient's blood loss was about 400 mL into the Cell Saver and what could be spun was given back to the patient.  Pathology specimens include right renal artery, aortic punch biopsy, and left renal artery aneurysm.  Complications:  None; patient tolerated the procedure well.    Disposition: ICU - extubated and stable.  Condition: stable          Additional Details:     Attending Attestation: I was present for the entire procedure.    Jovany Kelly  Phone Number: [phone]

## 2023-11-28 NOTE — CONSULTS
Consults  Acute Pain Service  Patrick Mckeon is a 28 y.o. year old male patient who presents for Ex lap, left renal artery aneurysm repair, right renal artery bypass with Dr. Kelly. Acute Pain consulted for block for postoperative pain control.     Anticipated Postop Pain Issues -   Palliative: typically relieved with IV analgesics and regional local anesthetics  Provocative: typically with movement  Quality: typically burning and aching  Radiation: typically none  Severity: typically severe 8-10/10  Timing: typically constant    Past Medical History:   Diagnosis Date    Anxiety     Depression     GERD (gastroesophageal reflux disease)     Hyperlipidemia     Hypertension     Renal artery stenosis (CMS/HCC)     Nep: Fabian Stokes, LOV 8/2023    Renal disease due to hypertension         Past Surgical History:   Procedure Laterality Date    CT ABDOMEN PELVIS ANGIOGRAM W AND/OR WO IV CONTRAST  07/03/2023    CT ABDOMEN PELVIS ANGIOGRAM W AND/OR WO IV CONTRAST 7/3/2023 [facility]    CT ANGIO NECK  07/25/2023    CT NECK ANGIO W AND WO IV CONTRAST 7/25/2023 [facility] CT    CT HEAD ANGIO W AND WO IV CONTRAST  07/25/2023    CT HEAD ANGIO W AND WO IV CONTRAST 7/25/2023 [facility] CT    INVASIVE VASCULAR PROCEDURE Bilateral 10/12/2023    Procedure: Upper Extremity Angiogram;  Surgeon: Jovany Kelly MD;  Location: [facility] Cardiac Cath Lab;  Service: Vascular Surgery;  Laterality: Bilateral;    IR ANGIOGRAM RENAL BILATERAL Bilateral 08/15/2023    IR ANGIOGRAM RENAL BILATERAL 8/15/2023 [facility]    OTHER SURGICAL HISTORY  11/22/2022    Lipoma excision        Family History   Problem Relation Name Age of Onset    Coronary artery disease Father      Hyperlipidemia Father      Coronary artery disease Father's Sister      Other (CABG) Father's Sister      Coronary artery disease Paternal Grandmother      Heart attack Paternal Grandmother      Coronary artery disease Paternal Grandfather      Heart attack Paternal Grandfather          Social  History     Socioeconomic History    Marital status:      Spouse name: Not on file    Number of children: Not on file    Years of education: Not on file    Highest education level: Not on file   Occupational History    Not on file   Tobacco Use    Smoking status: Never    Smokeless tobacco: Never   Vaping Use    Vaping Use: Never used   Substance and Sexual Activity    Alcohol use: Not Currently     Comment: rare    Drug use: Never    Sexual activity: Defer   Other Topics Concern    Not on file   Social History Narrative    Not on file     Social Determinants of Health     Financial Resource Strain: Not on file   Food Insecurity: Not on file   Transportation Needs: Not on file   Physical Activity: Not on file   Stress: Not on file   Social Connections: Not on file   Intimate Partner Violence: Not on file   Housing Stability: Not on file        Allergies   Allergen Reactions    Amlodipine Other     swelling in lower extremities         Review of Systems  Gen: No fatigue, anorexia, insomnia, fever.   Eyes: No vision loss, double vision, drainage, eye pain.   ENT: No pharyngitis, dry mouth, no hearing changes or ear discharge  Cardiac: No chest pain, palpitations, syncope, near syncope.   Pulmonary: No shortness of breath, cough, hemoptysis.   Heme/lymph: No swollen glands, fever, bleeding.   GI: No abdominal pain, change in bowel habits, melena, hematemesis, hematochezia, nausea, vomiting, diarrhea.   : No discharge, dysuria, frequency, urgency, hematuria.  Endo: No polyuria or weight loss.   Musculoskeletal: Negative for any pain or loss of ROM/weakness  Skin: No rashes or lesions  Neuro: Normal speech, no numbness or weakness. No gait difficulties  Review of systems is otherwise negative unless stated above or in history of present illness.    Physical Exam:  Constitutional:  no distress, alert and cooperative  Eyes: clear sclera  Head/Neck: No apparent injury, trachea midline  Respiratory/Thorax: Patent  airways, thorax symmetric, breathing comfortably  Cardiovascular: no pitting edema  Gastrointestinal: Nondistended  Musculoskeletal: ROM intact  Extremities: no clubbing  Neurological: alert, watson x4  Psychological: Appropriate affect    No results found for this or any previous visit (from the past 24 hour(s)).     Plan:    - b/l LAURA blocks performed preoperatively on 11/28  - Ambit ball with Ropivacaine 0.2%/NaCl 0.9% 500mL, Rate 7 cc/hr bilaterally  - Ambit medication will not interfere with pain medication prescribed by the primary team.   - Please be aware of local anesthetic toxic dose and absorption variability before considering lidocaine patches  - Acute pain service will follow while catheters in place  - Rest of pain management per primary team    Acute Pain Resident  pg [phone] ph [phone]

## 2023-11-28 NOTE — H&P
"Vascular Surgery History & Physical  Subjective   HPI:  Patrick Mckeon is 28 y.o. male with history of Alport Syndrome, hyperlipidemia, anxiety, bipolar disorder/depression, ADHD, connective tissue disorder, left renal artery aneurysm, renovascular hypertension who presents for repair of left renal artery aneurysm and right renal artery bypass    PMH: as above    PSH: as above    Relevant Home Meds: aspirin, lisinopril, rosuvastatin    Allergies: per chart review     ROS: 12 system negative except HPI    Objective   Vitals:  Heart Rate:  [91]   Temp:  [36.4 °C (97.5 °F)]   Resp:  [16]   BP: (175)/(107)   Height:  [177.8 cm (5' 10\")]   Weight:  [66.1 kg (145 lb 11.6 oz)]   SpO2:  [100 %]     Exam:  Constitutional: No acute distress, resting comfortably  Neuro:  AOx3, grossly intact  ENMT: moist mucous membranes  Head/neck: atraumatic  CV: no tachycardia  Pulm: non-labored on room air  GI: soft, non-tender, non-distended  Skin: warm and dry  Musculoskeletal: moving all extremities  Extremities: no edema    Labs:                 Assessment/Plan   Patrick Mckeon is 28 y.o. male with history of Alport Syndrome, hyperlipidemia, anxiety, bipolar disorder/depression, ADHD, connective tissue disorder, left renal artery aneurysm, renovascular hypertension who presents for repair of left renal artery aneurysm and right renal artery bypass    D/w attending, Dr. Leticia House MD  Vascular Surgery PGY-4  Team pager: [phone]   "

## 2023-11-28 NOTE — SIGNIFICANT EVENT
28year old with past medical history of bipolar disorder, renal artery stenosis and fibromuscular dysplasia. Presents to SICU sp right renal artery bypass and left renal artery aneurysm repair for close monitoring and BP control. Arrives extubated, on no drips.     Plan:  Neuro: dilaudid prn for pain control, okay for PO meds per vasc, neurovasc checks   CV: hx of HTN on home lisinopril now on hold, goal -150. PRN hydral and labetalol, low threshold to start drip if needed   Pulm: extubated on 2L NC, chest xray to verify line placement  : maintenance fluids, 1cc of NS for every 1cc of urine replacement for first 12hours per vasc, sp mannitol and lasix in the OR   GI: NPO, okay for PO meds   Heme: post op CBC and coags, okay for JULI tonight per surgery and asp  Endo: SSI  ID: periop antibiotics   Dispo: Continue ICU care for now     I have discussed the case with the resident/mid level provider. I have personally performed a history, physical exam, and my own medical decision making. I have reviewed the note and agree with the findings and plan with the following exceptions as identified below  I have personally provided 40 minutes of critical care time exclusive of time spent on separately billable procedures. Time includes review of laboratory data, radiology results, discussion with consultants, and monitoring for potential decompensation. Interventions were performed as documented below.    Sandi Aragon MD  Anesthesia and Critical Care staff

## 2023-11-29 ENCOUNTER — APPOINTMENT (OUTPATIENT)
Dept: RADIOLOGY | Facility: HOSPITAL | Age: 28
DRG: 660 | End: 2023-11-29
Payer: COMMERCIAL

## 2023-11-29 ENCOUNTER — DOCUMENTATION (OUTPATIENT)
Dept: PRIMARY CARE | Facility: CLINIC | Age: 28
End: 2023-11-29
Payer: COMMERCIAL

## 2023-11-29 DIAGNOSIS — F41.1 GAD (GENERALIZED ANXIETY DISORDER): Primary | ICD-10-CM

## 2023-11-29 LAB
ACT BLD: 137 SEC (ref 89–169)
ACT BLD: 149 SEC (ref 89–169)
ACT BLD: 264 SEC (ref 89–169)
ACT BLD: 317 SEC (ref 89–169)
ACT BLD: 320 SEC (ref 89–169)
ACT BLD: 344 SEC (ref 89–169)
ALBUMIN SERPL BCP-MCNC: 3.3 G/DL (ref 3.4–5)
ALBUMIN SERPL BCP-MCNC: 3.6 G/DL (ref 3.4–5)
ALBUMIN SERPL BCP-MCNC: 3.7 G/DL (ref 3.4–5)
ANION GAP BLDA CALCULATED.4IONS-SCNC: 7 MMO/L (ref 10–25)
ANION GAP SERPL CALC-SCNC: 11 MMOL/L (ref 10–20)
ANION GAP SERPL CALC-SCNC: 11 MMOL/L (ref 10–20)
ANION GAP SERPL CALC-SCNC: 12 MMOL/L (ref 10–20)
APTT PPP: 24 SECONDS (ref 27–38)
APTT PPP: 26 SECONDS (ref 27–38)
BASE EXCESS BLDA CALC-SCNC: 1.7 MMOL/L (ref -2–3)
BODY TEMPERATURE: 37 DEGREES CELSIUS
BUN SERPL-MCNC: 10 MG/DL (ref 6–23)
BUN SERPL-MCNC: 10 MG/DL (ref 6–23)
BUN SERPL-MCNC: 11 MG/DL (ref 6–23)
CA-I BLD-SCNC: 1.04 MMOL/L (ref 1.1–1.33)
CA-I BLD-SCNC: 1.11 MMOL/L (ref 1.1–1.33)
CA-I BLDA-SCNC: 1.07 MMOL/L (ref 1.1–1.33)
CALCIUM SERPL-MCNC: 7.8 MG/DL (ref 8.6–10.6)
CALCIUM SERPL-MCNC: 7.9 MG/DL (ref 8.6–10.6)
CALCIUM SERPL-MCNC: 8.2 MG/DL (ref 8.6–10.6)
CHLORIDE BLDA-SCNC: 101 MMOL/L (ref 98–107)
CHLORIDE SERPL-SCNC: 101 MMOL/L (ref 98–107)
CHLORIDE SERPL-SCNC: 102 MMOL/L (ref 98–107)
CHLORIDE SERPL-SCNC: 102 MMOL/L (ref 98–107)
CO2 SERPL-SCNC: 26 MMOL/L (ref 21–32)
CO2 SERPL-SCNC: 26 MMOL/L (ref 21–32)
CO2 SERPL-SCNC: 27 MMOL/L (ref 21–32)
CREAT SERPL-MCNC: 0.78 MG/DL (ref 0.5–1.3)
CREAT SERPL-MCNC: 0.91 MG/DL (ref 0.5–1.3)
CREAT SERPL-MCNC: 0.93 MG/DL (ref 0.5–1.3)
ERYTHROCYTE [DISTWIDTH] IN BLOOD BY AUTOMATED COUNT: 12.9 % (ref 11.5–14.5)
ERYTHROCYTE [DISTWIDTH] IN BLOOD BY AUTOMATED COUNT: 13.1 % (ref 11.5–14.5)
GFR SERPL CREATININE-BSD FRML MDRD: >90 ML/MIN/1.73M*2
GLUCOSE BLD MANUAL STRIP-MCNC: 108 MG/DL (ref 74–99)
GLUCOSE BLD MANUAL STRIP-MCNC: 111 MG/DL (ref 74–99)
GLUCOSE BLD MANUAL STRIP-MCNC: 118 MG/DL (ref 74–99)
GLUCOSE BLD MANUAL STRIP-MCNC: 123 MG/DL (ref 74–99)
GLUCOSE BLD MANUAL STRIP-MCNC: 137 MG/DL (ref 74–99)
GLUCOSE BLD MANUAL STRIP-MCNC: 97 MG/DL (ref 74–99)
GLUCOSE BLDA-MCNC: 127 MG/DL (ref 74–99)
GLUCOSE SERPL-MCNC: 106 MG/DL (ref 74–99)
GLUCOSE SERPL-MCNC: 121 MG/DL (ref 74–99)
GLUCOSE SERPL-MCNC: 128 MG/DL (ref 74–99)
HCO3 BLDA-SCNC: 26.6 MMOL/L (ref 22–26)
HCT VFR BLD AUTO: 33.7 % (ref 41–52)
HCT VFR BLD AUTO: 36.5 % (ref 41–52)
HCT VFR BLD EST: 35 % (ref 41–52)
HGB BLD-MCNC: 11.2 G/DL (ref 13.5–17.5)
HGB BLD-MCNC: 11.9 G/DL (ref 13.5–17.5)
HGB BLDA-MCNC: 11.7 G/DL (ref 13.5–17.5)
INR PPP: 1.2 (ref 0.9–1.1)
INR PPP: 1.3 (ref 0.9–1.1)
LACTATE BLDA-SCNC: 1.5 MMOL/L (ref 0.4–2)
MAGNESIUM SERPL-MCNC: 1.84 MG/DL (ref 1.6–2.4)
MAGNESIUM SERPL-MCNC: 1.92 MG/DL (ref 1.6–2.4)
MCH RBC QN AUTO: 28.7 PG (ref 26–34)
MCH RBC QN AUTO: 29.2 PG (ref 26–34)
MCHC RBC AUTO-ENTMCNC: 32.6 G/DL (ref 32–36)
MCHC RBC AUTO-ENTMCNC: 33.2 G/DL (ref 32–36)
MCV RBC AUTO: 86 FL (ref 80–100)
MCV RBC AUTO: 90 FL (ref 80–100)
NRBC BLD-RTO: 0 /100 WBCS (ref 0–0)
NRBC BLD-RTO: 0 /100 WBCS (ref 0–0)
OXYHGB MFR BLDA: 95.4 % (ref 94–98)
PCO2 BLDA: 42 MM HG (ref 38–42)
PH BLDA: 7.41 PH (ref 7.38–7.42)
PHOSPHATE SERPL-MCNC: 2.9 MG/DL (ref 2.5–4.9)
PHOSPHATE SERPL-MCNC: 3.2 MG/DL (ref 2.5–4.9)
PHOSPHATE SERPL-MCNC: 3.3 MG/DL (ref 2.5–4.9)
PLATELET # BLD AUTO: 185 X10*3/UL (ref 150–450)
PLATELET # BLD AUTO: 196 X10*3/UL (ref 150–450)
PO2 BLDA: 82 MM HG (ref 85–95)
POTASSIUM BLDA-SCNC: 3.9 MMOL/L (ref 3.5–5.3)
POTASSIUM SERPL-SCNC: 3.8 MMOL/L (ref 3.5–5.3)
POTASSIUM SERPL-SCNC: 3.9 MMOL/L (ref 3.5–5.3)
POTASSIUM SERPL-SCNC: 4.1 MMOL/L (ref 3.5–5.3)
PROTHROMBIN TIME: 13.7 SECONDS (ref 9.8–12.8)
PROTHROMBIN TIME: 14.3 SECONDS (ref 9.8–12.8)
RBC # BLD AUTO: 3.9 X10*6/UL (ref 4.5–5.9)
RBC # BLD AUTO: 4.08 X10*6/UL (ref 4.5–5.9)
SAO2 % BLDA: 97 % (ref 94–100)
SODIUM BLDA-SCNC: 131 MMOL/L (ref 136–145)
SODIUM SERPL-SCNC: 134 MMOL/L (ref 136–145)
SODIUM SERPL-SCNC: 136 MMOL/L (ref 136–145)
SODIUM SERPL-SCNC: 136 MMOL/L (ref 136–145)
WBC # BLD AUTO: 10.7 X10*3/UL (ref 4.4–11.3)
WBC # BLD AUTO: 12 X10*3/UL (ref 4.4–11.3)

## 2023-11-29 PROCEDURE — 82947 ASSAY GLUCOSE BLOOD QUANT: CPT

## 2023-11-29 PROCEDURE — 82435 ASSAY OF BLOOD CHLORIDE: CPT | Performed by: PHYSICIAN ASSISTANT

## 2023-11-29 PROCEDURE — 85610 PROTHROMBIN TIME: CPT | Performed by: PHYSICIAN ASSISTANT

## 2023-11-29 PROCEDURE — 2500000004 HC RX 250 GENERAL PHARMACY W/ HCPCS (ALT 636 FOR OP/ED): Performed by: STUDENT IN AN ORGANIZED HEALTH CARE EDUCATION/TRAINING PROGRAM

## 2023-11-29 PROCEDURE — 85027 COMPLETE CBC AUTOMATED: CPT | Performed by: PHYSICIAN ASSISTANT

## 2023-11-29 PROCEDURE — 96372 THER/PROPH/DIAG INJ SC/IM: CPT | Performed by: PHYSICIAN ASSISTANT

## 2023-11-29 PROCEDURE — 99492 1ST PSYC COLLAB CARE MGMT: CPT | Performed by: INTERNAL MEDICINE

## 2023-11-29 PROCEDURE — 84132 ASSAY OF SERUM POTASSIUM: CPT

## 2023-11-29 PROCEDURE — 71045 X-RAY EXAM CHEST 1 VIEW: CPT

## 2023-11-29 PROCEDURE — 2500000004 HC RX 250 GENERAL PHARMACY W/ HCPCS (ALT 636 FOR OP/ED): Performed by: PHYSICIAN ASSISTANT

## 2023-11-29 PROCEDURE — 84295 ASSAY OF SERUM SODIUM: CPT

## 2023-11-29 PROCEDURE — 2500000001 HC RX 250 WO HCPCS SELF ADMINISTERED DRUGS (ALT 637 FOR MEDICARE OP): Performed by: STUDENT IN AN ORGANIZED HEALTH CARE EDUCATION/TRAINING PROGRAM

## 2023-11-29 PROCEDURE — 83605 ASSAY OF LACTIC ACID: CPT

## 2023-11-29 PROCEDURE — 1200000002 HC GENERAL ROOM WITH TELEMETRY DAILY

## 2023-11-29 PROCEDURE — 83735 ASSAY OF MAGNESIUM: CPT | Performed by: PHYSICIAN ASSISTANT

## 2023-11-29 PROCEDURE — 99494 1ST/SBSQ PSYC COLLAB CARE: CPT | Performed by: INTERNAL MEDICINE

## 2023-11-29 PROCEDURE — 99232 SBSQ HOSP IP/OBS MODERATE 35: CPT | Performed by: PHYSICIAN ASSISTANT

## 2023-11-29 PROCEDURE — 99231 SBSQ HOSP IP/OBS SF/LOW 25: CPT | Performed by: ANESTHESIOLOGY

## 2023-11-29 PROCEDURE — 84295 ASSAY OF SERUM SODIUM: CPT | Performed by: PHYSICIAN ASSISTANT

## 2023-11-29 PROCEDURE — 99291 CRITICAL CARE FIRST HOUR: CPT | Performed by: STUDENT IN AN ORGANIZED HEALTH CARE EDUCATION/TRAINING PROGRAM

## 2023-11-29 PROCEDURE — 82330 ASSAY OF CALCIUM: CPT | Performed by: PHYSICIAN ASSISTANT

## 2023-11-29 PROCEDURE — 71045 X-RAY EXAM CHEST 1 VIEW: CPT | Performed by: RADIOLOGY

## 2023-11-29 PROCEDURE — 97163 PT EVAL HIGH COMPLEX 45 MIN: CPT | Mod: GP | Performed by: PHYSICAL THERAPIST

## 2023-11-29 PROCEDURE — 37799 UNLISTED PX VASCULAR SURGERY: CPT | Performed by: PHYSICIAN ASSISTANT

## 2023-11-29 PROCEDURE — 2500000001 HC RX 250 WO HCPCS SELF ADMINISTERED DRUGS (ALT 637 FOR MEDICARE OP): Performed by: PHYSICIAN ASSISTANT

## 2023-11-29 RX ORDER — NALOXONE HYDROCHLORIDE 0.4 MG/ML
0.2 INJECTION, SOLUTION INTRAMUSCULAR; INTRAVENOUS; SUBCUTANEOUS AS NEEDED
Status: DISCONTINUED | OUTPATIENT
Start: 2023-11-29 | End: 2023-12-01

## 2023-11-29 RX ORDER — METHOCARBAMOL 100 MG/ML
1000 INJECTION, SOLUTION INTRAMUSCULAR; INTRAVENOUS EVERY 6 HOURS PRN
Status: DISCONTINUED | OUTPATIENT
Start: 2023-11-29 | End: 2023-11-29

## 2023-11-29 RX ORDER — HYDROXYZINE HYDROCHLORIDE 25 MG/1
25 TABLET, FILM COATED ORAL ONCE
Status: COMPLETED | OUTPATIENT
Start: 2023-11-29 | End: 2023-11-29

## 2023-11-29 RX ORDER — HYDROMORPHONE HYDROCHLORIDE 1 MG/ML
0.4 INJECTION, SOLUTION INTRAMUSCULAR; INTRAVENOUS; SUBCUTANEOUS ONCE
Status: COMPLETED | OUTPATIENT
Start: 2023-11-29 | End: 2023-11-29

## 2023-11-29 RX ORDER — DIAZEPAM 5 MG/ML
2 INJECTION, SOLUTION INTRAMUSCULAR; INTRAVENOUS EVERY 8 HOURS PRN
Status: DISCONTINUED | OUTPATIENT
Start: 2023-11-29 | End: 2023-11-30

## 2023-11-29 RX ORDER — HYDROXYZINE HYDROCHLORIDE 25 MG/1
25 TABLET, FILM COATED ORAL DAILY
Status: DISCONTINUED | OUTPATIENT
Start: 2023-11-29 | End: 2023-12-05 | Stop reason: HOSPADM

## 2023-11-29 RX ORDER — OXYCODONE HYDROCHLORIDE 5 MG/1
10 TABLET ORAL EVERY 6 HOURS PRN
Status: DISCONTINUED | OUTPATIENT
Start: 2023-11-29 | End: 2023-11-29

## 2023-11-29 RX ORDER — INSULIN LISPRO 100 [IU]/ML
0-10 INJECTION, SOLUTION INTRAVENOUS; SUBCUTANEOUS EVERY 4 HOURS
Status: DISCONTINUED | OUTPATIENT
Start: 2023-11-29 | End: 2023-11-30

## 2023-11-29 RX ORDER — ACETAMINOPHEN 325 MG/1
650 TABLET ORAL EVERY 6 HOURS PRN
Status: DISCONTINUED | OUTPATIENT
Start: 2023-11-29 | End: 2023-11-29

## 2023-11-29 RX ORDER — FLUTICASONE PROPIONATE 50 MCG
2 SPRAY, SUSPENSION (ML) NASAL DAILY
Status: DISCONTINUED | OUTPATIENT
Start: 2023-11-29 | End: 2023-12-05 | Stop reason: HOSPADM

## 2023-11-29 RX ORDER — ACETAMINOPHEN 325 MG/1
975 TABLET ORAL EVERY 6 HOURS
Status: DISCONTINUED | OUTPATIENT
Start: 2023-11-29 | End: 2023-11-30

## 2023-11-29 RX ORDER — HYDROMORPHONE HCL/0.9% NACL/PF 15 MG/30ML
PATIENT CONTROLLED ANALGESIA SYRINGE INTRAVENOUS CONTINUOUS
Status: DISCONTINUED | OUTPATIENT
Start: 2023-11-29 | End: 2023-12-01

## 2023-11-29 RX ORDER — ACETAMINOPHEN 10 MG/ML
1000 INJECTION, SOLUTION INTRAVENOUS EVERY 6 HOURS SCHEDULED
Status: DISCONTINUED | OUTPATIENT
Start: 2023-11-29 | End: 2023-11-29

## 2023-11-29 RX ADMIN — HYDROXYZINE HYDROCHLORIDE 25 MG: 25 TABLET, FILM COATED ORAL at 01:53

## 2023-11-29 RX ADMIN — ACETAMINOPHEN 1000 MG: 10 INJECTION INTRAVENOUS at 08:04

## 2023-11-29 RX ADMIN — Medication: at 06:54

## 2023-11-29 RX ADMIN — DIAZEPAM 2 MG: 5 INJECTION, SOLUTION INTRAMUSCULAR; INTRAVENOUS at 18:01

## 2023-11-29 RX ADMIN — OXYCODONE HYDROCHLORIDE 10 MG: 5 TABLET ORAL at 05:33

## 2023-11-29 RX ADMIN — MAGNESIUM SULFATE HEPTAHYDRATE 2 G: 40 INJECTION, SOLUTION INTRAVENOUS at 13:51

## 2023-11-29 RX ADMIN — METHOCARBAMOL 1000 MG: 1000 INJECTION, SOLUTION INTRAMUSCULAR; INTRAVENOUS at 08:06

## 2023-11-29 RX ADMIN — HYDROMORPHONE HYDROCHLORIDE 0.4 MG: 1 INJECTION, SOLUTION INTRAMUSCULAR; INTRAVENOUS; SUBCUTANEOUS at 02:40

## 2023-11-29 RX ADMIN — HEPARIN SODIUM 5000 UNITS: 5000 INJECTION INTRAVENOUS; SUBCUTANEOUS at 05:33

## 2023-11-29 RX ADMIN — ACETAMINOPHEN 975 MG: 325 TABLET ORAL at 22:18

## 2023-11-29 RX ADMIN — ASPIRIN 81 MG CHEWABLE TABLET 81 MG: 81 TABLET CHEWABLE at 08:06

## 2023-11-29 RX ADMIN — ONDANSETRON 4 MG: 2 INJECTION INTRAMUSCULAR; INTRAVENOUS at 22:18

## 2023-11-29 RX ADMIN — CEFAZOLIN SODIUM 2 G: 2 INJECTION, SOLUTION INTRAVENOUS at 04:02

## 2023-11-29 RX ADMIN — CALCIUM GLUCONATE 1 G: 20 INJECTION, SOLUTION INTRAVENOUS at 08:19

## 2023-11-29 RX ADMIN — ACETAMINOPHEN 1000 MG: 10 INJECTION INTRAVENOUS at 11:42

## 2023-11-29 RX ADMIN — HYDROMORPHONE HYDROCHLORIDE 0.4 MG: 1 INJECTION, SOLUTION INTRAMUSCULAR; INTRAVENOUS; SUBCUTANEOUS at 06:35

## 2023-11-29 RX ADMIN — HEPARIN SODIUM 5000 UNITS: 5000 INJECTION INTRAVENOUS; SUBCUTANEOUS at 20:26

## 2023-11-29 RX ADMIN — ONDANSETRON 4 MG: 2 INJECTION INTRAMUSCULAR; INTRAVENOUS at 11:42

## 2023-11-29 RX ADMIN — HEPARIN SODIUM 5000 UNITS: 5000 INJECTION INTRAVENOUS; SUBCUTANEOUS at 13:51

## 2023-11-29 ASSESSMENT — PAIN SCALES - GENERAL
PAINLEVEL_OUTOF10: 2
PAINLEVEL_OUTOF10: 3
PAINLEVEL_OUTOF10: 8
PAINLEVEL_OUTOF10: 2
PAINLEVEL_OUTOF10: 3
PAINLEVEL_OUTOF10: 6
PAINLEVEL_OUTOF10: 3
PAINLEVEL_OUTOF10: 10 - WORST POSSIBLE PAIN
PAINLEVEL_OUTOF10: 5 - MODERATE PAIN
PAINLEVEL_OUTOF10: 0 - NO PAIN
PAINLEVEL_OUTOF10: 2
PAINLEVEL_OUTOF10: 3
PAINLEVEL_OUTOF10: 10 - WORST POSSIBLE PAIN
PAINLEVEL_OUTOF10: 10 - WORST POSSIBLE PAIN
PAINLEVEL_OUTOF10: 3

## 2023-11-29 ASSESSMENT — COGNITIVE AND FUNCTIONAL STATUS - GENERAL
CLIMB 3 TO 5 STEPS WITH RAILING: A LOT
PERSONAL GROOMING: A LITTLE
CLIMB 3 TO 5 STEPS WITH RAILING: A LITTLE
MOVING TO AND FROM BED TO CHAIR: A LITTLE
MOVING FROM LYING ON BACK TO SITTING ON SIDE OF FLAT BED WITH BEDRAILS: A LITTLE
EATING MEALS: A LITTLE
STANDING UP FROM CHAIR USING ARMS: A LITTLE
STANDING UP FROM CHAIR USING ARMS: A LITTLE
TURNING FROM BACK TO SIDE WHILE IN FLAT BAD: A LITTLE
DRESSING REGULAR LOWER BODY CLOTHING: A LITTLE
WALKING IN HOSPITAL ROOM: A LITTLE
MOBILITY SCORE: 20
MOVING FROM LYING ON BACK TO SITTING ON SIDE OF FLAT BED WITH BEDRAILS: A LITTLE
MOBILITY SCORE: 17
DAILY ACTIVITIY SCORE: 24
TURNING FROM BACK TO SIDE WHILE IN FLAT BAD: A LITTLE
WALKING IN HOSPITAL ROOM: A LITTLE
DRESSING REGULAR UPPER BODY CLOTHING: A LITTLE
DAILY ACTIVITIY SCORE: 18
MOBILITY SCORE: 18
CLIMB 3 TO 5 STEPS WITH RAILING: A LOT
WALKING IN HOSPITAL ROOM: A LITTLE
STANDING UP FROM CHAIR USING ARMS: A LITTLE
HELP NEEDED FOR BATHING: A LITTLE
TOILETING: A LITTLE
MOVING TO AND FROM BED TO CHAIR: A LITTLE

## 2023-11-29 ASSESSMENT — PAIN - FUNCTIONAL ASSESSMENT

## 2023-11-29 ASSESSMENT — PAIN DESCRIPTION - DESCRIPTORS
DESCRIPTORS: SHARP
DESCRIPTORS: STABBING
DESCRIPTORS: ACHING;DISCOMFORT;DULL
DESCRIPTORS: SHARP
DESCRIPTORS: ACHING;DISCOMFORT

## 2023-11-29 NOTE — PROGRESS NOTES
PLAN: 28 y.o. male to SICU   Left renal artery aneurysm resection and Right renal artery bypass  on 11/28/2023 with  for management and close obs.      PAYOR: Yonny Commercial     DISPO: TBD- likely home vs home with hhc    SUPPORT/CONTACT: Gretta (edy) [phone]  Met with patient and spouse to confirm demographics. IPTA, active with PCP, verbalized prescription coverage with no barriers to post acute care. Anticipate home with outpatient f/up vs HHC. Available to assist as needed.

## 2023-11-29 NOTE — PROGRESS NOTES
Physical Therapy    Physical Therapy Evaluation    Patient Name: Patrick Mckeon  MRN: [id number]  Today's Date: 11/29/2023   Start Time: 1114  Stop Time: 1145       Assessment/Plan   PT Assessment  PT Assessment Results: Impaired balance, Decreased mobility  Rehab Prognosis: Excellent  Medical Staff Made Aware: Yes  End of Session Communication: Bedside nurse  Assessment Comment: Pt is a 28 y.o male who presents to PT with decreased mobility and balance.  He c/o dizziness throughout session and sitting in chair at end of session.  BP WFL throughout session and RN in room aware of dizziness at end of session.  Pt will benefit from skilled PT while admitted to return to baseline level of function and anticipate no PT needs upon discharge.  End of Session Patient Position: Up in chair, Alarm on  IP OR SWING BED PT PLAN  Inpatient or Swing Bed: Inpatient  PT Plan  Treatment/Interventions: Bed mobility, Transfer training, Gait training, Balance training, Therapeutic exercise, Therapeutic activity, Home exercise program  PT Plan: Skilled PT  PT Frequency: 3 times per week  PT Discharge Recommendations:  (Anticipate no PT needed after discharge)  Equipment Recommended upon Discharge:  (will continue to assess)  PT Recommended Transfer Status: Assist x1  PT - OK to Discharge: Yes (eval completed and discharge recommendations made)      Subjective   General Visit Information:  Reason for Referral: s/p L renal artery anuerysm repair, R renal artery bypass  Past Medical History Relevant to Rehab: Per chart, PMH includes Alport Syndrome,  middle aortic syndrome, left renal artery aneurysm, renovascular hypertension/SULAIMAN, renal artery stenosis, anxiety, hyperlipidemia, GERD and hypertension.  Prior to Session Communication: Bedside nurse  Patient Position Received: Bed, 3 rail up, Alarm off, not on at start of session  Family/Caregiver Present: Yes (Wife)  General Comment: Pt cleared for PT by RN.  Pt alert and agreeable to  PT.   Home Living:  Home Living  Type of Home: Apartment (4th floor with elevator)  Lives With: Spouse  Home Adaptive Equipment: None  Home Layout: One level  Home Access: Elevator  Bathroom Shower/Tub: Tub/shower unit  Prior Level of Function:  Prior Function Per Pt/Caregiver Report  Level of [place]: Independent with ADLs and functional transfers, Independent with homemaking with ambulation  Precautions:  Precautions  Medical Precautions: Fall precautions, Abdominal precautions  Post-Surgical Precautions: Abdominal surgery precautions  Vital Signs:  Vital Signs  Heart Rate:  (pre:98, durin, post:81)  Resp:  (pre:18, post:9)  SpO2:  (pre:100%, post:95%)  BP:  (pre:119/68, sittin/91, standin/92, sitting c/o dizziness:123/81, post:121/78)  MAP (mmHg):  (pre:83, sittin, standin, sittin, post:89)  BP Location: Left arm  BP Method: Automatic    Objective   Lines/Tubes/Drains:  CVC 23 Double lumen Right Internal jugular (Active)   Number of days: 1       Urethral Catheter Temperature probe 14 Fr. (Active)   Number of days: 1     Continuous Medications/Drips:  HYDROmorphone,   lactated Ringer's, 75 mL/hr, Last Rate: 75 mL/hr (23 0744)      Pain:  Pain Assessment  Pain Assessment: 0-10  Pain Score: 3  Pain Type: Surgical pain  Pain Location: Abdomen  Cognition:  Cognition  Overall Cognitive Status: Within Functional Limits    Extremity/Trunk Assessments:  Strength:  Strength Comments: B LE strength grossly 5/5     General Assessments:  Strength  Strength Comments: B LE strength grossly 5/5      Activity Tolerance  Early Mobility/Exercise Safety Screen: Proceed with mobilization - No exclusion criteria met        Static Sitting Balance  Static Sitting-Balance Support: Bilateral upper extremity supported, Feet supported  Static Sitting-Level of Assistance: Close supervision  Dynamic Sitting Balance  Dynamic Sitting-Balance Support: Bilateral upper extremity supported, Feet  supported  Dynamic Sitting-Balance: Lateral lean  Dynamic Sitting-Comments: close supervision  Static Standing Balance  Static Standing-Balance Support: Bilateral upper extremity supported (2 HHA)  Static Standing-Level of Assistance: Contact guard  Dynamic Standing Balance  Dynamic Standing-Balance Support: Bilateral upper extremity supported (2 HHA)  Dynamic Standing-Balance: Turning  Dynamic Standing-Comments: CGA    Functional Assessments:  Bed Mobility  Bed Mobility: Yes  Bed Mobility 1  Bed Mobility 1: Log roll  Level of Assistance 1: Minimum assistance  Bed Mobility 2  Bed Mobility  2: Side lying left to sit  Level of Assistance 2: Minimum assistance  Transfers  Transfer: Yes  Transfer 1  Transfer From 1: Bed to  Transfer to 1: Stand  Technique 1: Sit to stand, Stand to sit  Transfer Level of Assistance 1: Arm in arm assistance, Minimum assistance  Ambulation/Gait Training  Ambulation/Gait Training Performed: Yes  Ambulation/Gait Training 1  Surface 1: Level tile  Device 1: No device  Assistance 1: Arm in arm assistance, Contact guard  Quality of Gait 1: Decreased step length  Comments/Distance (ft) 1: side stepping 2ft to L to sit in chair     Outcome Measures:  [facility] Basic Mobility  Turning from your back to your side while in a flat bed without using bedrails: A little  Moving from lying on your back to sitting on the side of a flat bed without using bedrails: A little  Moving to and from bed to chair (including a wheelchair): A little  Standing up from a chair using your arms (e.g. wheelchair or bedside chair): A little  To walk in hospital room: A little  Climbing 3-5 steps with railing: A little  Basic Mobility - Total Score: 18     FSS-ICU  Ambulation: Walks <50 feet with any assistance x1 or walks any distance with assistance x2 people  Rolling: Minimal assistance (performs 75% or more of task)  Sitting: Supervision or set-up only  Transfer Sit-to-Stand: Minimal assistance (performs 75% or more of  task)  Transfer Supine-to-Sit: Minimal assistance (performs 75% or more of task)  Total Score: 18    ICU Mobility Screen  Early Mobility/Exercise Safety Screen: Proceed with mobilization - No exclusion criteria met                Encounter Problems       Encounter Problems (Active)       Balance       Pt will score >/=24 on the Tinetti to indicate low fall risk  (Progressing)       Start:  11/29/23    Expected End:  12/13/23               Mobility       Pt will complete bed mobility independently  (Progressing)       Start:  11/29/23    Expected End:  12/13/23            Pt will amb >250ft with LRAD and SBA in prep for safe discharge home  (Progressing)       Start:  11/29/23    Expected End:  12/13/23                   Education Documentation  Mobility Training, taught by Sally Cummins PT at 11/29/2023 12:08 PM.  Learner: Patient  Readiness: Acceptance  Method: Explanation  Response: Verbalizes Understanding    Precautions, taught by Sally Cummins PT at 11/29/2023 12:08 PM.  Learner: Patient  Readiness: Acceptance  Method: Explanation  Response: Verbalizes Understanding  Comment: abdominal precautions    Education Comments  No comments found.            11/29/23 at 12:24 PM   SALLY CUMMINS PT   Rehab Office: [phone]

## 2023-11-29 NOTE — PROGRESS NOTES
Acute Pain Service    Postop Pain HPI -   Palliative: relieved with IV analgesics and regional local anesthetics  Provocative: movement  Quality:  burning and aching  Radiation:  none  Severity:  0/10, 10/10 overnight  Timing: constant    24-HOUR OPIOID CONSUMPTION:  0.8mg dilaudid, 10mg oxycodone    Scheduled medications  acetaminophen, 1,000 mg, intravenous, q6h JULI  aspirin, 81 mg, oral, Daily  fluticasone, 2 spray, Each Nostril, Daily  heparin, 5,000 Units, subcutaneous, q8h  hydrOXYzine HCL, 25 mg, oral, Daily  insulin lispro, 0-10 Units, subcutaneous, q4h      Continuous medications  HYDROmorphone,   lactated Ringer's, 75 mL/hr, Last Rate: 75 mL/hr (11/29/23 0744)      PRN medications  PRN medications: calcium gluconate, calcium gluconate, dextrose 10 % in water (D10W), dextrose, diazePAM, glucagon, labetaloL, magnesium sulfate, magnesium sulfate, naloxone, ondansetron, potassium chloride     Physical Exam:  Constitutional:  no distress, alert and cooperative  Eyes: clear sclera  Head/Neck: No apparent injury, trachea midline  Respiratory/Thorax: Patent airways, thorax symmetric, breathing comfortably  Cardiovascular: no pitting edema  Gastrointestinal: Nondistended  Musculoskeletal: ROM intact  Extremities: no clubbing  Neurological: alert, watson x4  Psychological: Appropriate affect    Results for orders placed or performed during the hospital encounter of 11/28/23 (from the past 24 hour(s))   Type and screen   Result Value Ref Range    ABO TYPE AB     Rh TYPE POS     ANTIBODY SCREEN NEG    CBC   Result Value Ref Range    WBC 12.7 (H) 4.4 - 11.3 x10*3/uL    nRBC 0.0 0.0 - 0.0 /100 WBCs    RBC 4.37 (L) 4.50 - 5.90 x10*6/uL    Hemoglobin 12.6 (L) 13.5 - 17.5 g/dL    Hematocrit 38.1 (L) 41.0 - 52.0 %    MCV 87 80 - 100 fL    MCH 28.8 26.0 - 34.0 pg    MCHC 33.1 32.0 - 36.0 g/dL    RDW 12.6 11.5 - 14.5 %    Platelets 237 150 - 450 x10*3/uL   Renal Function Panel   Result Value Ref Range    Glucose 149 (H) 74 - 99  mg/dL    Sodium 140 136 - 145 mmol/L    Potassium 4.1 3.5 - 5.3 mmol/L    Chloride 105 98 - 107 mmol/L    Bicarbonate 25 21 - 32 mmol/L    Anion Gap 14 10 - 20 mmol/L    Urea Nitrogen 9 6 - 23 mg/dL    Creatinine 0.74 0.50 - 1.30 mg/dL    eGFR >90 >60 mL/min/1.73m*2    Calcium 8.9 8.6 - 10.6 mg/dL    Phosphorus 4.4 2.5 - 4.9 mg/dL    Albumin 4.2 3.4 - 5.0 g/dL   Magnesium   Result Value Ref Range    Magnesium 1.40 (L) 1.60 - 2.40 mg/dL   Coagulation Screen   Result Value Ref Range    Protime 12.9 (H) 9.8 - 12.8 seconds    INR 1.1 0.9 - 1.1    aPTT 25 (L) 27 - 38 seconds   Blood Gas Arterial Full Panel   Result Value Ref Range    POCT pH, Arterial 7.36 (L) 7.38 - 7.42 pH    POCT pCO2, Arterial 47 (H) 38 - 42 mm Hg    POCT pO2, Arterial 88 85 - 95 mm Hg    POCT SO2, Arterial 97 94 - 100 %    POCT Oxy Hemoglobin, Arterial 95.4 94.0 - 98.0 %    POCT Hematocrit Calculated, Arterial 38.0 (L) 41.0 - 52.0 %    POCT Sodium, Arterial 137 136 - 145 mmol/L    POCT Potassium, Arterial 4.2 3.5 - 5.3 mmol/L    POCT Chloride, Arterial 104 98 - 107 mmol/L    POCT Ionized Calcium, Arterial 1.18 1.10 - 1.33 mmol/L    POCT Glucose, Arterial 153 (H) 74 - 99 mg/dL    POCT Lactate, Arterial 2.3 (H) 0.4 - 2.0 mmol/L    POCT Base Excess, Arterial 0.6 -2.0 - 3.0 mmol/L    POCT HCO3 Calculated, Arterial 26.6 (H) 22.0 - 26.0 mmol/L    POCT Hemoglobin, Arterial 12.8 (L) 13.5 - 17.5 g/dL    POCT Anion Gap, Arterial 11 10 - 25 mmo/L    Patient Temperature 37.0 degrees Celsius    FiO2 21 %   POCT GLUCOSE   Result Value Ref Range    POCT Glucose 144 (H) 74 - 99 mg/dL   POCT GLUCOSE   Result Value Ref Range    POCT Glucose 143 (H) 74 - 99 mg/dL   POCT GLUCOSE   Result Value Ref Range    POCT Glucose 137 (H) 74 - 99 mg/dL   Blood Gas Arterial Full Panel Unsolicited   Result Value Ref Range    POCT pH, Arterial 7.41 7.38 - 7.42 pH    POCT pCO2, Arterial 42 38 - 42 mm Hg    POCT pO2, Arterial 82 (L) 85 - 95 mm Hg    POCT SO2, Arterial 97 94 - 100 %     POCT Oxy Hemoglobin, Arterial 95.4 94.0 - 98.0 %    POCT Hematocrit Calculated, Arterial 35.0 (L) 41.0 - 52.0 %    POCT Sodium, Arterial 131 (L) 136 - 145 mmol/L    POCT Potassium, Arterial 3.9 3.5 - 5.3 mmol/L    POCT Chloride, Arterial 101 98 - 107 mmol/L    POCT Ionized Calcium, Arterial 1.07 (L) 1.10 - 1.33 mmol/L    POCT Glucose, Arterial 127 (H) 74 - 99 mg/dL    POCT Lactate, Arterial 1.5 0.4 - 2.0 mmol/L    POCT Base Excess, Arterial 1.7 -2.0 - 3.0 mmol/L    POCT HCO3 Calculated, Arterial 26.6 (H) 22.0 - 26.0 mmol/L    POCT Hemoglobin, Arterial 11.7 (L) 13.5 - 17.5 g/dL    POCT Anion Gap, Arterial 7 (L) 10 - 25 mmo/L    Patient Temperature 37.0 degrees Celsius   Renal Function Panel   Result Value Ref Range    Glucose 121 (H) 74 - 99 mg/dL    Sodium 136 136 - 145 mmol/L    Potassium 3.9 3.5 - 5.3 mmol/L    Chloride 102 98 - 107 mmol/L    Bicarbonate 26 21 - 32 mmol/L    Anion Gap 12 10 - 20 mmol/L    Urea Nitrogen 10 6 - 23 mg/dL    Creatinine 0.78 0.50 - 1.30 mg/dL    eGFR >90 >60 mL/min/1.73m*2    Calcium 7.9 (L) 8.6 - 10.6 mg/dL    Phosphorus 3.2 2.5 - 4.9 mg/dL    Albumin 3.6 3.4 - 5.0 g/dL   CBC   Result Value Ref Range    WBC 12.0 (H) 4.4 - 11.3 x10*3/uL    nRBC 0.0 0.0 - 0.0 /100 WBCs    RBC 3.90 (L) 4.50 - 5.90 x10*6/uL    Hemoglobin 11.2 (L) 13.5 - 17.5 g/dL    Hematocrit 33.7 (L) 41.0 - 52.0 %    MCV 86 80 - 100 fL    MCH 28.7 26.0 - 34.0 pg    MCHC 33.2 32.0 - 36.0 g/dL    RDW 12.9 11.5 - 14.5 %    Platelets 196 150 - 450 x10*3/uL   Coagulation Screen   Result Value Ref Range    Protime 13.7 (H) 9.8 - 12.8 seconds    INR 1.2 (H) 0.9 - 1.1    aPTT 26 (L) 27 - 38 seconds   Calcium, Ionized   Result Value Ref Range    POCT Calcium, Ionized 1.04 (L) 1.1 - 1.33 mmol/L   Renal Function Panel   Result Value Ref Range    Glucose 128 (H) 74 - 99 mg/dL    Sodium 134 (L) 136 - 145 mmol/L    Potassium 3.8 3.5 - 5.3 mmol/L    Chloride 101 98 - 107 mmol/L    Bicarbonate 26 21 - 32 mmol/L    Anion Gap 11 10 - 20  mmol/L    Urea Nitrogen 10 6 - 23 mg/dL    Creatinine 0.91 0.50 - 1.30 mg/dL    eGFR >90 >60 mL/min/1.73m*2    Calcium 7.8 (L) 8.6 - 10.6 mg/dL    Phosphorus 3.3 2.5 - 4.9 mg/dL    Albumin 3.7 3.4 - 5.0 g/dL   Magnesium   Result Value Ref Range    Magnesium 1.92 1.60 - 2.40 mg/dL   POCT GLUCOSE   Result Value Ref Range    POCT Glucose 123 (H) 74 - 99 mg/dL   POCT GLUCOSE   Result Value Ref Range    POCT Glucose 118 (H) 74 - 99 mg/dL   Renal Function Panel   Result Value Ref Range    Glucose 106 (H) 74 - 99 mg/dL    Sodium 136 136 - 145 mmol/L    Potassium 4.1 3.5 - 5.3 mmol/L    Chloride 102 98 - 107 mmol/L    Bicarbonate 27 21 - 32 mmol/L    Anion Gap 11 10 - 20 mmol/L    Urea Nitrogen 11 6 - 23 mg/dL    Creatinine 0.93 0.50 - 1.30 mg/dL    eGFR >90 >60 mL/min/1.73m*2    Calcium 8.2 (L) 8.6 - 10.6 mg/dL    Phosphorus 2.9 2.5 - 4.9 mg/dL    Albumin 3.3 (L) 3.4 - 5.0 g/dL   Magnesium   Result Value Ref Range    Magnesium 1.84 1.60 - 2.40 mg/dL   Coagulation Screen   Result Value Ref Range    Protime 14.3 (H) 9.8 - 12.8 seconds    INR 1.3 (H) 0.9 - 1.1    aPTT 24 (L) 27 - 38 seconds   CBC   Result Value Ref Range    WBC 10.7 4.4 - 11.3 x10*3/uL    nRBC 0.0 0.0 - 0.0 /100 WBCs    RBC 4.08 (L) 4.50 - 5.90 x10*6/uL    Hemoglobin 11.9 (L) 13.5 - 17.5 g/dL    Hematocrit 36.5 (L) 41.0 - 52.0 %    MCV 90 80 - 100 fL    MCH 29.2 26.0 - 34.0 pg    MCHC 32.6 32.0 - 36.0 g/dL    RDW 13.1 11.5 - 14.5 %    Platelets 185 150 - 450 x10*3/uL   Calcium, Ionized   Result Value Ref Range    POCT Calcium, Ionized 1.11 1.1 - 1.33 mmol/L      PLAN  - Bilateral LAURA nerve blocks with catheters placed preoperatively on 11/28, refill 11/29.  - Ambit ball with Ropivacaine 0.2%/NaCl 0.9% 500mL, Rate 7cc/hr  - Ambit medication will not interfere with pain medication prescribed by the primary team.   - Please be aware of local anesthetic toxic dose and absorption variability before considering lidocaine patches  - IV Mg 1g Q8H x 3 doses, target   level 2.25  - IV Toradol 30mg Q6H for 5 days max per surgical service  - Tylenol 975mg Q8H, time 3 hours between tylenol and toradol  - PO Robaxin 1000mg QID  - Gabapentin 300mg at bedtime  - Oxycodone 5/10mg Q4H for mod/severe pain  - Acute pain service will follow while catheters in place  - Rest of pain management per primary team     Acute Pain Resident  pg [phone] ph [phone]

## 2023-11-29 NOTE — PROGRESS NOTES
"                               Surgical Intensive Care Unit Progress Note       Patrick Mckeon is a 28 y.o. male on day 1 of admission presenting with Renal artery stenosis (CMS/HCC).    Subjective   Started on dilaudid PCA and IV tylenol overnight for increased pain    Objective     Physical Exam  Constitutional:       Appearance: Normal appearance.   HENT:      Head: Normocephalic and atraumatic.   Cardiovascular:      Rate and Rhythm: Normal rate and regular rhythm.   Pulmonary:      Effort: Pulmonary effort is normal.      Breath sounds: Normal breath sounds.   Genitourinary:     Comments: Borja draining clear urine   Skin:     General: Skin is warm.   Neurological:      General: No focal deficit present.      Mental Status: He is alert and oriented to person, place, and time.   Psychiatric:         Mood and Affect: Mood normal.         Behavior: Behavior normal.         Last Recorded Vitals  Blood pressure 128/79, pulse 90, temperature 37.1 °C (98.7 °F), temperature source Temporal, resp. rate 14, height 1.778 m (5' 10\"), weight 75.6 kg (166 lb 10.7 oz), SpO2 95 %.  Intake/Output last 3 Shifts:  I/O last 3 completed shifts:  In: 5504.6 (72.8 mL/kg) [I.V.:4889.6 (64.7 mL/kg); Blood:490; IV Piggyback:125]  Out: 3635 (48.1 mL/kg) [Urine:3235 (1.2 mL/kg/hr); Blood:400]  Weight: 75.6 kg     Relevant Results  Scheduled medications  acetaminophen, 1,000 mg, intravenous, q6h JULI  aspirin, 81 mg, oral, Daily  fluticasone, 2 spray, Each Nostril, Daily  heparin, 5,000 Units, subcutaneous, q8h  hydrOXYzine HCL, 25 mg, oral, Daily  insulin lispro, 0-10 Units, subcutaneous, q4h      Continuous medications  HYDROmorphone,   lactated Ringer's, 75 mL/hr, Last Rate: 75 mL/hr (11/29/23 0744)      PRN medications  PRN medications: calcium gluconate, calcium gluconate, dextrose 10 % in water (D10W), dextrose, diazePAM, glucagon, labetaloL, magnesium sulfate, magnesium sulfate, naloxone, ondansetron, potassium chloride "     Results for orders placed or performed during the hospital encounter of 11/28/23 (from the past 24 hour(s))   ACTIVATED CLOTTING TIME LOW   Result Value Ref Range    POCT Activated Clotting Time Low Range 264 (H) 89 - 169 sec   ACTIVATED CLOTTING TIME LOW   Result Value Ref Range    POCT Activated Clotting Time Low Range 317 (H) 89 - 169 sec   ACTIVATED CLOTTING TIME LOW   Result Value Ref Range    POCT Activated Clotting Time Low Range 320 (H) 89 - 169 sec   ACTIVATED CLOTTING TIME LOW   Result Value Ref Range    POCT Activated Clotting Time Low Range 344 (H) 89 - 169 sec   ACTIVATED CLOTTING TIME LOW   Result Value Ref Range    POCT Activated Clotting Time Low Range 137 89 - 169 sec   BLOOD GAS ARTERIAL FULL PANEL   Result Value Ref Range    POCT pH, Arterial 7.29 (L) 7.38 - 7.42 pH    POCT pCO2, Arterial 40 38 - 42 mm Hg    POCT pO2, Arterial 216 (H) 85 - 95 mm Hg    POCT SO2, Arterial 100 94 - 100 %    POCT Oxy Hemoglobin, Arterial 98.7 (H) 94.0 - 98.0 %    POCT Hematocrit Calculated, Arterial 37.0 (L) 41.0 - 52.0 %    POCT Sodium, Arterial 133 (L) 136 - 145 mmol/L    POCT Potassium, Arterial 4.3 3.5 - 5.3 mmol/L    POCT Chloride, Arterial 106 98 - 107 mmol/L    POCT Ionized Calcium, Arterial 1.05 (L) 1.10 - 1.33 mmol/L    POCT Glucose, Arterial 147 (H) 74 - 99 mg/dL    POCT Lactate, Arterial 3.2 (H) 0.4 - 2.0 mmol/L    POCT Base Excess, Arterial -6.9 (L) -2.0 - 3.0 mmol/L    POCT HCO3 Calculated, Arterial 19.2 (L) 22.0 - 26.0 mmol/L    POCT Hemoglobin, Arterial 12.2 (L) 13.5 - 17.5 g/dL    POCT Anion Gap, Arterial 12 10 - 25 mmo/L    Patient Temperature 37.0 degrees Celsius    FiO2 50 %   Type and screen   Result Value Ref Range    ABO TYPE AB     Rh TYPE POS     ANTIBODY SCREEN NEG    CBC   Result Value Ref Range    WBC 12.7 (H) 4.4 - 11.3 x10*3/uL    nRBC 0.0 0.0 - 0.0 /100 WBCs    RBC 4.37 (L) 4.50 - 5.90 x10*6/uL    Hemoglobin 12.6 (L) 13.5 - 17.5 g/dL    Hematocrit 38.1 (L) 41.0 - 52.0 %    MCV 87 80 -  100 fL    MCH 28.8 26.0 - 34.0 pg    MCHC 33.1 32.0 - 36.0 g/dL    RDW 12.6 11.5 - 14.5 %    Platelets 237 150 - 450 x10*3/uL   Renal Function Panel   Result Value Ref Range    Glucose 149 (H) 74 - 99 mg/dL    Sodium 140 136 - 145 mmol/L    Potassium 4.1 3.5 - 5.3 mmol/L    Chloride 105 98 - 107 mmol/L    Bicarbonate 25 21 - 32 mmol/L    Anion Gap 14 10 - 20 mmol/L    Urea Nitrogen 9 6 - 23 mg/dL    Creatinine 0.74 0.50 - 1.30 mg/dL    eGFR >90 >60 mL/min/1.73m*2    Calcium 8.9 8.6 - 10.6 mg/dL    Phosphorus 4.4 2.5 - 4.9 mg/dL    Albumin 4.2 3.4 - 5.0 g/dL   Magnesium   Result Value Ref Range    Magnesium 1.40 (L) 1.60 - 2.40 mg/dL   Coagulation Screen   Result Value Ref Range    Protime 12.9 (H) 9.8 - 12.8 seconds    INR 1.1 0.9 - 1.1    aPTT 25 (L) 27 - 38 seconds   Blood Gas Arterial Full Panel   Result Value Ref Range    POCT pH, Arterial 7.36 (L) 7.38 - 7.42 pH    POCT pCO2, Arterial 47 (H) 38 - 42 mm Hg    POCT pO2, Arterial 88 85 - 95 mm Hg    POCT SO2, Arterial 97 94 - 100 %    POCT Oxy Hemoglobin, Arterial 95.4 94.0 - 98.0 %    POCT Hematocrit Calculated, Arterial 38.0 (L) 41.0 - 52.0 %    POCT Sodium, Arterial 137 136 - 145 mmol/L    POCT Potassium, Arterial 4.2 3.5 - 5.3 mmol/L    POCT Chloride, Arterial 104 98 - 107 mmol/L    POCT Ionized Calcium, Arterial 1.18 1.10 - 1.33 mmol/L    POCT Glucose, Arterial 153 (H) 74 - 99 mg/dL    POCT Lactate, Arterial 2.3 (H) 0.4 - 2.0 mmol/L    POCT Base Excess, Arterial 0.6 -2.0 - 3.0 mmol/L    POCT HCO3 Calculated, Arterial 26.6 (H) 22.0 - 26.0 mmol/L    POCT Hemoglobin, Arterial 12.8 (L) 13.5 - 17.5 g/dL    POCT Anion Gap, Arterial 11 10 - 25 mmo/L    Patient Temperature 37.0 degrees Celsius    FiO2 21 %   POCT GLUCOSE   Result Value Ref Range    POCT Glucose 144 (H) 74 - 99 mg/dL   POCT GLUCOSE   Result Value Ref Range    POCT Glucose 143 (H) 74 - 99 mg/dL   POCT GLUCOSE   Result Value Ref Range    POCT Glucose 137 (H) 74 - 99 mg/dL   Blood Gas Arterial Full Panel  Unsolicited   Result Value Ref Range    POCT pH, Arterial 7.41 7.38 - 7.42 pH    POCT pCO2, Arterial 42 38 - 42 mm Hg    POCT pO2, Arterial 82 (L) 85 - 95 mm Hg    POCT SO2, Arterial 97 94 - 100 %    POCT Oxy Hemoglobin, Arterial 95.4 94.0 - 98.0 %    POCT Hematocrit Calculated, Arterial 35.0 (L) 41.0 - 52.0 %    POCT Sodium, Arterial 131 (L) 136 - 145 mmol/L    POCT Potassium, Arterial 3.9 3.5 - 5.3 mmol/L    POCT Chloride, Arterial 101 98 - 107 mmol/L    POCT Ionized Calcium, Arterial 1.07 (L) 1.10 - 1.33 mmol/L    POCT Glucose, Arterial 127 (H) 74 - 99 mg/dL    POCT Lactate, Arterial 1.5 0.4 - 2.0 mmol/L    POCT Base Excess, Arterial 1.7 -2.0 - 3.0 mmol/L    POCT HCO3 Calculated, Arterial 26.6 (H) 22.0 - 26.0 mmol/L    POCT Hemoglobin, Arterial 11.7 (L) 13.5 - 17.5 g/dL    POCT Anion Gap, Arterial 7 (L) 10 - 25 mmo/L    Patient Temperature 37.0 degrees Celsius   Renal Function Panel   Result Value Ref Range    Glucose 121 (H) 74 - 99 mg/dL    Sodium 136 136 - 145 mmol/L    Potassium 3.9 3.5 - 5.3 mmol/L    Chloride 102 98 - 107 mmol/L    Bicarbonate 26 21 - 32 mmol/L    Anion Gap 12 10 - 20 mmol/L    Urea Nitrogen 10 6 - 23 mg/dL    Creatinine 0.78 0.50 - 1.30 mg/dL    eGFR >90 >60 mL/min/1.73m*2    Calcium 7.9 (L) 8.6 - 10.6 mg/dL    Phosphorus 3.2 2.5 - 4.9 mg/dL    Albumin 3.6 3.4 - 5.0 g/dL   CBC   Result Value Ref Range    WBC 12.0 (H) 4.4 - 11.3 x10*3/uL    nRBC 0.0 0.0 - 0.0 /100 WBCs    RBC 3.90 (L) 4.50 - 5.90 x10*6/uL    Hemoglobin 11.2 (L) 13.5 - 17.5 g/dL    Hematocrit 33.7 (L) 41.0 - 52.0 %    MCV 86 80 - 100 fL    MCH 28.7 26.0 - 34.0 pg    MCHC 33.2 32.0 - 36.0 g/dL    RDW 12.9 11.5 - 14.5 %    Platelets 196 150 - 450 x10*3/uL   Coagulation Screen   Result Value Ref Range    Protime 13.7 (H) 9.8 - 12.8 seconds    INR 1.2 (H) 0.9 - 1.1    aPTT 26 (L) 27 - 38 seconds   Calcium, Ionized   Result Value Ref Range    POCT Calcium, Ionized 1.04 (L) 1.1 - 1.33 mmol/L   Renal Function Panel   Result Value  Ref Range    Glucose 128 (H) 74 - 99 mg/dL    Sodium 134 (L) 136 - 145 mmol/L    Potassium 3.8 3.5 - 5.3 mmol/L    Chloride 101 98 - 107 mmol/L    Bicarbonate 26 21 - 32 mmol/L    Anion Gap 11 10 - 20 mmol/L    Urea Nitrogen 10 6 - 23 mg/dL    Creatinine 0.91 0.50 - 1.30 mg/dL    eGFR >90 >60 mL/min/1.73m*2    Calcium 7.8 (L) 8.6 - 10.6 mg/dL    Phosphorus 3.3 2.5 - 4.9 mg/dL    Albumin 3.7 3.4 - 5.0 g/dL   Magnesium   Result Value Ref Range    Magnesium 1.92 1.60 - 2.40 mg/dL   POCT GLUCOSE   Result Value Ref Range    POCT Glucose 123 (H) 74 - 99 mg/dL         This patient has a central line   Reason for the central line remaining today? Line unnecessary, will be removed today    This patient has a urinary catheter   Reason for the urinary catheter remaining today? critically ill patient who need accurate urinary output measurements      Assessment/Plan     Patrick Mckeon is a 28 y.o. 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         NEURO: Hx Depression and Anxiety .Neuro intact, MAEx4 to commands, no focal deficits. Post op pain  - b/l LAURA blocks performed preoperatively   - Continue dilaudid PCA  -PRN valium per surgical team for anxiety and muscle spasm  -Resume home Atarax 25 mg daily   -Neurovascular monitoring  -Home meds: Atarax 25 mg daily   -PT/OT following- OOB today      CV: Hyperlipidemia and Hypertension . SBP in the 180's after arrival to SICU. Sp labetalol 10 mg IV x1 and Hydralazine 10 mg IV x 1  Fibromuscular dysplasia of bilateral renal arteries sp  Left renal artery aneurysm resection and Right renal artery bypass  on 11/28/2023 with   - goal sbp 100-150 per vascular  surgery   -start aspirin today   - continuous ekg/abp/pap monitoring  -Home meds: Lisinopril 20 mg daily, Aspirin 81 mg daily, and Crestor 10 mg daily      PULM: On RA  - Q1h incentive spirometer while awake  - PRN CXR      GI:  -Keep NPO except limited oral meds ( aspirin and atarax)     : Baseline cr ~ 0.8 . Mild Hyponatremia ( 134)  - Check renal function panel daily and PRN ( recheck this afternoon)   - Maintenance IVF with LR @ 75 ml/hr   - Volume resuscitation as needed.    - Goal U/O >0.5ml/kg/hr.   -Keep dickson for today per vascular team   - Replete electrolytes to goal K>4, Mg>2, Phos>2.5, ionized Ca>1.10 if creatinine remains WNL or <1.5 with adequate uop.     HEME: Acute surgical blood loss anemia  - Check CBC, coags daily and PRN   - scds and SQH  for dvt prophylaxis.  - start SQH tonight     ENDO:  - Q4h BG and SSI Lispro per ICU protocol.     ID: afebrile, mild leukocytosis likely reactive post op   - Finished perioperative Cefazolin   - monitor for s/s infection     Lines:   11/28 RIJ MAC--> remove today   11/28 Right radial A line --> remove today      Dispo: continue ICU care. Discussed with  . OK for floor transfer this afternoon if remains stable         I spent 45 minutes in the professional and overall care of this patient.        Myesha Montes PA-C     Team phone [phone]

## 2023-11-29 NOTE — PROGRESS NOTES
VASCULAR SURGERY PROGRESS NOTE  Subjective   No acute overnight events. Reports small amount of clear emesis. Nauseous this morning. Continues to report significant incisional abdominal pain. 100-125cc/hr (>3L overall) UOP overnight.    Objective   Vitals:  Heart Rate:  []   Temp:  [36 °C (96.8 °F)-37.2 °C (99 °F)]   Resp:  [8-22]   BP: (125-152)/()   Weight:  [75.6 kg (166 lb 10.7 oz)]   SpO2:  [95 %-100 %]     Exam:  Constitutional: No acute distress, resting comfortably  Neuro:  AOx3, grossly intact  ENMT: moist mucous membranes  CV: no tachycardia  Pulm: non-labored on room air  GI: soft, appropriately tender, non-distended  Skin: midline incision with mild strikethrough  Musculoskeletal: moving all extremities  Extremities: palpable bilateral PT/DP pulses    Labs:  Results from last 7 days   Lab Units 11/29/23  0423 11/28/23  1702   WBC AUTO x10*3/uL 12.0* 12.7*   HEMOGLOBIN g/dL 11.2* 12.6*   PLATELETS AUTO x10*3/uL 196 237      Results from last 7 days   Lab Units 11/29/23  0423 11/28/23  1702   SODIUM mmol/L 136 140   POTASSIUM mmol/L 3.9 4.1   CHLORIDE mmol/L 102 105   CO2 mmol/L 26 25   BUN mg/dL 10 9   CREATININE mg/dL 0.78 0.74   GLUCOSE mg/dL 121* 149*   MAGNESIUM mg/dL  --  1.40*   PHOSPHORUS mg/dL 3.2 4.4      Results from last 7 days   Lab Units 11/29/23  0423 11/28/23  1709   INR  1.2* 1.1   PROTIME seconds 13.7* 12.9*   APTT seconds 26* 25*     Assessment/Plan   Patrick Mckeon is 28 y.o. male with history of Alport Syndrome, FMD, HLD, anxiety, depression, HLD, left renal artery aneurysm, renovascular hypertension who is s/p left renal artery aneurysm repair, right renal artery bypass.    Recovering well. No return of bowel function. Adequate UOP. Stable Creatinine    Plan:  - continue pain control with PCA  - appreciate acute pain recommends  - add Valium 5mg PRN for muscle spasms  - add IV tylonol for pain control  - SBP goal 100-160 mmHg  - continue aspirin 81mg daily  - keep  NPO for now  - maintain dickson, monitor UOP  - decrease IVF to 75cc/hr  - discontinue urine replacement  - daily labs  - OOB/PT/OT  - SQH and SCDs    D/w attending, Dr. Leticia House MD  Vascular Surgery PGY-4  Team pager: [phone]

## 2023-11-29 NOTE — CARE PLAN
The patient's goals for the shift include      The clinical goals for the shift include        Problem: Skin  Goal: Prevent/minimize sheer/friction injuries  Outcome: Progressing  Flowsheets (Taken 11/29/2023 0906)  Prevent/minimize sheer/friction injuries:   HOB 30 degrees or less   Turn/reposition every 2 hours/use positioning/transfer devices   Increase activity/out of bed for meals  Goal: Promote/optimize nutrition  Outcome: Progressing  Flowsheets (Taken 11/29/2023 0906)  Promote/optimize nutrition: Discuss with provider if NPO > 2 days  Goal: Promote skin healing  Outcome: Progressing  Flowsheets (Taken 11/29/2023 0906)  Promote skin healing: Turn/reposition every 2 hours/use positioning/transfer devices     Problem: Pain  Goal: My pain/discomfort is manageable  Outcome: Progressing  Goal: Ability to function at adequate level  Outcome: Progressing     Problem: Safety  Goal: Patient will be injury free during hospitalization  Outcome: Progressing  Goal: I will remain free of falls  Outcome: Progressing     Problem: Psychosocial Needs  Goal: Demonstrates ability to cope with hospitalization/illness  Outcome: Progressing  Goal: Collaborate with me, my family, and caregiver to identify my specific goals  Outcome: Progressing     Problem: Discharge Barriers  Goal: My discharge needs are met  Outcome: Progressing

## 2023-11-29 NOTE — CARE PLAN
Problem: Balance  Goal: Pt will score >/=24 on the Tinetti to indicate low fall risk   Outcome: Progressing     Problem: Mobility  Goal: Pt will complete bed mobility independently   Outcome: Progressing  Goal: Pt will amb >250ft with LRAD and SBA in prep for safe discharge home   Outcome: Progressing

## 2023-11-29 NOTE — PROGRESS NOTES
"Patrick Mckeon is a 28 y.o. male on day 1 of admission presenting with Renal artery stenosis (CMS/HCC).    Subjective   No active issues overnight          Last Recorded Vitals  Blood pressure 119/72, pulse 95, temperature 36.7 °C (98.1 °F), temperature source Temporal, resp. rate 22, height 1.778 m (5' 10\"), weight 75.6 kg (166 lb 10.7 oz), SpO2 93 %.  Intake/Output last 3 Shifts:  I/O last 3 completed shifts:  In: 5504.6 (72.8 mL/kg) [I.V.:4889.6 (64.7 mL/kg); Blood:490; IV Piggyback:125]  Out: 3635 (48.1 mL/kg) [Urine:3235 (1.2 mL/kg/hr); Blood:400]  Weight: 75.6 kg     This patient has a central line   Reason for the central line remaining today? Line unnecessary, will be removed today      Assessment/Plan   Principal Problem:    Renal artery stenosis (CMS/HCC)  Active Problems:    Renovascular hypertension    Fibromuscular dysplasia of bilateral renal arteries (CMS/HCC)    Middle aortic syndrome (CMS/HCC)    Aneurysm of left renal artery (CMS/HCC)    28year old with past medical history of bipolar disorder, renal artery stenosis and fibromuscular dysplasia. Presents to SICU sp right renal artery bypass and left renal artery aneurysm repair for close monitoring and BP control. No issues overnight, HDS, given atarax for anxiety.      Plan:  Neuro: dilaudid PCA for pain control, okay for PO meds per vasc, home atarax resumed overnight, per surgery request start valium.   CV: hx of HTN on home lisinopril now on hold, goal -150, BP within goals has not needed any drips. PRN hydral and labetalol.  Pulm: on RA/IS   : maintenance fluids decreased to 75cc/h, following sodium level this afternoon as has elza down trending making adequate urine, creta stable  GI: NPO per surgery, okay for PO meds   Heme:stbale H/H, JULI and  asp  Endo: SSI  ID: periop antibiotics completed  Dispo: possible RNF this afternoon        I spent 35 minutes in the professional and overall care of this patient.      Sandi Aragon, " MD

## 2023-11-30 ENCOUNTER — APPOINTMENT (OUTPATIENT)
Dept: RADIOLOGY | Facility: HOSPITAL | Age: 28
DRG: 660 | End: 2023-11-30
Payer: COMMERCIAL

## 2023-11-30 LAB
ALBUMIN SERPL BCP-MCNC: 4.2 G/DL (ref 3.4–5)
ANION GAP SERPL CALC-SCNC: 11 MMOL/L (ref 10–20)
BUN SERPL-MCNC: 7 MG/DL (ref 6–23)
CALCIUM SERPL-MCNC: 8.8 MG/DL (ref 8.6–10.6)
CHLORIDE SERPL-SCNC: 99 MMOL/L (ref 98–107)
CO2 SERPL-SCNC: 29 MMOL/L (ref 21–32)
CREAT SERPL-MCNC: 0.85 MG/DL (ref 0.5–1.3)
ERYTHROCYTE [DISTWIDTH] IN BLOOD BY AUTOMATED COUNT: 12.7 % (ref 11.5–14.5)
GFR SERPL CREATININE-BSD FRML MDRD: >90 ML/MIN/1.73M*2
GLUCOSE BLD MANUAL STRIP-MCNC: 107 MG/DL (ref 74–99)
GLUCOSE BLD MANUAL STRIP-MCNC: 92 MG/DL (ref 74–99)
GLUCOSE SERPL-MCNC: 96 MG/DL (ref 74–99)
HCT VFR BLD AUTO: 39.1 % (ref 41–52)
HGB BLD-MCNC: 12.7 G/DL (ref 13.5–17.5)
MAGNESIUM SERPL-MCNC: 1.85 MG/DL (ref 1.6–2.4)
MCH RBC QN AUTO: 29.2 PG (ref 26–34)
MCHC RBC AUTO-ENTMCNC: 32.5 G/DL (ref 32–36)
MCV RBC AUTO: 90 FL (ref 80–100)
NRBC BLD-RTO: 0 /100 WBCS (ref 0–0)
PHOSPHATE SERPL-MCNC: 1.7 MG/DL (ref 2.5–4.9)
PLATELET # BLD AUTO: 176 X10*3/UL (ref 150–450)
POTASSIUM SERPL-SCNC: 3.7 MMOL/L (ref 3.5–5.3)
RBC # BLD AUTO: 4.35 X10*6/UL (ref 4.5–5.9)
SODIUM SERPL-SCNC: 135 MMOL/L (ref 136–145)
WBC # BLD AUTO: 10 X10*3/UL (ref 4.4–11.3)

## 2023-11-30 PROCEDURE — 1200000002 HC GENERAL ROOM WITH TELEMETRY DAILY

## 2023-11-30 PROCEDURE — 96372 THER/PROPH/DIAG INJ SC/IM: CPT

## 2023-11-30 PROCEDURE — 2500000002 HC RX 250 W HCPCS SELF ADMINISTERED DRUGS (ALT 637 FOR MEDICARE OP, ALT 636 FOR OP/ED): Performed by: NURSE PRACTITIONER

## 2023-11-30 PROCEDURE — 71045 X-RAY EXAM CHEST 1 VIEW: CPT | Mod: FY

## 2023-11-30 PROCEDURE — 2500000004 HC RX 250 GENERAL PHARMACY W/ HCPCS (ALT 636 FOR OP/ED)

## 2023-11-30 PROCEDURE — 2500000001 HC RX 250 WO HCPCS SELF ADMINISTERED DRUGS (ALT 637 FOR MEDICARE OP): Performed by: STUDENT IN AN ORGANIZED HEALTH CARE EDUCATION/TRAINING PROGRAM

## 2023-11-30 PROCEDURE — 2500000001 HC RX 250 WO HCPCS SELF ADMINISTERED DRUGS (ALT 637 FOR MEDICARE OP): Performed by: NURSE PRACTITIONER

## 2023-11-30 PROCEDURE — 99233 SBSQ HOSP IP/OBS HIGH 50: CPT | Performed by: NURSE PRACTITIONER

## 2023-11-30 PROCEDURE — 2500000005 HC RX 250 GENERAL PHARMACY W/O HCPCS

## 2023-11-30 PROCEDURE — 2500000004 HC RX 250 GENERAL PHARMACY W/ HCPCS (ALT 636 FOR OP/ED): Performed by: NURSE PRACTITIONER

## 2023-11-30 PROCEDURE — 36415 COLL VENOUS BLD VENIPUNCTURE: CPT | Performed by: NURSE PRACTITIONER

## 2023-11-30 PROCEDURE — 71045 X-RAY EXAM CHEST 1 VIEW: CPT | Performed by: RADIOLOGY

## 2023-11-30 PROCEDURE — 99221 1ST HOSP IP/OBS SF/LOW 40: CPT | Performed by: INTERNAL MEDICINE

## 2023-11-30 PROCEDURE — 83735 ASSAY OF MAGNESIUM: CPT | Performed by: NURSE PRACTITIONER

## 2023-11-30 PROCEDURE — 80069 RENAL FUNCTION PANEL: CPT | Performed by: NURSE PRACTITIONER

## 2023-11-30 PROCEDURE — 2500000001 HC RX 250 WO HCPCS SELF ADMINISTERED DRUGS (ALT 637 FOR MEDICARE OP)

## 2023-11-30 PROCEDURE — 99231 SBSQ HOSP IP/OBS SF/LOW 25: CPT

## 2023-11-30 PROCEDURE — 85027 COMPLETE CBC AUTOMATED: CPT | Performed by: NURSE PRACTITIONER

## 2023-11-30 PROCEDURE — 82947 ASSAY GLUCOSE BLOOD QUANT: CPT

## 2023-11-30 RX ORDER — DIAZEPAM 5 MG/1
5 TABLET ORAL EVERY 8 HOURS PRN
Status: DISCONTINUED | OUTPATIENT
Start: 2023-11-30 | End: 2023-12-05 | Stop reason: HOSPADM

## 2023-11-30 RX ORDER — SENNOSIDES 8.6 MG/1
1 TABLET ORAL 2 TIMES DAILY
Status: DISCONTINUED | OUTPATIENT
Start: 2023-11-30 | End: 2023-12-05 | Stop reason: HOSPADM

## 2023-11-30 RX ORDER — LABETALOL HYDROCHLORIDE 5 MG/ML
10 INJECTION, SOLUTION INTRAVENOUS ONCE
Status: DISCONTINUED | OUTPATIENT
Start: 2023-11-30 | End: 2023-11-30

## 2023-11-30 RX ORDER — ONDANSETRON 4 MG/1
4 TABLET, ORALLY DISINTEGRATING ORAL EVERY 8 HOURS PRN
Status: DISCONTINUED | OUTPATIENT
Start: 2023-11-30 | End: 2023-12-01

## 2023-11-30 RX ORDER — METOPROLOL TARTRATE 25 MG/1
12.5 TABLET, FILM COATED ORAL 2 TIMES DAILY
Status: DISCONTINUED | OUTPATIENT
Start: 2023-11-30 | End: 2023-12-05 | Stop reason: HOSPADM

## 2023-11-30 RX ORDER — MAGNESIUM SULFATE HEPTAHYDRATE 40 MG/ML
2 INJECTION, SOLUTION INTRAVENOUS ONCE
Status: COMPLETED | OUTPATIENT
Start: 2023-11-30 | End: 2023-11-30

## 2023-11-30 RX ORDER — ACETAMINOPHEN 325 MG/1
650 TABLET ORAL EVERY 6 HOURS
Status: DISCONTINUED | OUTPATIENT
Start: 2023-11-30 | End: 2023-12-05 | Stop reason: HOSPADM

## 2023-11-30 RX ORDER — POTASSIUM CHLORIDE 20 MEQ/1
20 TABLET, EXTENDED RELEASE ORAL ONCE
Status: COMPLETED | OUTPATIENT
Start: 2023-11-30 | End: 2023-11-30

## 2023-11-30 RX ORDER — PANTOPRAZOLE SODIUM 40 MG/1
40 TABLET, DELAYED RELEASE ORAL
Status: DISCONTINUED | OUTPATIENT
Start: 2023-12-01 | End: 2023-12-05 | Stop reason: HOSPADM

## 2023-11-30 RX ORDER — LABETALOL HYDROCHLORIDE 5 MG/ML
10 INJECTION, SOLUTION INTRAVENOUS ONCE
Status: DISCONTINUED | OUTPATIENT
Start: 2023-11-30 | End: 2023-12-01

## 2023-11-30 RX ADMIN — METOPROLOL TARTRATE 12.5 MG: 25 TABLET, FILM COATED ORAL at 20:53

## 2023-11-30 RX ADMIN — DIAZEPAM 2 MG: 5 INJECTION, SOLUTION INTRAMUSCULAR; INTRAVENOUS at 02:24

## 2023-11-30 RX ADMIN — ACETAMINOPHEN 650 MG: 325 TABLET ORAL at 15:02

## 2023-11-30 RX ADMIN — STANDARDIZED SENNA CONCENTRATE 8.6 MG: 8.6 TABLET ORAL at 20:53

## 2023-11-30 RX ADMIN — ASPIRIN 81 MG CHEWABLE TABLET 81 MG: 81 TABLET CHEWABLE at 08:51

## 2023-11-30 RX ADMIN — LABETALOL HYDROCHLORIDE 10 MG: 5 INJECTION, SOLUTION INTRAVENOUS at 00:02

## 2023-11-30 RX ADMIN — ONDANSETRON 4 MG: 4 TABLET, ORALLY DISINTEGRATING ORAL at 22:46

## 2023-11-30 RX ADMIN — HEPARIN SODIUM 5000 UNITS: 5000 INJECTION INTRAVENOUS; SUBCUTANEOUS at 05:00

## 2023-11-30 RX ADMIN — ACETAMINOPHEN 650 MG: 325 TABLET ORAL at 20:53

## 2023-11-30 RX ADMIN — Medication: at 22:34

## 2023-11-30 RX ADMIN — DIAZEPAM 5 MG: 5 TABLET ORAL at 09:57

## 2023-11-30 RX ADMIN — POTASSIUM CHLORIDE 20 MEQ: 1500 TABLET, EXTENDED RELEASE ORAL at 15:02

## 2023-11-30 RX ADMIN — MAGNESIUM SULFATE HEPTAHYDRATE 2 G: 40 INJECTION, SOLUTION INTRAVENOUS at 18:46

## 2023-11-30 RX ADMIN — HEPARIN SODIUM 5000 UNITS: 5000 INJECTION INTRAVENOUS; SUBCUTANEOUS at 15:02

## 2023-11-30 RX ADMIN — SODIUM CHLORIDE, POTASSIUM CHLORIDE, SODIUM LACTATE AND CALCIUM CHLORIDE 75 ML/HR: 600; 310; 30; 20 INJECTION, SOLUTION INTRAVENOUS at 06:20

## 2023-11-30 RX ADMIN — DIAZEPAM 5 MG: 5 TABLET ORAL at 18:03

## 2023-11-30 ASSESSMENT — PAIN - FUNCTIONAL ASSESSMENT: PAIN_FUNCTIONAL_ASSESSMENT: 0-10

## 2023-11-30 ASSESSMENT — COGNITIVE AND FUNCTIONAL STATUS - GENERAL
MOBILITY SCORE: 24
DAILY ACTIVITIY SCORE: 24

## 2023-11-30 ASSESSMENT — PAIN SCALES - GENERAL
PAINLEVEL_OUTOF10: 1
PAINLEVEL_OUTOF10: 1

## 2023-11-30 NOTE — PROGRESS NOTES
VASCULAR SURGERY PROGRESS NOTE  Subjective   + flatus, mild nausea.   Was febrile and tachycardic overnight. Denies fevers/chills.     Objective   Vitals:    11/30/23 0400   BP: 108/73   Pulse: 84   Resp: 12   Temp: 36.6 °C (97.9 °F)   SpO2: 97%      Exam:  Constitutional: No acute distress, resting comfortably  Neuro:  AOx3, grossly intact  ENMT: moist mucous membranes  CV: sinus tachycardia on monitor   Pulm: non-labored on room air  GI: soft, appropriately tender, non-distended  Skin: midline incision intact  Musculoskeletal: moving all extremities  Extremities: palpable bilateral PT/DP pulses    Relevant Results  Medications:  Scheduled Meds:acetaminophen, 650 mg, oral, q6h  aspirin, 81 mg, oral, Daily  fluticasone, 2 spray, Each Nostril, Daily  heparin, 5,000 Units, subcutaneous, q8h  hydrOXYzine HCL, 25 mg, oral, Daily  insulin lispro, 0-10 Units, subcutaneous, q4h      Continuous Infusions:HYDROmorphone,       PRN Meds:.PRN medications: diazePAM, naloxone, ondansetron, oxygen    Labs:  Results from last 7 days   Lab Units 11/29/23  1151 11/29/23  0423 11/28/23  1702   WBC AUTO x10*3/uL 10.7 12.0* 12.7*   HEMOGLOBIN g/dL 11.9* 11.2* 12.6*   PLATELETS AUTO x10*3/uL 185 196 237      Results from last 7 days   Lab Units 11/29/23  1151 11/29/23  0623 11/29/23  0423   SODIUM mmol/L 136 134* 136   POTASSIUM mmol/L 4.1 3.8 3.9   CHLORIDE mmol/L 102 101 102   CO2 mmol/L 27 26 26   BUN mg/dL 11 10 10   CREATININE mg/dL 0.93 0.91 0.78   GLUCOSE mg/dL 106* 128* 121*   MAGNESIUM mg/dL 1.84 1.92  --    PHOSPHORUS mg/dL 2.9 3.3 3.2      Results from last 7 days   Lab Units 11/29/23  1151 11/29/23  0423 11/28/23  1709   INR  1.3* 1.2* 1.1   PROTIME seconds 14.3* 13.7* 12.9*   APTT seconds 24* 26* 25*     Assessment/Plan   28 y.o. male with history of Alport Syndrome, FMD, HLD, anxiety, depression, left renal artery aneurysm, renovascular hypertension who is s/p left renal artery aneurysm repair, right renal artery bypass.      11/30: + flatus, advanced to CLD. Tachycardic & febrile - ordered CXR. Dickson removed, voiding spontaneously.     Neuro: acute postoperative pain, anxiety, depression   - continue scheduled tylenol, PCA for pain control   - continue neurovascular checks every 4 hrs  - continue PRN valium   - continue atarax     CV: Alport syndrome, FMD, HLD, renovascular hypertension, renal artery aneurysm   - maintain blood pressure control  - continue ASA    Pulm: oxygen desaturation   - continue to encourage IS hourly while awake  - OOB to chair and increase ambulation as tolerated  - CXR today     GI:  renovascular hypertension, renal artery aneurysm s/p repair, hypoalbuminemia, hypokalemia   - advance to CLD   - continue PPI  - continue bowel regimen to prevent OIC   - replete electrolytes as needed to maintain K>4, Mg>2   - strict I&O  - discontinue IVF  - dickson removed 11/30  - RFP daily     Endo:    - no issues     Heme:   - no s/sx of bleeding  - CBC as clinically indicated    ID:   - trend temp q4  - CBC daily   - CXR today     Prophylaxis:  - SQH/SCDs    Dispo:   - will obtain PT eval  - continue care on regular nursing floor    MALCOLM Almeida-CNP

## 2023-11-30 NOTE — CARE PLAN
The patient's goals for the shift include      The clinical goals for the shift include Pt will have bp under 150 by the end of this shift      Problem: Skin  Goal: Prevent/minimize sheer/friction injuries  Outcome: Progressing  Goal: Promote/optimize nutrition  Outcome: Progressing  Goal: Promote skin healing  Outcome: Progressing     Problem: Pain  Goal: My pain/discomfort is manageable  Outcome: Progressing  Goal: Ability to function at adequate level  Outcome: Progressing     Problem: Safety  Goal: Patient will be injury free during hospitalization  Outcome: Progressing  Goal: I will remain free of falls  Outcome: Progressing     Problem: Psychosocial Needs  Goal: Demonstrates ability to cope with hospitalization/illness  Outcome: Progressing  Goal: Collaborate with me, my family, and caregiver to identify my specific goals  Outcome: Progressing     Problem: Discharge Barriers  Goal: My discharge needs are met  Outcome: Progressing

## 2023-11-30 NOTE — PROGRESS NOTES
Acute Pain Service    Postop Pain HPI -   Palliative: relieved with IV analgesics and regional local anesthetics  Provocative: movement  Quality:  burning and aching  Radiation:  none  Severity:  3/10  Timing: constant    24-HOUR OPIOID CONSUMPTION:  Dilaudid PCA    Scheduled medications  acetaminophen, 650 mg, oral, q6h  aspirin, 81 mg, oral, Daily  fluticasone, 2 spray, Each Nostril, Daily  heparin, 5,000 Units, subcutaneous, q8h  hydrOXYzine HCL, 25 mg, oral, Daily  [START ON 12/1/2023] pantoprazole, 40 mg, oral, Daily before breakfast  sennosides, 1 tablet, oral, BID      Continuous medications  HYDROmorphone,       PRN medications  PRN medications: diazePAM, naloxone, ondansetron, oxygen     Physical Exam:  Constitutional:  no distress, alert and cooperative  Eyes: clear sclera  Head/Neck: No apparent injury, trachea midline  Respiratory/Thorax: Patent airways, thorax symmetric, breathing comfortably  Cardiovascular: no pitting edema  Gastrointestinal: Nondistended  Musculoskeletal: ROM intact  Extremities: no clubbing  Neurological: alert, watson x4  Psychological: Appropriate affect    Results for orders placed or performed during the hospital encounter of 11/28/23 (from the past 24 hour(s))   POCT GLUCOSE   Result Value Ref Range    POCT Glucose 111 (H) 74 - 99 mg/dL   POCT GLUCOSE   Result Value Ref Range    POCT Glucose 108 (H) 74 - 99 mg/dL   POCT GLUCOSE   Result Value Ref Range    POCT Glucose 97 74 - 99 mg/dL   POCT GLUCOSE   Result Value Ref Range    POCT Glucose 107 (H) 74 - 99 mg/dL   POCT GLUCOSE   Result Value Ref Range    POCT Glucose 92 74 - 99 mg/dL   CBC   Result Value Ref Range    WBC 10.0 4.4 - 11.3 x10*3/uL    nRBC 0.0 0.0 - 0.0 /100 WBCs    RBC 4.35 (L) 4.50 - 5.90 x10*6/uL    Hemoglobin 12.7 (L) 13.5 - 17.5 g/dL    Hematocrit 39.1 (L) 41.0 - 52.0 %    MCV 90 80 - 100 fL    MCH 29.2 26.0 - 34.0 pg    MCHC 32.5 32.0 - 36.0 g/dL    RDW 12.7 11.5 - 14.5 %    Platelets 176 150 - 450 x10*3/uL    Renal function panel   Result Value Ref Range    Glucose 96 74 - 99 mg/dL    Sodium 135 (L) 136 - 145 mmol/L    Potassium 3.7 3.5 - 5.3 mmol/L    Chloride 99 98 - 107 mmol/L    Bicarbonate 29 21 - 32 mmol/L    Anion Gap 11 10 - 20 mmol/L    Urea Nitrogen 7 6 - 23 mg/dL    Creatinine 0.85 0.50 - 1.30 mg/dL    eGFR >90 >60 mL/min/1.73m*2    Calcium 8.8 8.6 - 10.6 mg/dL    Phosphorus 1.7 (L) 2.5 - 4.9 mg/dL    Albumin 4.2 3.4 - 5.0 g/dL   Magnesium   Result Value Ref Range    Magnesium 1.85 1.60 - 2.40 mg/dL      PLAN  - Bilateral LAURA nerve blocks with catheters placed preoperatively on 11/28, refill 11/30.   - Ambit ball with Ropivacaine 0.2%/NaCl 0.9% 500mL, Rate 7cc/hr  - Ambit medication will not interfere with pain medication prescribed by the primary team.   - Please be aware of local anesthetic toxic dose and absorption variability before considering lidocaine patches  - Acute pain service will follow while catheters in place    - IV Mg 1g Q8H x 3 doses, target 2.25  - IV Toradol 30mg Q6H for max 5 days per surgical service  - Tylenol 975mg Q8H, time 3 hours between tylenol and toradol  - PO Robaxin 1000mg QID  - Gabapentin 300mg at bedtime  - Oxycodone 5/10mg Q4H for mod/severe pain  - Rest of pain management per primary team     Acute Pain Resident  pg [phone] ph [phone]

## 2023-11-30 NOTE — CONSULTS
11/30/23  5:57 PM    Inpatient consult to Vascular Medicine  Consult performed by: Vivi Brown MD  Consult ordered by: MALCOLM Almeida-CNP      History Of Present Illness:    Patrick Mckeon is a 28 y.o. male well known to me from the outpatient clinic seen POD #1 extensive open renal artery reconstruction (right renal artery bypass grafting, left renal artery aneurysm resection). 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    He has had some high BP readings in past 24 hours. Is making good urine. He has pain when breathing and abdominal/back pain which is slowly getting better. He was able to get up and walk prior to my coming to see him.  He is very anxious in general and has had trouble sleeping. He is passing flatus.       Past Medical History:   Diagnosis Date    Anxiety     Depression     GERD (gastroesophageal reflux disease)     Hyperlipidemia     Hypertension     Renal artery stenosis (CMS/HCC)     Nep: Fabian Stokes, LOV 8/2023    Renal disease due to hypertension          Past Surgical History:  He has a past surgical history that includes Other surgical history (11/22/2022); CT angio abdomen pelvis w and or wo IV IV contrast (07/03/2023); CT angio head w and wo IV contrast (07/25/2023); CT angio neck (07/25/2023); IR angiogram renal bilateral (Bilateral, 08/15/2023); and Invasive Vascular Procedure (Bilateral, 10/12/2023).      Social History:  He reports that he has never smoked. He has never used smokeless tobacco. He reports that he does not currently use alcohol. He reports that he does not use drugs.    Family History:     Problem Relation    Coronary artery disease Father    Hyperlipidemia Father    Coronary artery disease Father's Sister    Other (CABG) Father's Sister    Coronary artery disease Paternal Grandmother    Heart attack Paternal Grandmother    Coronary artery disease Paternal Grandfather    Heart attack Paternal Grandfather        Current Facility-Administered Medications:     acetaminophen (Tylenol) tablet 650 mg, 650 mg, oral, q6h, BOZENA Almeida, 650 mg at 11/30/23 1502    aspirin chewable tablet 81 mg, 81 mg, oral, Daily, Dequan Bell MD, 81 mg at 11/30/23 0851    diazePAM (Valium) tablet 5 mg, 5 mg, oral, q8h PRN, BOZENA Almeida, 5 mg at 11/30/23 0957    fluticasone (Flonase) nasal spray 2 spray, 2 spray, Each Nostril, Daily, Dequan Bell MD    heparin (porcine) injection 5,000 Units, 5,000 Units, subcutaneous, q8h, Dequan Bell MD, 5,000 Units at 11/30/23 1502    hydromorphone PCA 0.5 mg/mL in NS opioid naive, , intravenous, Continuous, Dequan Bell MD, New Syringe/Cartridge at 11/29/23 0654    hydrOXYzine HCL (Atarax) tablet 25 mg, 25 mg, oral, Daily, Dequan Bell MD    labetaloL (Normodyne,Trandate) injection 10 mg, 10 mg, intravenous, Once, BOZENA Almeida    magnesium sulfate IV 2 g, 2 g, intravenous, Once, BOZENA Almeida    metoprolol tartrate (Lopressor) tablet 12.5 mg, 12.5 mg, oral, BID, Shiv House MD    naloxone (Narcan) injection 0.2 mg, 0.2 mg, intravenous, PRN, Dequan Bell MD    ondansetron (Zofran) injection 4 mg, 4 mg, intravenous, q4h PRN, Dequan Bell MD, 4 mg at 11/29/23 2218    oxygen (O2) therapy, , inhalation, Continuous PRN - O2/gases, Dequan Bell MD, 2 L/min at 11/30/23 0900    [START ON 12/1/2023] pantoprazole (ProtoNix) EC tablet 40 mg, 40 mg, oral, Daily before breakfast, BOZENA Almeida    sennosides (Senokot) tablet 8.6 mg, 1 tablet, oral, BID, Petra Cox, APRN-CNP     Allergies   Allergen Reactions    Amlodipine Other     swelling in lower extremities       Last Recorded Vitals:  Vitals:    11/30/23 1100 11/30/23 1142 11/30/23 1500 11/30/23 1700   BP: (!) 148/92 (!) 135/98 (!) 140/101 (!) 146/96   BP Location:       Pulse:  95  (!) 111   Resp:    18   Temp:  37 °C (98.6 °F)  37.1  °C (98.8 °F)   TempSrc:    Temporal   SpO2:  99%  98%   Weight:       Height:       Physical examination:  Bps as above, and HR high 90s-low 100s  JVP not elevated  +S1, S2, RRR; no m/r/g  Not taking deep breaths due to pain  Midline abdominal incision c/d/I  Abd mildly distended and bowel sounds hypoactive but abd soft  No LE edema and brisk pulses at the ankle  He was alert, oriented, interactive with fluid speech but has been anxious    CXR yesterday:   IMPRESSION: Stable appearance of the chest when compared to prior examination. Mild perihilar edema/atelectasis. Correlate with fluid status.        Component      Latest Ref Rng 11/29/2023 11/30/2023   GLUCOSE      74 - 99 mg/dL  96    SODIUM      136 - 145 mmol/L  135 (L)    POTASSIUM      3.5 - 5.3 mmol/L  3.7    CHLORIDE      98 - 107 mmol/L  99    Bicarbonate      21 - 32 mmol/L  29    Anion Gap      10 - 20 mmol/L  11    Blood Urea Nitrogen      6 - 23 mg/dL  7    Creatinine      0.50 - 1.30 mg/dL  0.85    EGFR      >60 mL/min/1.73m*2  >90    Calcium      8.6 - 10.6 mg/dL  8.8    PHOSPHORUS      2.5 - 4.9 mg/dL  1.7 (L)    Albumin      3.4 - 5.0 g/dL  4.2    WBC      4.4 - 11.3 x10*3/uL 10.7  10.0    nRBC      0.0 - 0.0 /100 WBCs 0.0  0.0    RBC      4.50 - 5.90 x10*6/uL 4.08 (L)  4.35 (L)    HEMOGLOBIN      13.5 - 17.5 g/dL 11.9 (L)  12.7 (L)    HEMATOCRIT      41.0 - 52.0 % 36.5 (L)  39.1 (L)    MCV      80 - 100 fL 90  90    MCH      26.0 - 34.0 pg 29.2  29.2    MCHC      32.0 - 36.0 g/dL 32.6  32.5    RED CELL DISTRIBUTION WIDTH      11.5 - 14.5 % 13.1  12.7    Platelets      150 - 450 x10*3/uL 185  176    Protime      9.8 - 12.8 seconds 14.3 (H)     INR      0.9 - 1.1  1.3 (H)     aPTT      27 - 38 seconds 24 (L)     MAGNESIUM      1.60 - 2.40 mg/dL 1.84  1.85    POCT Calcium, Ionized      1.1 - 1.33 mmol/L 1.11     POCT Glucose      74 - 99 mg/dL 97  92    POCT Glucose       108 (H)  107 (H)    POCT Glucose       111 (H)            Assessment/Plan  28  "y.o. man with complex renovascular disease (stenosis/aneurysm), likely FMD or middle aortic syndrome variant who is now POD #2 left renal artery aneurysm repair and right renal artery bypass graft (prosthetic).  He is clinically stable but has some atelectasis, tachyardia and is quite anxoius. He is making good urine and renal function stable. He was on Ace inhibitor as an outpatient, but I would not resume until at least POD #5.      I'd suggest beginning metoprolol tartrate 12.5 mg BID for some \"gentle\" BP BP control; agree with labetalol PRN.  Aspirin 81 mg/day as you are doing and subcutaneous heparin VTE prophylaxis as you are doing.  I advised Mr. Mckeon to use the spriometer 10 x Q hour and the importance of preventing further atelectasis.  Await histopathology to see if this demonstrates FMD.    I spoke with Dr. House at the bedside.  Following with you and will check in tomorrow.    Vivi Brown MD              "

## 2023-12-01 ENCOUNTER — PHARMACY VISIT (OUTPATIENT)
Dept: PHARMACY | Facility: CLINIC | Age: 28
End: 2023-12-01
Payer: COMMERCIAL

## 2023-12-01 ENCOUNTER — DOCUMENTATION (OUTPATIENT)
Dept: CARDIOLOGY | Facility: HOSPITAL | Age: 28
End: 2023-12-01
Payer: COMMERCIAL

## 2023-12-01 PROCEDURE — 2500000004 HC RX 250 GENERAL PHARMACY W/ HCPCS (ALT 636 FOR OP/ED): Performed by: NURSE PRACTITIONER

## 2023-12-01 PROCEDURE — 2500000004 HC RX 250 GENERAL PHARMACY W/ HCPCS (ALT 636 FOR OP/ED)

## 2023-12-01 PROCEDURE — 2500000002 HC RX 250 W HCPCS SELF ADMINISTERED DRUGS (ALT 637 FOR MEDICARE OP, ALT 636 FOR OP/ED): Performed by: NURSE PRACTITIONER

## 2023-12-01 PROCEDURE — 99231 SBSQ HOSP IP/OBS SF/LOW 25: CPT

## 2023-12-01 PROCEDURE — 2500000001 HC RX 250 WO HCPCS SELF ADMINISTERED DRUGS (ALT 637 FOR MEDICARE OP): Performed by: STUDENT IN AN ORGANIZED HEALTH CARE EDUCATION/TRAINING PROGRAM

## 2023-12-01 PROCEDURE — 2500000001 HC RX 250 WO HCPCS SELF ADMINISTERED DRUGS (ALT 637 FOR MEDICARE OP): Performed by: NURSE PRACTITIONER

## 2023-12-01 PROCEDURE — 96372 THER/PROPH/DIAG INJ SC/IM: CPT

## 2023-12-01 PROCEDURE — 99231 SBSQ HOSP IP/OBS SF/LOW 25: CPT | Performed by: INTERNAL MEDICINE

## 2023-12-01 PROCEDURE — 2500000001 HC RX 250 WO HCPCS SELF ADMINISTERED DRUGS (ALT 637 FOR MEDICARE OP)

## 2023-12-01 PROCEDURE — 1200000002 HC GENERAL ROOM WITH TELEMETRY DAILY

## 2023-12-01 PROCEDURE — RXMED WILLOW AMBULATORY MEDICATION CHARGE

## 2023-12-01 PROCEDURE — 99231 SBSQ HOSP IP/OBS SF/LOW 25: CPT | Performed by: NURSE PRACTITIONER

## 2023-12-01 RX ORDER — METOPROLOL TARTRATE 25 MG/1
12.5 TABLET, FILM COATED ORAL 2 TIMES DAILY
Qty: 30 TABLET | Refills: 0 | Status: SHIPPED | OUTPATIENT
Start: 2023-12-01 | End: 2023-12-04 | Stop reason: HOSPADM

## 2023-12-01 RX ORDER — OXYCODONE HYDROCHLORIDE 5 MG/1
5 TABLET ORAL EVERY 4 HOURS PRN
Status: DISCONTINUED | OUTPATIENT
Start: 2023-12-01 | End: 2023-12-05 | Stop reason: HOSPADM

## 2023-12-01 RX ORDER — POLYETHYLENE GLYCOL 3350 17 G/17G
17 POWDER, FOR SOLUTION ORAL DAILY PRN
Status: DISCONTINUED | OUTPATIENT
Start: 2023-12-01 | End: 2023-12-03

## 2023-12-01 RX ORDER — DOCUSATE SODIUM 100 MG/1
100 CAPSULE, LIQUID FILLED ORAL 2 TIMES DAILY
Status: DISCONTINUED | OUTPATIENT
Start: 2023-12-01 | End: 2023-12-05 | Stop reason: HOSPADM

## 2023-12-01 RX ORDER — ONDANSETRON 4 MG/1
4 TABLET, ORALLY DISINTEGRATING ORAL ONCE
Status: COMPLETED | OUTPATIENT
Start: 2023-12-01 | End: 2023-12-02

## 2023-12-01 RX ORDER — DIAZEPAM 5 MG/1
5 TABLET ORAL EVERY 8 HOURS PRN
Qty: 30 TABLET | Refills: 0 | Status: SHIPPED | OUTPATIENT
Start: 2023-12-01 | End: 2024-01-22 | Stop reason: ALTCHOICE

## 2023-12-01 RX ORDER — OXYCODONE HYDROCHLORIDE 5 MG/1
5 TABLET ORAL EVERY 4 HOURS PRN
Qty: 15 TABLET | Refills: 0 | Status: SHIPPED | OUTPATIENT
Start: 2023-12-01 | End: 2023-12-05 | Stop reason: SDUPTHER

## 2023-12-01 RX ORDER — HYDROMORPHONE HYDROCHLORIDE 1 MG/ML
0.2 INJECTION, SOLUTION INTRAMUSCULAR; INTRAVENOUS; SUBCUTANEOUS
Status: ACTIVE | OUTPATIENT
Start: 2023-12-01 | End: 2023-12-02

## 2023-12-01 RX ORDER — OXYCODONE HYDROCHLORIDE 5 MG/1
10 TABLET ORAL EVERY 4 HOURS PRN
Status: DISCONTINUED | OUTPATIENT
Start: 2023-12-01 | End: 2023-12-05 | Stop reason: HOSPADM

## 2023-12-01 RX ORDER — DOCUSATE SODIUM 100 MG/1
100 CAPSULE, LIQUID FILLED ORAL 2 TIMES DAILY
Qty: 60 CAPSULE | Refills: 0 | Status: SHIPPED | OUTPATIENT
Start: 2023-12-01 | End: 2023-12-31

## 2023-12-01 RX ORDER — ACETAMINOPHEN 325 MG/1
650 TABLET ORAL EVERY 6 HOURS
Qty: 56 TABLET | Refills: 0 | Status: SHIPPED | OUTPATIENT
Start: 2023-12-01 | End: 2023-12-08

## 2023-12-01 RX ADMIN — METOPROLOL TARTRATE 12.5 MG: 25 TABLET, FILM COATED ORAL at 20:53

## 2023-12-01 RX ADMIN — OXYCODONE HYDROCHLORIDE 5 MG: 5 TABLET ORAL at 10:18

## 2023-12-01 RX ADMIN — HEPARIN SODIUM 5000 UNITS: 5000 INJECTION INTRAVENOUS; SUBCUTANEOUS at 01:57

## 2023-12-01 RX ADMIN — DIAZEPAM 5 MG: 5 TABLET ORAL at 19:51

## 2023-12-01 RX ADMIN — ACETAMINOPHEN 650 MG: 325 TABLET ORAL at 19:51

## 2023-12-01 RX ADMIN — STANDARDIZED SENNA CONCENTRATE 8.6 MG: 8.6 TABLET ORAL at 20:53

## 2023-12-01 RX ADMIN — ACETAMINOPHEN 650 MG: 325 TABLET ORAL at 13:28

## 2023-12-01 RX ADMIN — ACETAMINOPHEN 650 MG: 325 TABLET ORAL at 08:10

## 2023-12-01 RX ADMIN — OXYCODONE HYDROCHLORIDE 5 MG: 5 TABLET ORAL at 19:47

## 2023-12-01 RX ADMIN — STANDARDIZED SENNA CONCENTRATE 8.6 MG: 8.6 TABLET ORAL at 09:00

## 2023-12-01 RX ADMIN — DIAZEPAM 5 MG: 5 TABLET ORAL at 01:57

## 2023-12-01 RX ADMIN — DOCUSATE SODIUM 100 MG: 100 CAPSULE, LIQUID FILLED ORAL at 08:20

## 2023-12-01 RX ADMIN — DIAZEPAM 5 MG: 5 TABLET ORAL at 10:16

## 2023-12-01 RX ADMIN — ASPIRIN 81 MG CHEWABLE TABLET 81 MG: 81 TABLET CHEWABLE at 08:11

## 2023-12-01 RX ADMIN — OXYCODONE HYDROCHLORIDE 5 MG: 5 TABLET ORAL at 14:34

## 2023-12-01 RX ADMIN — HEPARIN SODIUM 5000 UNITS: 5000 INJECTION INTRAVENOUS; SUBCUTANEOUS at 14:34

## 2023-12-01 RX ADMIN — DOCUSATE SODIUM 100 MG: 100 CAPSULE, LIQUID FILLED ORAL at 20:53

## 2023-12-01 RX ADMIN — PANTOPRAZOLE SODIUM 40 MG: 40 TABLET, DELAYED RELEASE ORAL at 06:36

## 2023-12-01 RX ADMIN — HEPARIN SODIUM 5000 UNITS: 5000 INJECTION INTRAVENOUS; SUBCUTANEOUS at 08:15

## 2023-12-01 RX ADMIN — METOPROLOL TARTRATE 12.5 MG: 25 TABLET, FILM COATED ORAL at 08:11

## 2023-12-01 SDOH — SOCIAL STABILITY: SOCIAL INSECURITY: HAS ANYONE EVER THREATENED TO HURT YOUR FAMILY OR YOUR PETS?: NO

## 2023-12-01 SDOH — SOCIAL STABILITY: SOCIAL INSECURITY: ARE YOU OR HAVE YOU BEEN THREATENED OR ABUSED PHYSICALLY, EMOTIONALLY, OR SEXUALLY BY ANYONE?: NO

## 2023-12-01 SDOH — SOCIAL STABILITY: SOCIAL INSECURITY: DO YOU FEEL UNSAFE GOING BACK TO THE PLACE WHERE YOU ARE LIVING?: NO

## 2023-12-01 SDOH — SOCIAL STABILITY: SOCIAL INSECURITY: ABUSE: ADULT

## 2023-12-01 SDOH — SOCIAL STABILITY: SOCIAL INSECURITY: DO YOU FEEL ANYONE HAS EXPLOITED OR TAKEN ADVANTAGE OF YOU FINANCIALLY OR OF YOUR PERSONAL PROPERTY?: NO

## 2023-12-01 SDOH — SOCIAL STABILITY: SOCIAL INSECURITY: HAVE YOU HAD THOUGHTS OF HARMING ANYONE ELSE?: NO

## 2023-12-01 SDOH — SOCIAL STABILITY: SOCIAL INSECURITY: DOES ANYONE TRY TO KEEP YOU FROM HAVING/CONTACTING OTHER FRIENDS OR DOING THINGS OUTSIDE YOUR HOME?: NO

## 2023-12-01 SDOH — SOCIAL STABILITY: SOCIAL INSECURITY: ARE THERE ANY APPARENT SIGNS OF INJURIES/BEHAVIORS THAT COULD BE RELATED TO ABUSE/NEGLECT?: NO

## 2023-12-01 SDOH — SOCIAL STABILITY: SOCIAL INSECURITY: WERE YOU ABLE TO COMPLETE ALL THE BEHAVIORAL HEALTH SCREENINGS?: YES

## 2023-12-01 ASSESSMENT — PAIN SCALES - WONG BAKER
WONGBAKER_NUMERICALRESPONSE: HURTS LITTLE BIT

## 2023-12-01 ASSESSMENT — PAIN - FUNCTIONAL ASSESSMENT
PAIN_FUNCTIONAL_ASSESSMENT: 0-10

## 2023-12-01 ASSESSMENT — COGNITIVE AND FUNCTIONAL STATUS - GENERAL
DAILY ACTIVITIY SCORE: 24
DAILY ACTIVITIY SCORE: 24
MOBILITY SCORE: 24
PATIENT BASELINE BEDBOUND: NO
MOBILITY SCORE: 24

## 2023-12-01 ASSESSMENT — LIFESTYLE VARIABLES
HOW OFTEN DO YOU HAVE 6 OR MORE DRINKS ON ONE OCCASION: NEVER
AUDIT-C TOTAL SCORE: 0
SKIP TO QUESTIONS 9-10: 1
HOW OFTEN DO YOU HAVE A DRINK CONTAINING ALCOHOL: NEVER
AUDIT-C TOTAL SCORE: 0
HOW MANY STANDARD DRINKS CONTAINING ALCOHOL DO YOU HAVE ON A TYPICAL DAY: PATIENT DOES NOT DRINK

## 2023-12-01 ASSESSMENT — PAIN SCALES - GENERAL
PAINLEVEL_OUTOF10: 0 - NO PAIN
PAINLEVEL_OUTOF10: 3
PAINLEVEL_OUTOF10: 1
PAINLEVEL_OUTOF10: 4
PAINLEVEL_OUTOF10: 4

## 2023-12-01 ASSESSMENT — ACTIVITIES OF DAILY LIVING (ADL)
LACK_OF_TRANSPORTATION: NO
HEARING - RIGHT EAR: FUNCTIONAL
GROOMING: INDEPENDENT
TOILETING: INDEPENDENT
PATIENT'S MEMORY ADEQUATE TO SAFELY COMPLETE DAILY ACTIVITIES?: YES
DRESSING YOURSELF: INDEPENDENT
ADEQUATE_TO_COMPLETE_ADL: YES
HEARING - LEFT EAR: FUNCTIONAL
WALKS IN HOME: INDEPENDENT
JUDGMENT_ADEQUATE_SAFELY_COMPLETE_DAILY_ACTIVITIES: YES
FEEDING YOURSELF: INDEPENDENT
BATHING: INDEPENDENT

## 2023-12-01 ASSESSMENT — PAIN DESCRIPTION - LOCATION
LOCATION: ABDOMEN

## 2023-12-01 ASSESSMENT — PAIN DESCRIPTION - ORIENTATION
ORIENTATION: MID
ORIENTATION: MID

## 2023-12-01 ASSESSMENT — PATIENT HEALTH QUESTIONNAIRE - PHQ9
1. LITTLE INTEREST OR PLEASURE IN DOING THINGS: NEARLY EVERY DAY
2. FEELING DOWN, DEPRESSED OR HOPELESS: MORE THAN HALF THE DAYS
SUM OF ALL RESPONSES TO PHQ9 QUESTIONS 1 & 2: 5

## 2023-12-01 NOTE — PROGRESS NOTES
12/01/23  3:23 PM    Vascular Medicine Follow-up    I saw and examined Mr. Mckeon.  He walked the halls earlier today, making good urine, passing flatus (no BM yet).    Bps improved on metoprolol 12.5 mg BID.       No current facility-administered medications for this visit.    Current Outpatient Medications:     acetaminophen (Tylenol) 325 mg tablet, Take 2 tablets (650 mg) by mouth every 6 hours for 7 days., Disp: 56 tablet, Rfl: 0    diazePAM (Valium) 5 mg tablet, Take 1 tablet (5 mg) by mouth every 8 hours if needed for muscle spasms or anxiety for up to 10 days., Disp: 30 tablet, Rfl: 0    docusate sodium (Colace) 100 mg capsule, Take 1 capsule (100 mg) by mouth 2 times a day., Disp: 60 capsule, Rfl: 0    metoprolol tartrate (Lopressor) 25 mg tablet, Take 0.5 tablets (12.5 mg) by mouth 2 times a day., Disp: 30 tablet, Rfl: 0    oxyCODONE (Roxicodone) 5 mg immediate release tablet, Take 1 tablet (5 mg) by mouth every 4 hours if needed for moderate pain (4 - 6) for up to 5 days., Disp: 15 tablet, Rfl: 0    Facility-Administered Medications Ordered in Other Visits:     acetaminophen (Tylenol) tablet 650 mg, 650 mg, oral, q6h, Petra Cox, APRN-CNP, 650 mg at 12/01/23 1328    aspirin chewable tablet 81 mg, 81 mg, oral, Daily, Dequan Bell MD, 81 mg at 12/01/23 0811    diazePAM (Valium) tablet 5 mg, 5 mg, oral, q8h PRN, Petra Cox, APRN-CNP, 5 mg at 12/01/23 1016    docusate sodium (Colace) capsule 100 mg, 100 mg, oral, BID, Shiv House MD, 100 mg at 12/01/23 0820    fluticasone (Flonase) nasal spray 2 spray, 2 spray, Each Nostril, Daily, Dequan Bell MD    heparin (porcine) injection 5,000 Units, 5,000 Units, subcutaneous, q8h, Dequan Bell MD, 5,000 Units at 12/01/23 1434    HYDROmorphone (Dilaudid) injection 0.2 mg, 0.2 mg, intravenous, q3h PRN, Shiv House MD    hydrOXYzine HCL (Atarax) tablet 25 mg, 25 mg, oral, Daily, Dequan Bell MD    metoprolol  tartrate (Lopressor) tablet 12.5 mg, 12.5 mg, oral, BID, Shiv House MD, 12.5 mg at 12/01/23 0811    oxyCODONE (Roxicodone) immediate release tablet 10 mg, 10 mg, oral, q4h PRN, Shiv House MD    oxyCODONE (Roxicodone) immediate release tablet 5 mg, 5 mg, oral, q4h PRN, Shiv House MD, 5 mg at 12/01/23 1434    oxygen (O2) therapy, , inhalation, Continuous PRN - O2/gases, Dequan Bell MD, 2 L/min at 11/30/23 0900    pantoprazole (ProtoNix) EC tablet 40 mg, 40 mg, oral, Daily before breakfast, BOZENA Almeida, 40 mg at 12/01/23 0636    polyethylene glycol (Glycolax, Miralax) packet 17 g, 17 g, oral, Daily PRN, Shiv House MD    sennosides (Senokot) tablet 8.6 mg, 1 tablet, oral, BID, BOZENA Almeida, 8.6 mg at 12/01/23 0900    On examination      11/30/2023    11:42 AM 11/30/2023     3:00 PM 11/30/2023     5:00 PM 11/30/2023     8:42 PM 12/1/2023     2:09 AM 12/1/2023     8:06 AM 12/1/2023    11:11 AM   Vitals   Systolic 135 140 146  124 124 134   Diastolic 98 101 96  89 71 87   Heart Rate 95  111 99 92 77 83   Temp 37 °C (98.6 °F)  37.1 °C (98.8 °F) 36.5 °C (97.7 °F) 36.1 °C (97 °F) 37 °C (98.6 °F) 36.8 °C (98.2 °F)   Resp   18 20 20 20 18   He appeared more comfortable today  Abd incision c/d/I  Abd soft  No edema    Component      Latest Ref Rng 11/30/2023   GLUCOSE      74 - 99 mg/dL 96    SODIUM      136 - 145 mmol/L 135 (L)    POTASSIUM      3.5 - 5.3 mmol/L 3.7    CHLORIDE      98 - 107 mmol/L 99    Bicarbonate      21 - 32 mmol/L 29    Anion Gap      10 - 20 mmol/L 11    Blood Urea Nitrogen      6 - 23 mg/dL 7    Creatinine      0.50 - 1.30 mg/dL 0.85    EGFR      >60 mL/min/1.73m*2 >90    Calcium      8.6 - 10.6 mg/dL 8.8    PHOSPHORUS      2.5 - 4.9 mg/dL 1.7 (L)    Albumin      3.4 - 5.0 g/dL 4.2    MAGNESIUM      1.60 - 2.40 mg/dL 1.85       Legend:  (L) Low    Mr. Mckeon is progressing now POD #3 extensive aortic surgery.  Metoprolol tartrate 12.5 mg BID  has helped with BP -- he could be discharged on metoprolol succinate 25 mg every day or if team is comfortable resuming lisinopril 2.5 mg every day that could be done as well.    I will see him if he is in hospital Sunday but if he is discharged before I return, no problem. I will see him as an outpatient and will also follow-up on his histopathology.    Vivi Brown MD

## 2023-12-01 NOTE — PROGRESS NOTES
VASCULAR SURGERY PROGRESS NOTE  Subjective   + flatus, no bowel movement. Denies fevers/chills malaise, nausea, or vomiting    Objective   Vitals:    12/01/23 0806   BP: 124/71   Pulse: 77   Resp: 20   Temp: 37 °C (98.6 °F)   SpO2: 100%      Exam:  Constitutional: No acute distress, resting comfortably  Neuro:  AOx3, grossly intact  ENMT: moist mucous membranes  CV: sinus tachycardia on monitor   Pulm: non-labored on room air  GI: soft, appropriately tender, non-distended  Skin: midline incision intact  Musculoskeletal: moving all extremities  Extremities: palpable bilateral PT/DP pulses    Relevant Results  Medications:  Scheduled Meds:acetaminophen, 650 mg, oral, q6h  aspirin, 81 mg, oral, Daily  docusate sodium, 100 mg, oral, BID  fluticasone, 2 spray, Each Nostril, Daily  heparin, 5,000 Units, subcutaneous, q8h  hydrOXYzine HCL, 25 mg, oral, Daily  metoprolol tartrate, 12.5 mg, oral, BID  pantoprazole, 40 mg, oral, Daily before breakfast  sennosides, 1 tablet, oral, BID      Continuous Infusions:     PRN Meds:.PRN medications: diazePAM, HYDROmorphone, oxyCODONE, oxyCODONE, oxygen, polyethylene glycol    Labs:  Results from last 7 days   Lab Units 11/30/23  1155 11/29/23  1151 11/29/23  0423   WBC AUTO x10*3/uL 10.0 10.7 12.0*   HEMOGLOBIN g/dL 12.7* 11.9* 11.2*   PLATELETS AUTO x10*3/uL 176 185 196      Results from last 7 days   Lab Units 11/30/23  1155 11/29/23  1151 11/29/23  0623   SODIUM mmol/L 135* 136 134*   POTASSIUM mmol/L 3.7 4.1 3.8   CHLORIDE mmol/L 99 102 101   CO2 mmol/L 29 27 26   BUN mg/dL 7 11 10   CREATININE mg/dL 0.85 0.93 0.91   GLUCOSE mg/dL 96 106* 128*   MAGNESIUM mg/dL 1.85 1.84 1.92   PHOSPHORUS mg/dL 1.7* 2.9 3.3        Results from last 7 days   Lab Units 11/29/23  1151 11/29/23  0423 11/28/23  1709   INR  1.3* 1.2* 1.1   PROTIME seconds 14.3* 13.7* 12.9*   APTT seconds 24* 26* 25*       Assessment/Plan   28 y.o. male with history of Alport Syndrome, FMD, HLD, anxiety, depression, left  renal artery aneurysm, renovascular hypertension who is s/p left renal artery aneurysm repair, right renal artery bypass.     11/30: + flatus, advanced to CLD. Tachycardic & febrile - ordered CXR. Borja removed, voiding spontaneously.     Neuro: acute postoperative pain, anxiety, depression   -Discontinue PCA  - continue scheduled tylenol, PRN oxy and IV dilaudid for breakthrough pain   - continue neurovascular checks every 4 hrs  - continue PRN valium   - continue atarax for anxiety    CV: Alport syndrome, FMD, HLD, renovascular hypertension, renal artery aneurysm   - maintain blood pressure control with metoprolol   - continue ASA    Pulm: oxygen desaturation   - continue to encourage IS hourly while awake  - OOB to chair and increase ambulation as tolerated    GI:  renovascular hypertension, renal artery aneurysm s/p repair, hypoalbuminemia, hypokalemia   -Continue regular diet  - continue PPI  - continue bowel regimen to prevent OIC   - replete electrolytes as needed to maintain K>4, Mg>2   - strict I&Os  - RFP daily     Endo:    - no issues     Heme:   - no s/sx of bleeding  - CBC as clinically indicated    ID:   -Afebrile   -No leukocytosis   -trend temp q4  -CBC daily     MSK:  -PT eval    Prophylaxis:  - SQH/SCDs    Dispo:   - continue care on regular nursing floor  -Possible discharge over the weekend    MALCOLM Dumont-CNP

## 2023-12-01 NOTE — PROGRESS NOTES
12/01/23  3:23 PM     Vascular Medicine Follow-up     I saw and examined Mr. Mckeon on [place] [address].  No overnight events.  He is POD #3 extensive bilateral aortic surgery (right aortorenal grafting, left renal aneurysm resection).  He walked the halls earlier today, making good urine, passing flatus (no BM yet). He is using the spirometer.     Bps improved on metoprolol 12.5 mg BID.        No current facility-administered medications for this visit.     Current Outpatient Medications:     acetaminophen (Tylenol) 325 mg tablet, Take 2 tablets (650 mg) by mouth every 6 hours for 7 days., Disp: 56 tablet, Rfl: 0    diazePAM (Valium) 5 mg tablet, Take 1 tablet (5 mg) by mouth every 8 hours if needed for muscle spasms or anxiety for up to 10 days., Disp: 30 tablet, Rfl: 0    docusate sodium (Colace) 100 mg capsule, Take 1 capsule (100 mg) by mouth 2 times a day., Disp: 60 capsule, Rfl: 0    metoprolol tartrate (Lopressor) 25 mg tablet, Take 0.5 tablets (12.5 mg) by mouth 2 times a day., Disp: 30 tablet, Rfl: 0    oxyCODONE (Roxicodone) 5 mg immediate release tablet, Take 1 tablet (5 mg) by mouth every 4 hours if needed for moderate pain (4 - 6) for up to 5 days., Disp: 15 tablet, Rfl: 0     Facility-Administered Medications Ordered in Other Visits:     acetaminophen (Tylenol) tablet 650 mg, 650 mg, oral, q6h, MALCOLM Almeida-CNP, 650 mg at 12/01/23 1328    aspirin chewable tablet 81 mg, 81 mg, oral, Daily, Dequan Bell MD, 81 mg at 12/01/23 0811    diazePAM (Valium) tablet 5 mg, 5 mg, oral, q8h PRN, MALCOLM Almeida-CNP, 5 mg at 12/01/23 1016    docusate sodium (Colace) capsule 100 mg, 100 mg, oral, BID, Shiv House MD, 100 mg at 12/01/23 0820    fluticasone (Flonase) nasal spray 2 spray, 2 spray, Each Nostril, Daily, Dequan Bell MD    heparin (porcine) injection 5,000 Units, 5,000 Units, subcutaneous, q8h, Dequan Bell MD, 5,000 Units at 12/01/23 1434    HYDROmorphone  (Dilaudid) injection 0.2 mg, 0.2 mg, intravenous, q3h PRN, Shiv House MD    hydrOXYzine HCL (Atarax) tablet 25 mg, 25 mg, oral, Daily, Dequan Bell MD    metoprolol tartrate (Lopressor) tablet 12.5 mg, 12.5 mg, oral, BID, Shiv House MD, 12.5 mg at 12/01/23 0811    oxyCODONE (Roxicodone) immediate release tablet 10 mg, 10 mg, oral, q4h PRN, Shiv House MD    oxyCODONE (Roxicodone) immediate release tablet 5 mg, 5 mg, oral, q4h PRN, Shiv House MD, 5 mg at 12/01/23 1434    oxygen (O2) therapy, , inhalation, Continuous PRN - O2/gases, Dequan Bell MD, 2 L/min at 11/30/23 0900    pantoprazole (ProtoNix) EC tablet 40 mg, 40 mg, oral, Daily before breakfast, BOZENA Almeida, 40 mg at 12/01/23 0636    polyethylene glycol (Glycolax, Miralax) packet 17 g, 17 g, oral, Daily PRN, Shiv House MD    sennosides (Senokot) tablet 8.6 mg, 1 tablet, oral, BID, BOZENA Almeida, 8.6 mg at 12/01/23 0900     On examination       11/30/2023    11:42 AM 11/30/2023     3:00 PM 11/30/2023     5:00 PM 11/30/2023     8:42 PM 12/1/2023     2:09 AM 12/1/2023     8:06 AM 12/1/2023    11:11 AM   Vitals   Systolic 135 140 146   124 124 134   Diastolic 98 101 96   89 71 87   Heart Rate 95   111 99 92 77 83   Temp 37 °C (98.6 °F)   37.1 °C (98.8 °F) 36.5 °C (97.7 °F) 36.1 °C (97 °F) 37 °C (98.6 °F) 36.8 °C (98.2 °F)   Resp     18 20 20 20 18   He appeared more comfortable today  Abd incision c/d/I  Abd soft  No edema     Component      Latest Ref Rng 11/30/2023   GLUCOSE      74 - 99 mg/dL 96    SODIUM      136 - 145 mmol/L 135 (L)    POTASSIUM      3.5 - 5.3 mmol/L 3.7    CHLORIDE      98 - 107 mmol/L 99    Bicarbonate      21 - 32 mmol/L 29    Anion Gap      10 - 20 mmol/L 11    Blood Urea Nitrogen      6 - 23 mg/dL 7    Creatinine      0.50 - 1.30 mg/dL 0.85    EGFR      >60 mL/min/1.73m*2 >90    Calcium      8.6 - 10.6 mg/dL 8.8    PHOSPHORUS      2.5 - 4.9 mg/dL 1.7 (L)    Albumin      3.4  - 5.0 g/dL 4.2    MAGNESIUM      1.60 - 2.40 mg/dL 1.85       Legend:  (L) Low     Mr. Mckeon is progressing now POD #3 extensive aortic surgery.  Metoprolol tartrate 12.5 mg BID has helped with BP -- he could be discharged on metoprolol succinate 25 mg every day or if team is comfortable resuming lisinopril 2.5 mg every day that could be done as well.     I will see him if he is in hospital Sunday but if he is discharged before I return, no problem. I will see him as an outpatient and will also follow-up on his histopathology.            Vivi Brown MD

## 2023-12-01 NOTE — PROGRESS NOTES
Acute Pain Service    Postop Pain HPI -   Palliative: relieved with IV analgesics and regional local anesthetics  Provocative: movement  Quality:  burning and aching  Radiation:  none  Severity:  3/10  Timing: constant    24-HOUR OPIOID CONSUMPTION:  Dilaudid PCA    Scheduled medications  acetaminophen, 650 mg, oral, q6h  aspirin, 81 mg, oral, Daily  fluticasone, 2 spray, Each Nostril, Daily  heparin, 5,000 Units, subcutaneous, q8h  hydrOXYzine HCL, 25 mg, oral, Daily  labetaloL, 10 mg, intravenous, Once  metoprolol tartrate, 12.5 mg, oral, BID  pantoprazole, 40 mg, oral, Daily before breakfast  sennosides, 1 tablet, oral, BID      Continuous medications  HYDROmorphone,       PRN medications  PRN medications: diazePAM, naloxone, ondansetron ODT, oxygen     Physical Exam:  Constitutional:  no distress, alert and cooperative  Eyes: clear sclera  Head/Neck: No apparent injury, trachea midline  Respiratory/Thorax: Patent airways, thorax symmetric, breathing comfortably  Cardiovascular: no pitting edema  Gastrointestinal: Nondistended  Musculoskeletal: ROM intact  Extremities: no clubbing  Neurological: alert, watson x4  Psychological: Appropriate affect    Results for orders placed or performed during the hospital encounter of 11/28/23 (from the past 24 hour(s))   POCT GLUCOSE   Result Value Ref Range    POCT Glucose 107 (H) 74 - 99 mg/dL   POCT GLUCOSE   Result Value Ref Range    POCT Glucose 92 74 - 99 mg/dL   CBC   Result Value Ref Range    WBC 10.0 4.4 - 11.3 x10*3/uL    nRBC 0.0 0.0 - 0.0 /100 WBCs    RBC 4.35 (L) 4.50 - 5.90 x10*6/uL    Hemoglobin 12.7 (L) 13.5 - 17.5 g/dL    Hematocrit 39.1 (L) 41.0 - 52.0 %    MCV 90 80 - 100 fL    MCH 29.2 26.0 - 34.0 pg    MCHC 32.5 32.0 - 36.0 g/dL    RDW 12.7 11.5 - 14.5 %    Platelets 176 150 - 450 x10*3/uL   Renal function panel   Result Value Ref Range    Glucose 96 74 - 99 mg/dL    Sodium 135 (L) 136 - 145 mmol/L    Potassium 3.7 3.5 - 5.3 mmol/L    Chloride 99 98 - 107  mmol/L    Bicarbonate 29 21 - 32 mmol/L    Anion Gap 11 10 - 20 mmol/L    Urea Nitrogen 7 6 - 23 mg/dL    Creatinine 0.85 0.50 - 1.30 mg/dL    eGFR >90 >60 mL/min/1.73m*2    Calcium 8.8 8.6 - 10.6 mg/dL    Phosphorus 1.7 (L) 2.5 - 4.9 mg/dL    Albumin 4.2 3.4 - 5.0 g/dL   Magnesium   Result Value Ref Range    Magnesium 1.85 1.60 - 2.40 mg/dL      PLAN  - Bilateral LAURA nerve blocks with catheters placed preoperatively on 11/28, removed 12/1 AM.   - Ambit ball with Ropivacaine 0.2%/NaCl 0.9% 500mL, Rate 7cc/hr  - Ambit medication will not interfere with pain medication prescribed by the primary team.   - Please be aware of local anesthetic toxic dose and absorption variability before considering lidocaine patches    - IV Mg 1g Q8H x 3 doses, target 2.25  - IV Toradol 30mg Q6H for max 5 days per surgical service  - Tylenol 975mg Q8H, time 3 hours between tylenol and toradol  - PO Robaxin 1000mg QID  - Lidocaine patches around affected site as needed  - Gabapentin 300mg at bedtime  - Oxycodone 5/10mg Q4H for mod/severe pain  - Dilaudid 0.2mg Q4H for breakthrough pain    - Rest of pain management per primary team  - Acute pain service will sign off     Acute Pain Resident  pg [phone] ph [phone]

## 2023-12-01 NOTE — PROGRESS NOTES
Physical Therapy                 Therapy Communication Note    Patient Name: Patrick Mckeon  MRN: [id number]  Today's Date: 12/1/2023     Discipline: Physical Therapy    Missed Visit Reason: Missed Visit Reason: Patient refused (Pt supine in bed upon arrival and reported he already walked this date and is feeling sleepy.)    Missed Time: Attempt

## 2023-12-01 NOTE — HOSPITAL COURSE
Patient is a 28 year old male with PMH of Alport Syndrome, FMD, HLD, anxiety, depression, left renal artery aneurysm, and renovascular hypertension. He is s/p left renal artery aneurysm repair, and right renal artery bypass with Dr. Kelly on 11/28/23. The patient tolerated the procedure well and he was transferred to the ICU and then regular nursing floor. While on the floor, the patient continued to recover as anticipated. His pain was controlled. He was able to tolerate a regular diet. He was able to void and ambulate without issues. The patient was evaluated by physical therapy, and no further PT needs were recommended following discharge. The patient was discharged on ASA 81 mg. He has an outpatient follow up with vascular surgery with repeat renal artery duplex in 1 month as scheduled.

## 2023-12-01 NOTE — CARE PLAN
The patient's goals for the shift include      The clinical goals for the shift include Pt will sleep a minimum of 4 hours by the end of this shift      Problem: Skin  Goal: Prevent/minimize sheer/friction injuries  Outcome: Progressing  Goal: Promote/optimize nutrition  Outcome: Progressing  Goal: Promote skin healing  Outcome: Progressing     Problem: Pain  Goal: My pain/discomfort is manageable  Outcome: Progressing  Goal: Ability to function at adequate level  Outcome: Progressing     Problem: Safety  Goal: Patient will be injury free during hospitalization  Outcome: Progressing  Goal: I will remain free of falls  Outcome: Progressing     Problem: Psychosocial Needs  Goal: Demonstrates ability to cope with hospitalization/illness  Outcome: Progressing  Goal: Collaborate with me, my family, and caregiver to identify my specific goals  Outcome: Progressing     Problem: Discharge Barriers  Goal: My discharge needs are met  Outcome: Progressing

## 2023-12-02 LAB
ALBUMIN SERPL BCP-MCNC: 3.7 G/DL (ref 3.4–5)
ANION GAP SERPL CALC-SCNC: 12 MMOL/L (ref 10–20)
BLOOD EXPIRATION DATE: NORMAL
BUN SERPL-MCNC: 7 MG/DL (ref 6–23)
CALCIUM SERPL-MCNC: 8.6 MG/DL (ref 8.6–10.6)
CHLORIDE SERPL-SCNC: 102 MMOL/L (ref 98–107)
CO2 SERPL-SCNC: 27 MMOL/L (ref 21–32)
CREAT SERPL-MCNC: 0.68 MG/DL (ref 0.5–1.3)
DISPENSE STATUS: NORMAL
ERYTHROCYTE [DISTWIDTH] IN BLOOD BY AUTOMATED COUNT: 12.5 % (ref 11.5–14.5)
GFR SERPL CREATININE-BSD FRML MDRD: >90 ML/MIN/1.73M*2
GLUCOSE SERPL-MCNC: 96 MG/DL (ref 74–99)
HCT VFR BLD AUTO: 34.1 % (ref 41–52)
HGB BLD-MCNC: 11.4 G/DL (ref 13.5–17.5)
MCH RBC QN AUTO: 29.1 PG (ref 26–34)
MCHC RBC AUTO-ENTMCNC: 33.4 G/DL (ref 32–36)
MCV RBC AUTO: 87 FL (ref 80–100)
NRBC BLD-RTO: 0 /100 WBCS (ref 0–0)
PHOSPHATE SERPL-MCNC: 3.1 MG/DL (ref 2.5–4.9)
PLATELET # BLD AUTO: 212 X10*3/UL (ref 150–450)
POTASSIUM SERPL-SCNC: 3.7 MMOL/L (ref 3.5–5.3)
PRODUCT BLOOD TYPE: 8400
PRODUCT CODE: NORMAL
RBC # BLD AUTO: 3.92 X10*6/UL (ref 4.5–5.9)
SODIUM SERPL-SCNC: 137 MMOL/L (ref 136–145)
UNIT ABO: NORMAL
UNIT NUMBER: NORMAL
UNIT RH: NORMAL
UNIT VOLUME: 350
WBC # BLD AUTO: 6 X10*3/UL (ref 4.4–11.3)
XM INTEP: NORMAL

## 2023-12-02 PROCEDURE — 36415 COLL VENOUS BLD VENIPUNCTURE: CPT

## 2023-12-02 PROCEDURE — 2500000004 HC RX 250 GENERAL PHARMACY W/ HCPCS (ALT 636 FOR OP/ED): Performed by: NURSE PRACTITIONER

## 2023-12-02 PROCEDURE — 2500000004 HC RX 250 GENERAL PHARMACY W/ HCPCS (ALT 636 FOR OP/ED)

## 2023-12-02 PROCEDURE — 2500000005 HC RX 250 GENERAL PHARMACY W/O HCPCS

## 2023-12-02 PROCEDURE — 80069 RENAL FUNCTION PANEL: CPT

## 2023-12-02 PROCEDURE — 1200000002 HC GENERAL ROOM WITH TELEMETRY DAILY

## 2023-12-02 PROCEDURE — 85027 COMPLETE CBC AUTOMATED: CPT

## 2023-12-02 PROCEDURE — 2500000001 HC RX 250 WO HCPCS SELF ADMINISTERED DRUGS (ALT 637 FOR MEDICARE OP)

## 2023-12-02 PROCEDURE — 96372 THER/PROPH/DIAG INJ SC/IM: CPT

## 2023-12-02 PROCEDURE — 2500000002 HC RX 250 W HCPCS SELF ADMINISTERED DRUGS (ALT 637 FOR MEDICARE OP, ALT 636 FOR OP/ED): Performed by: NURSE PRACTITIONER

## 2023-12-02 PROCEDURE — 2500000002 HC RX 250 W HCPCS SELF ADMINISTERED DRUGS (ALT 637 FOR MEDICARE OP, ALT 636 FOR OP/ED)

## 2023-12-02 PROCEDURE — 2500000001 HC RX 250 WO HCPCS SELF ADMINISTERED DRUGS (ALT 637 FOR MEDICARE OP): Performed by: STUDENT IN AN ORGANIZED HEALTH CARE EDUCATION/TRAINING PROGRAM

## 2023-12-02 PROCEDURE — 2500000001 HC RX 250 WO HCPCS SELF ADMINISTERED DRUGS (ALT 637 FOR MEDICARE OP): Performed by: NURSE PRACTITIONER

## 2023-12-02 RX ORDER — ONDANSETRON 4 MG/1
4 TABLET, FILM COATED ORAL ONCE
Status: COMPLETED | OUTPATIENT
Start: 2023-12-02 | End: 2023-12-02

## 2023-12-02 RX ORDER — HYDROMORPHONE HYDROCHLORIDE 1 MG/ML
0.2 INJECTION, SOLUTION INTRAMUSCULAR; INTRAVENOUS; SUBCUTANEOUS ONCE
Status: COMPLETED | OUTPATIENT
Start: 2023-12-02 | End: 2023-12-02

## 2023-12-02 RX ORDER — ONDANSETRON HYDROCHLORIDE 2 MG/ML
4 INJECTION, SOLUTION INTRAVENOUS EVERY 4 HOURS PRN
Status: DISCONTINUED | OUTPATIENT
Start: 2023-12-02 | End: 2023-12-03

## 2023-12-02 RX ADMIN — OXYCODONE HYDROCHLORIDE 5 MG: 5 TABLET ORAL at 15:50

## 2023-12-02 RX ADMIN — PANTOPRAZOLE SODIUM 40 MG: 40 TABLET, DELAYED RELEASE ORAL at 09:36

## 2023-12-02 RX ADMIN — STANDARDIZED SENNA CONCENTRATE 8.6 MG: 8.6 TABLET ORAL at 09:36

## 2023-12-02 RX ADMIN — ACETAMINOPHEN 650 MG: 325 TABLET ORAL at 09:37

## 2023-12-02 RX ADMIN — FLUTICASONE PROPIONATE 2 SPRAY: 50 SPRAY, METERED NASAL at 09:35

## 2023-12-02 RX ADMIN — OXYCODONE HYDROCHLORIDE 5 MG: 5 TABLET ORAL at 02:35

## 2023-12-02 RX ADMIN — ACETAMINOPHEN 650 MG: 325 TABLET ORAL at 02:35

## 2023-12-02 RX ADMIN — ACETAMINOPHEN 650 MG: 325 TABLET ORAL at 14:19

## 2023-12-02 RX ADMIN — METOPROLOL TARTRATE 12.5 MG: 25 TABLET, FILM COATED ORAL at 20:49

## 2023-12-02 RX ADMIN — DOCUSATE SODIUM 100 MG: 100 CAPSULE, LIQUID FILLED ORAL at 09:36

## 2023-12-02 RX ADMIN — METOPROLOL TARTRATE 12.5 MG: 25 TABLET, FILM COATED ORAL at 09:38

## 2023-12-02 RX ADMIN — ONDANSETRON HYDROCHLORIDE 4 MG: 4 TABLET, FILM COATED ORAL at 19:22

## 2023-12-02 RX ADMIN — ONDANSETRON 4 MG: 4 TABLET, ORALLY DISINTEGRATING ORAL at 00:11

## 2023-12-02 RX ADMIN — HEPARIN SODIUM 5000 UNITS: 5000 INJECTION INTRAVENOUS; SUBCUTANEOUS at 00:11

## 2023-12-02 RX ADMIN — DOCUSATE SODIUM 100 MG: 100 CAPSULE, LIQUID FILLED ORAL at 20:50

## 2023-12-02 RX ADMIN — STANDARDIZED SENNA CONCENTRATE 8.6 MG: 8.6 TABLET ORAL at 20:49

## 2023-12-02 RX ADMIN — ACETAMINOPHEN 650 MG: 325 TABLET ORAL at 20:49

## 2023-12-02 RX ADMIN — HEPARIN SODIUM 5000 UNITS: 5000 INJECTION INTRAVENOUS; SUBCUTANEOUS at 09:35

## 2023-12-02 RX ADMIN — ASPIRIN 81 MG CHEWABLE TABLET 81 MG: 81 TABLET CHEWABLE at 09:38

## 2023-12-02 RX ADMIN — HEPARIN SODIUM 5000 UNITS: 5000 INJECTION INTRAVENOUS; SUBCUTANEOUS at 14:18

## 2023-12-02 RX ADMIN — OXYCODONE HYDROCHLORIDE 10 MG: 5 TABLET ORAL at 21:08

## 2023-12-02 RX ADMIN — OXYCODONE HYDROCHLORIDE 5 MG: 5 TABLET ORAL at 09:37

## 2023-12-02 RX ADMIN — HYDROMORPHONE HYDROCHLORIDE 0.2 MG: 1 INJECTION, SOLUTION INTRAMUSCULAR; INTRAVENOUS; SUBCUTANEOUS at 17:58

## 2023-12-02 RX ADMIN — DIAZEPAM 5 MG: 5 TABLET ORAL at 10:59

## 2023-12-02 ASSESSMENT — COGNITIVE AND FUNCTIONAL STATUS - GENERAL
DAILY ACTIVITIY SCORE: 24
DAILY ACTIVITIY SCORE: 24
MOBILITY SCORE: 24
MOBILITY SCORE: 24

## 2023-12-02 ASSESSMENT — PAIN DESCRIPTION - LOCATION
LOCATION: ABDOMEN

## 2023-12-02 ASSESSMENT — PAIN SCALES - GENERAL
PAINLEVEL_OUTOF10: 2
PAINLEVEL_OUTOF10: 4
PAINLEVEL_OUTOF10: 3
PAINLEVEL_OUTOF10: 5 - MODERATE PAIN
PAINLEVEL_OUTOF10: 7
PAINLEVEL_OUTOF10: 5 - MODERATE PAIN
PAINLEVEL_OUTOF10: 4
PAINLEVEL_OUTOF10: 7
PAINLEVEL_OUTOF10: 2

## 2023-12-02 ASSESSMENT — PAIN - FUNCTIONAL ASSESSMENT
PAIN_FUNCTIONAL_ASSESSMENT: 0-10

## 2023-12-02 ASSESSMENT — PAIN DESCRIPTION - ORIENTATION
ORIENTATION: MID

## 2023-12-02 NOTE — CARE PLAN
I spoke with Andre who states the stomach bug is going around his house.  PDL rescheduled to 3/18/21.  Mariama Najera RN     The patient's goals for the shift include      The clinical goals for the shift include pt will sleep at least four hours by end of shift      Problem: Skin  Goal: Prevent/minimize sheer/friction injuries  Outcome: Progressing  Goal: Promote/optimize nutrition  Outcome: Progressing  Goal: Promote skin healing  Outcome: Progressing     Problem: Pain  Goal: My pain/discomfort is manageable  Outcome: Progressing  Goal: Ability to function at adequate level  Outcome: Progressing     Problem: Safety  Goal: Patient will be injury free during hospitalization  Outcome: Progressing  Goal: I will remain free of falls  Outcome: Progressing     Problem: Psychosocial Needs  Goal: Demonstrates ability to cope with hospitalization/illness  Outcome: Progressing  Goal: Collaborate with me, my family, and caregiver to identify my specific goals  Outcome: Progressing  Flowsheets (Taken 12/1/2023 2000)  Cultural Requests During Hospitalization: none  Spiritual Requests During Hospitalization: none     Problem: Discharge Barriers  Goal: My discharge needs are met  Outcome: Progressing

## 2023-12-02 NOTE — PROGRESS NOTES
VASCULAR SURGERY PROGRESS NOTE  Subjective   + flatus, no bowel movement. Denies fevers/chills malaise, vomiting. Had some nausea overnight.     Objective   Vitals:    12/02/23 0900   BP: 133/83   Pulse: 71   Resp: 20   Temp: 36.8 °C (98.2 °F)   SpO2: 100%      Exam:  Constitutional: No acute distress, resting comfortably  Neuro:  AOx3, grossly intact  ENMT: moist mucous membranes  CV: sinus tachycardia on monitor   Pulm: non-labored on room air  GI: soft, appropriately tender, non-distended  Skin: midline incision intact  Musculoskeletal: moving all extremities  Extremities: palpable bilateral PT/DP pulses    Relevant Results  Medications:  Scheduled Meds:acetaminophen, 650 mg, oral, q6h  aspirin, 81 mg, oral, Daily  docusate sodium, 100 mg, oral, BID  fluticasone, 2 spray, Each Nostril, Daily  heparin, 5,000 Units, subcutaneous, q8h  hydrOXYzine HCL, 25 mg, oral, Daily  metoprolol tartrate, 12.5 mg, oral, BID  pantoprazole, 40 mg, oral, Daily before breakfast  sennosides, 1 tablet, oral, BID      Continuous Infusions:     PRN Meds:.PRN medications: diazePAM, oxyCODONE, oxyCODONE, oxygen, polyethylene glycol    Labs:  Results from last 7 days   Lab Units 12/02/23  0702 11/30/23  1155 11/29/23  1151   WBC AUTO x10*3/uL 6.0 10.0 10.7   HEMOGLOBIN g/dL 11.4* 12.7* 11.9*   PLATELETS AUTO x10*3/uL 212 176 185        Results from last 7 days   Lab Units 12/02/23  0702 11/30/23  1155 11/29/23  1151   SODIUM mmol/L 137 135* 136   POTASSIUM mmol/L 3.7 3.7 4.1   CHLORIDE mmol/L 102 99 102   CO2 mmol/L 27 29 27   BUN mg/dL 7 7 11   CREATININE mg/dL 0.68 0.85 0.93   GLUCOSE mg/dL 96 96 106*   MAGNESIUM mg/dL  --  1.85 1.84   PHOSPHORUS mg/dL 3.1 1.7* 2.9        Results from last 7 days   Lab Units 11/29/23  1151 11/29/23  0423 11/28/23  1709   INR  1.3* 1.2* 1.1   PROTIME seconds 14.3* 13.7* 12.9*   APTT seconds 24* 26* 25*       Assessment/Plan   28 y.o. male with history of Alport Syndrome, FMD, HLD, anxiety, depression,  left renal artery aneurysm, renovascular hypertension who is s/p left renal artery aneurysm repair, right renal artery bypass.     Patient recovering appropriately. [facility] medicine recommended discharge on metoprolol succinate 25 mg vs lisinopril 2.5 mg upon discharge. Patient is pending discharge to home likely tomorrow.     Neuro: acute postoperative pain, anxiety, depression   - continue oral pain meds   - continue neurovascular checks every 4 hrs  - continue PRN valium   - continue atarax for anxiety    CV: Alport syndrome, FMD, HLD, renovascular hypertension, renal artery aneurysm   - maintain blood pressure control with metoprolol   - continue ASA    Pulm: oxygen desaturation   - continue to encourage IS hourly while awake  - OOB to chair and increase ambulation as tolerated    GI:  renovascular hypertension, renal artery aneurysm s/p repair, hypoalbuminemia, hypokalemia   -Continue regular diet  - continue PPI  - continue bowel regimen to prevent OIC   - replete electrolytes as needed to maintain K>4, Mg>2   - strict I&Os  - RFP daily     Endo:    - no issues     Heme:   - no s/sx of bleeding  - CBC as clinically indicated    ID:   -Afebrile   -No leukocytosis   -trend temp q4  -CBC daily     MSK:  - frequent ambulation as tolerated     Prophylaxis:  - SQH/SCDs    Dispo:   - continue care on regular nursing floor  -Possible discharge over the weekend    D/W with attending Dr. Choco Stern MD  General Surgery PGY1  [id number]

## 2023-12-03 PROCEDURE — 2500000002 HC RX 250 W HCPCS SELF ADMINISTERED DRUGS (ALT 637 FOR MEDICARE OP, ALT 636 FOR OP/ED)

## 2023-12-03 PROCEDURE — 2500000005 HC RX 250 GENERAL PHARMACY W/O HCPCS

## 2023-12-03 PROCEDURE — 2500000004 HC RX 250 GENERAL PHARMACY W/ HCPCS (ALT 636 FOR OP/ED)

## 2023-12-03 PROCEDURE — 2500000001 HC RX 250 WO HCPCS SELF ADMINISTERED DRUGS (ALT 637 FOR MEDICARE OP)

## 2023-12-03 PROCEDURE — 96372 THER/PROPH/DIAG INJ SC/IM: CPT

## 2023-12-03 PROCEDURE — 2500000002 HC RX 250 W HCPCS SELF ADMINISTERED DRUGS (ALT 637 FOR MEDICARE OP, ALT 636 FOR OP/ED): Performed by: NURSE PRACTITIONER

## 2023-12-03 PROCEDURE — 2500000004 HC RX 250 GENERAL PHARMACY W/ HCPCS (ALT 636 FOR OP/ED): Performed by: NURSE PRACTITIONER

## 2023-12-03 PROCEDURE — 1200000002 HC GENERAL ROOM WITH TELEMETRY DAILY

## 2023-12-03 PROCEDURE — 2500000001 HC RX 250 WO HCPCS SELF ADMINISTERED DRUGS (ALT 637 FOR MEDICARE OP): Performed by: NURSE PRACTITIONER

## 2023-12-03 PROCEDURE — 2500000001 HC RX 250 WO HCPCS SELF ADMINISTERED DRUGS (ALT 637 FOR MEDICARE OP): Performed by: STUDENT IN AN ORGANIZED HEALTH CARE EDUCATION/TRAINING PROGRAM

## 2023-12-03 RX ORDER — POLYETHYLENE GLYCOL 3350 17 G/17G
17 POWDER, FOR SOLUTION ORAL DAILY
Status: DISCONTINUED | OUTPATIENT
Start: 2023-12-03 | End: 2023-12-05 | Stop reason: HOSPADM

## 2023-12-03 RX ORDER — ONDANSETRON HYDROCHLORIDE 2 MG/ML
4 INJECTION, SOLUTION INTRAVENOUS EVERY 8 HOURS PRN
Status: DISCONTINUED | OUTPATIENT
Start: 2023-12-03 | End: 2023-12-05 | Stop reason: HOSPADM

## 2023-12-03 RX ORDER — BISACODYL 10 MG/1
10 SUPPOSITORY RECTAL ONCE
Status: DISCONTINUED | OUTPATIENT
Start: 2023-12-03 | End: 2023-12-05 | Stop reason: HOSPADM

## 2023-12-03 RX ORDER — BISACODYL 10 MG/1
10 SUPPOSITORY RECTAL DAILY
Status: DISCONTINUED | OUTPATIENT
Start: 2023-12-03 | End: 2023-12-05 | Stop reason: HOSPADM

## 2023-12-03 RX ORDER — ONDANSETRON 4 MG/1
4 TABLET, ORALLY DISINTEGRATING ORAL EVERY 8 HOURS PRN
Status: DISCONTINUED | OUTPATIENT
Start: 2023-12-03 | End: 2023-12-05 | Stop reason: HOSPADM

## 2023-12-03 RX ADMIN — HEPARIN SODIUM 5000 UNITS: 5000 INJECTION INTRAVENOUS; SUBCUTANEOUS at 14:09

## 2023-12-03 RX ADMIN — METOPROLOL TARTRATE 12.5 MG: 25 TABLET, FILM COATED ORAL at 08:47

## 2023-12-03 RX ADMIN — OXYCODONE HYDROCHLORIDE 5 MG: 5 TABLET ORAL at 14:39

## 2023-12-03 RX ADMIN — OXYCODONE HYDROCHLORIDE 10 MG: 5 TABLET ORAL at 22:17

## 2023-12-03 RX ADMIN — HEPARIN SODIUM 5000 UNITS: 5000 INJECTION INTRAVENOUS; SUBCUTANEOUS at 22:18

## 2023-12-03 RX ADMIN — DIAZEPAM 5 MG: 5 TABLET ORAL at 01:42

## 2023-12-03 RX ADMIN — POLYETHYLENE GLYCOL 3350 17 G: 17 POWDER, FOR SOLUTION ORAL at 08:46

## 2023-12-03 RX ADMIN — OXYCODONE HYDROCHLORIDE 10 MG: 5 TABLET ORAL at 18:30

## 2023-12-03 RX ADMIN — ACETAMINOPHEN 650 MG: 325 TABLET ORAL at 14:08

## 2023-12-03 RX ADMIN — OXYCODONE HYDROCHLORIDE 5 MG: 5 TABLET ORAL at 10:35

## 2023-12-03 RX ADMIN — DOCUSATE SODIUM 100 MG: 100 CAPSULE, LIQUID FILLED ORAL at 21:32

## 2023-12-03 RX ADMIN — ASPIRIN 81 MG CHEWABLE TABLET 81 MG: 81 TABLET CHEWABLE at 08:46

## 2023-12-03 RX ADMIN — ACETAMINOPHEN 650 MG: 325 TABLET ORAL at 08:46

## 2023-12-03 RX ADMIN — ACETAMINOPHEN 650 MG: 325 TABLET ORAL at 01:24

## 2023-12-03 RX ADMIN — FLUTICASONE PROPIONATE 2 SPRAY: 50 SPRAY, METERED NASAL at 08:47

## 2023-12-03 RX ADMIN — HEPARIN SODIUM 5000 UNITS: 5000 INJECTION INTRAVENOUS; SUBCUTANEOUS at 08:46

## 2023-12-03 RX ADMIN — DOCUSATE SODIUM 100 MG: 100 CAPSULE, LIQUID FILLED ORAL at 08:46

## 2023-12-03 RX ADMIN — DIAZEPAM 5 MG: 5 TABLET ORAL at 22:17

## 2023-12-03 RX ADMIN — OXYCODONE HYDROCHLORIDE 5 MG: 5 TABLET ORAL at 06:10

## 2023-12-03 RX ADMIN — PANTOPRAZOLE SODIUM 40 MG: 40 TABLET, DELAYED RELEASE ORAL at 06:10

## 2023-12-03 RX ADMIN — ACETAMINOPHEN 650 MG: 325 TABLET ORAL at 21:28

## 2023-12-03 RX ADMIN — STANDARDIZED SENNA CONCENTRATE 8.6 MG: 8.6 TABLET ORAL at 08:46

## 2023-12-03 RX ADMIN — METOPROLOL TARTRATE 12.5 MG: 25 TABLET, FILM COATED ORAL at 21:29

## 2023-12-03 RX ADMIN — DIAZEPAM 5 MG: 5 TABLET ORAL at 10:36

## 2023-12-03 RX ADMIN — ONDANSETRON 4 MG: 4 TABLET, ORALLY DISINTEGRATING ORAL at 20:31

## 2023-12-03 RX ADMIN — BISACODYL 10 MG: 10 SUPPOSITORY RECTAL at 08:45

## 2023-12-03 RX ADMIN — OXYCODONE HYDROCHLORIDE 10 MG: 5 TABLET ORAL at 01:24

## 2023-12-03 RX ADMIN — STANDARDIZED SENNA CONCENTRATE 8.6 MG: 8.6 TABLET ORAL at 21:32

## 2023-12-03 RX ADMIN — HEPARIN SODIUM 5000 UNITS: 5000 INJECTION INTRAVENOUS; SUBCUTANEOUS at 01:23

## 2023-12-03 RX ADMIN — ONDANSETRON 4 MG: 2 INJECTION INTRAMUSCULAR; INTRAVENOUS at 15:51

## 2023-12-03 ASSESSMENT — COGNITIVE AND FUNCTIONAL STATUS - GENERAL
MOBILITY SCORE: 24
DAILY ACTIVITIY SCORE: 24
MOBILITY SCORE: 24
DAILY ACTIVITIY SCORE: 24

## 2023-12-03 ASSESSMENT — PAIN SCALES - GENERAL
PAINLEVEL_OUTOF10: 3
PAINLEVEL_OUTOF10: 5 - MODERATE PAIN
PAINLEVEL_OUTOF10: 2
PAINLEVEL_OUTOF10: 2
PAINLEVEL_OUTOF10: 4
PAINLEVEL_OUTOF10: 0 - NO PAIN
PAINLEVEL_OUTOF10: 6
PAINLEVEL_OUTOF10: 5 - MODERATE PAIN
PAINLEVEL_OUTOF10: 8
PAINLEVEL_OUTOF10: 7
PAINLEVEL_OUTOF10: 2
PAINLEVEL_OUTOF10: 8
PAINLEVEL_OUTOF10: 3

## 2023-12-03 ASSESSMENT — PAIN DESCRIPTION - ORIENTATION
ORIENTATION: MID
ORIENTATION: MID

## 2023-12-03 ASSESSMENT — PAIN - FUNCTIONAL ASSESSMENT
PAIN_FUNCTIONAL_ASSESSMENT: 0-10

## 2023-12-03 ASSESSMENT — PAIN DESCRIPTION - LOCATION
LOCATION: ABDOMEN
LOCATION: ABDOMEN

## 2023-12-03 NOTE — PROGRESS NOTES
VASCULAR SURGERY PROGRESS NOTE  Subjective   Episode of 150cc of green emesis preceded by nausea overnight. No bowel movements.    Objective   Vitals:    12/03/23 0600   BP: 113/72   Pulse: 85   Resp: 17   Temp:    SpO2: 99%      Exam:  Constitutional: No acute distress, resting comfortably  Neuro:  AOx3, grossly intact  ENMT: moist mucous membranes  CV: sinus tachycardia on monitor   Pulm: non-labored on room air  GI: soft, appropriately tender, non-distended  Skin: midline incision intact  Musculoskeletal: moving all extremities  Extremities: palpable bilateral PT/DP pulses    Relevant Results    Labs:  Results from last 7 days   Lab Units 12/02/23  0702 11/30/23  1155 11/29/23  1151   WBC AUTO x10*3/uL 6.0 10.0 10.7   HEMOGLOBIN g/dL 11.4* 12.7* 11.9*   PLATELETS AUTO x10*3/uL 212 176 185        Results from last 7 days   Lab Units 12/02/23  0702 11/30/23  1155 11/29/23  1151   SODIUM mmol/L 137 135* 136   POTASSIUM mmol/L 3.7 3.7 4.1   CHLORIDE mmol/L 102 99 102   CO2 mmol/L 27 29 27   BUN mg/dL 7 7 11   CREATININE mg/dL 0.68 0.85 0.93   GLUCOSE mg/dL 96 96 106*   MAGNESIUM mg/dL  --  1.85 1.84   PHOSPHORUS mg/dL 3.1 1.7* 2.9        Results from last 7 days   Lab Units 11/29/23  1151 11/29/23  0423 11/28/23  1709   INR  1.3* 1.2* 1.1   PROTIME seconds 14.3* 13.7* 12.9*   APTT seconds 24* 26* 25*       Assessment/Plan   28 y.o. male with history of Alport Syndrome, FMD, HLD, anxiety, depression, left renal artery aneurysm, renovascular hypertension who is s/p left renal artery aneurysm repair, right renal artery bypass.     [facility] medicine recommended discharge on metoprolol succinate 25 mg vs lisinopril 2.5 mg upon discharge. Patient is pending discharge to home possibly later today pending diet tolerance. Will add suppository.    Neuro: acute postoperative pain, anxiety, depression   - continue oral pain meds   - continue neurovascular checks every 4 hrs  - continue PRN valium   - continue atarax for  anxiety    CV: Alport syndrome, FMD, HLD, renovascular hypertension, renal artery aneurysm   - maintain blood pressure control with metoprolol   - continue ASA    Pulm: oxygen desaturation   - continue to encourage IS hourly while awake  - OOB to chair and increase ambulation as tolerated    GI:  renovascular hypertension, renal artery aneurysm s/p repair, hypoalbuminemia, hypokalemia   -Continue regular diet  - continue PPI  - continue bowel regimen to prevent OIC   - replete electrolytes as needed to maintain K>4, Mg>2   - strict I&Os  - RFP daily     Endo:    - no issues     Heme:   - no s/sx of bleeding  - CBC as clinically indicated    ID:   -Afebrile   -No leukocytosis   -trend temp q4  -CBC daily     MSK:  - frequent ambulation as tolerated     Prophylaxis:  - SQH/SCDs    Dispo:   - continue care on regular nursing floor  -Possible discharge over the weekend    Frantz Dela Cruz MD  General Surgery, pgy3  Vascular [id number]

## 2023-12-03 NOTE — SIGNIFICANT EVENT
N/V    Was paged at 0129 for an episode of 150 mL of emesis. Patient states he vomited the green juice he drank earlier and felt immediately better afterwards. No current complaints of nausea. Denies abdominal pain. Is passing regular flatus but has some hiccupping and burping. Has not had a bowel movement since his surgery.     Vitals:    12/02/23 1936   BP: 115/72   Pulse: 70   Resp: 18   Temp: 36.7 °C (98.1 °F)   SpO2: 100%     Physical Exam:  General: laying in bed, comfortable  Cardiac: regular rate  Pulm: nonlabored on room air  GI: soft, appropriately tender (slightly worse in LUQ), nondistended; midline incision intact    A/P:  28 y.o. male with history of Alport Syndrome, FMD, HLD, anxiety, depression, left renal artery aneurysm, renovascular hypertension who is s/p left renal artery aneurysm repair, right renal artery bypass.      Patient is well appearing and denies current nausea. Added Zofran as needed.    Caitlyn Cervantes MD  Vascular Surgery  [phone]

## 2023-12-03 NOTE — CARE PLAN
The patient's goals for the shift include      The clinical goals for the shift include pt will sleep at least four hours by end of shift      Problem: Skin  Goal: Prevent/minimize sheer/friction injuries  Outcome: Progressing  Goal: Promote/optimize nutrition  Outcome: Progressing  Goal: Promote skin healing  Outcome: Progressing     Problem: Pain  Goal: My pain/discomfort is manageable  Outcome: Progressing  Goal: Ability to function at adequate level  Outcome: Progressing     Problem: Safety  Goal: Patient will be injury free during hospitalization  Outcome: Progressing  Goal: I will remain free of falls  Outcome: Progressing     Problem: Psychosocial Needs  Goal: Demonstrates ability to cope with hospitalization/illness  Outcome: Progressing  Goal: Collaborate with me, my family, and caregiver to identify my specific goals  Outcome: Progressing     Problem: Discharge Barriers  Goal: My discharge needs are met  Outcome: Progressing

## 2023-12-04 ENCOUNTER — PHARMACY VISIT (OUTPATIENT)
Dept: PHARMACY | Facility: CLINIC | Age: 28
End: 2023-12-04
Payer: COMMERCIAL

## 2023-12-04 LAB
ALBUMIN SERPL BCP-MCNC: 3.8 G/DL (ref 3.4–5)
ANION GAP SERPL CALC-SCNC: 18 MMOL/L (ref 10–20)
BUN SERPL-MCNC: 11 MG/DL (ref 6–23)
CALCIUM SERPL-MCNC: 9 MG/DL (ref 8.6–10.6)
CHLORIDE SERPL-SCNC: 98 MMOL/L (ref 98–107)
CO2 SERPL-SCNC: 24 MMOL/L (ref 21–32)
CREAT SERPL-MCNC: 0.86 MG/DL (ref 0.5–1.3)
ERYTHROCYTE [DISTWIDTH] IN BLOOD BY AUTOMATED COUNT: 12.6 % (ref 11.5–14.5)
GFR SERPL CREATININE-BSD FRML MDRD: >90 ML/MIN/1.73M*2
GLUCOSE SERPL-MCNC: 91 MG/DL (ref 74–99)
HCT VFR BLD AUTO: 36.3 % (ref 41–52)
HGB BLD-MCNC: 11.8 G/DL (ref 13.5–17.5)
LABORATORY COMMENT REPORT: NORMAL
MAGNESIUM SERPL-MCNC: 1.95 MG/DL (ref 1.6–2.4)
MCH RBC QN AUTO: 28.4 PG (ref 26–34)
MCHC RBC AUTO-ENTMCNC: 32.5 G/DL (ref 32–36)
MCV RBC AUTO: 88 FL (ref 80–100)
NRBC BLD-RTO: 0 /100 WBCS (ref 0–0)
PATH REPORT.FINAL DX SPEC: NORMAL
PATH REPORT.GROSS SPEC: NORMAL
PATH REPORT.RELEVANT HX SPEC: NORMAL
PATH REPORT.TOTAL CANCER: NORMAL
PHOSPHATE SERPL-MCNC: 4.1 MG/DL (ref 2.5–4.9)
PLATELET # BLD AUTO: 283 X10*3/UL (ref 150–450)
POTASSIUM SERPL-SCNC: 3.9 MMOL/L (ref 3.5–5.3)
RBC # BLD AUTO: 4.15 X10*6/UL (ref 4.5–5.9)
SODIUM SERPL-SCNC: 136 MMOL/L (ref 136–145)
WBC # BLD AUTO: 5.5 X10*3/UL (ref 4.4–11.3)

## 2023-12-04 PROCEDURE — 83735 ASSAY OF MAGNESIUM: CPT

## 2023-12-04 PROCEDURE — 96372 THER/PROPH/DIAG INJ SC/IM: CPT

## 2023-12-04 PROCEDURE — 2500000002 HC RX 250 W HCPCS SELF ADMINISTERED DRUGS (ALT 637 FOR MEDICARE OP, ALT 636 FOR OP/ED): Performed by: NURSE PRACTITIONER

## 2023-12-04 PROCEDURE — 2500000001 HC RX 250 WO HCPCS SELF ADMINISTERED DRUGS (ALT 637 FOR MEDICARE OP): Performed by: NURSE PRACTITIONER

## 2023-12-04 PROCEDURE — 85027 COMPLETE CBC AUTOMATED: CPT

## 2023-12-04 PROCEDURE — 2500000001 HC RX 250 WO HCPCS SELF ADMINISTERED DRUGS (ALT 637 FOR MEDICARE OP)

## 2023-12-04 PROCEDURE — 99233 SBSQ HOSP IP/OBS HIGH 50: CPT | Performed by: NURSE PRACTITIONER

## 2023-12-04 PROCEDURE — 2500000005 HC RX 250 GENERAL PHARMACY W/O HCPCS: Performed by: NURSE PRACTITIONER

## 2023-12-04 PROCEDURE — 36415 COLL VENOUS BLD VENIPUNCTURE: CPT

## 2023-12-04 PROCEDURE — 2500000001 HC RX 250 WO HCPCS SELF ADMINISTERED DRUGS (ALT 637 FOR MEDICARE OP): Performed by: STUDENT IN AN ORGANIZED HEALTH CARE EDUCATION/TRAINING PROGRAM

## 2023-12-04 PROCEDURE — RXMED WILLOW AMBULATORY MEDICATION CHARGE

## 2023-12-04 PROCEDURE — 2500000005 HC RX 250 GENERAL PHARMACY W/O HCPCS

## 2023-12-04 PROCEDURE — 2500000004 HC RX 250 GENERAL PHARMACY W/ HCPCS (ALT 636 FOR OP/ED)

## 2023-12-04 PROCEDURE — 1200000002 HC GENERAL ROOM WITH TELEMETRY DAILY

## 2023-12-04 PROCEDURE — 80069 RENAL FUNCTION PANEL: CPT

## 2023-12-04 PROCEDURE — 2500000004 HC RX 250 GENERAL PHARMACY W/ HCPCS (ALT 636 FOR OP/ED): Performed by: NURSE PRACTITIONER

## 2023-12-04 RX ORDER — LISINOPRIL 2.5 MG/1
2.5 TABLET ORAL DAILY
Qty: 30 TABLET | Refills: 0 | Status: SHIPPED | OUTPATIENT
Start: 2023-12-04 | End: 2023-12-11 | Stop reason: SDUPTHER

## 2023-12-04 RX ORDER — LIDOCAINE 560 MG/1
1 PATCH PERCUTANEOUS; TOPICAL; TRANSDERMAL DAILY
Status: DISCONTINUED | OUTPATIENT
Start: 2023-12-04 | End: 2023-12-05 | Stop reason: HOSPADM

## 2023-12-04 RX ORDER — LIDOCAINE 560 MG/1
1 PATCH PERCUTANEOUS; TOPICAL; TRANSDERMAL DAILY
Qty: 10 PATCH | Refills: 0 | Status: SHIPPED | OUTPATIENT
Start: 2023-12-05 | End: 2023-12-15

## 2023-12-04 RX ORDER — SENNOSIDES 8.6 MG/1
1 TABLET ORAL 2 TIMES DAILY PRN
Qty: 28 TABLET | Refills: 0 | Status: SHIPPED | OUTPATIENT
Start: 2023-12-04 | End: 2023-12-19

## 2023-12-04 RX ORDER — METOPROLOL SUCCINATE 25 MG/1
25 TABLET, EXTENDED RELEASE ORAL DAILY
Qty: 30 TABLET | Refills: 0 | Status: SHIPPED | OUTPATIENT
Start: 2023-12-04 | End: 2023-12-27 | Stop reason: ALTCHOICE

## 2023-12-04 RX ADMIN — ACETAMINOPHEN 650 MG: 325 TABLET ORAL at 02:25

## 2023-12-04 RX ADMIN — ACETAMINOPHEN 650 MG: 325 TABLET ORAL at 08:22

## 2023-12-04 RX ADMIN — ACETAMINOPHEN 650 MG: 325 TABLET ORAL at 14:34

## 2023-12-04 RX ADMIN — OXYCODONE HYDROCHLORIDE 10 MG: 5 TABLET ORAL at 16:03

## 2023-12-04 RX ADMIN — LIDOCAINE 1 PATCH: 4 PATCH TOPICAL at 11:14

## 2023-12-04 RX ADMIN — DOCUSATE SODIUM 100 MG: 100 CAPSULE, LIQUID FILLED ORAL at 08:22

## 2023-12-04 RX ADMIN — HEPARIN SODIUM 5000 UNITS: 5000 INJECTION INTRAVENOUS; SUBCUTANEOUS at 14:34

## 2023-12-04 RX ADMIN — STANDARDIZED SENNA CONCENTRATE 8.6 MG: 8.6 TABLET ORAL at 08:22

## 2023-12-04 RX ADMIN — DIAZEPAM 5 MG: 5 TABLET ORAL at 20:30

## 2023-12-04 RX ADMIN — DOCUSATE SODIUM 100 MG: 100 CAPSULE, LIQUID FILLED ORAL at 20:12

## 2023-12-04 RX ADMIN — FLUTICASONE PROPIONATE 2 SPRAY: 50 SPRAY, METERED NASAL at 08:26

## 2023-12-04 RX ADMIN — ACETAMINOPHEN 650 MG: 325 TABLET ORAL at 20:05

## 2023-12-04 RX ADMIN — BISACODYL 10 MG: 10 SUPPOSITORY RECTAL at 11:14

## 2023-12-04 RX ADMIN — METOPROLOL TARTRATE 12.5 MG: 25 TABLET, FILM COATED ORAL at 08:22

## 2023-12-04 RX ADMIN — PANTOPRAZOLE SODIUM 40 MG: 40 TABLET, DELAYED RELEASE ORAL at 06:06

## 2023-12-04 RX ADMIN — METOPROLOL TARTRATE 12.5 MG: 25 TABLET, FILM COATED ORAL at 20:12

## 2023-12-04 RX ADMIN — OXYCODONE HYDROCHLORIDE 5 MG: 5 TABLET ORAL at 08:22

## 2023-12-04 RX ADMIN — Medication: at 11:17

## 2023-12-04 RX ADMIN — OXYCODONE HYDROCHLORIDE 10 MG: 5 TABLET ORAL at 11:14

## 2023-12-04 RX ADMIN — OXYCODONE HYDROCHLORIDE 10 MG: 5 TABLET ORAL at 20:05

## 2023-12-04 RX ADMIN — OXYCODONE HYDROCHLORIDE 10 MG: 5 TABLET ORAL at 02:25

## 2023-12-04 RX ADMIN — ONDANSETRON 4 MG: 4 TABLET, ORALLY DISINTEGRATING ORAL at 20:07

## 2023-12-04 RX ADMIN — ASPIRIN 81 MG CHEWABLE TABLET 81 MG: 81 TABLET CHEWABLE at 09:00

## 2023-12-04 RX ADMIN — HEPARIN SODIUM 5000 UNITS: 5000 INJECTION INTRAVENOUS; SUBCUTANEOUS at 06:06

## 2023-12-04 RX ADMIN — POLYETHYLENE GLYCOL 3350 17 G: 17 POWDER, FOR SOLUTION ORAL at 11:14

## 2023-12-04 RX ADMIN — STANDARDIZED SENNA CONCENTRATE 8.6 MG: 8.6 TABLET ORAL at 20:12

## 2023-12-04 ASSESSMENT — COGNITIVE AND FUNCTIONAL STATUS - GENERAL
MOBILITY SCORE: 23
CLIMB 3 TO 5 STEPS WITH RAILING: A LITTLE
DAILY ACTIVITIY SCORE: 24
DAILY ACTIVITIY SCORE: 24
MOBILITY SCORE: 23
CLIMB 3 TO 5 STEPS WITH RAILING: A LITTLE

## 2023-12-04 ASSESSMENT — PAIN - FUNCTIONAL ASSESSMENT
PAIN_FUNCTIONAL_ASSESSMENT: 0-10
PAIN_FUNCTIONAL_ASSESSMENT: 0-10

## 2023-12-04 ASSESSMENT — PAIN SCALES - GENERAL
PAINLEVEL_OUTOF10: 0 - NO PAIN
PAINLEVEL_OUTOF10: 0 - NO PAIN
PAINLEVEL_OUTOF10: 8
PAINLEVEL_OUTOF10: 8

## 2023-12-04 NOTE — PROGRESS NOTES
Physical Therapy                 Therapy Communication Note    Patient Name: Patrick Mckeon  MRN: [id number]  Today's Date: 12/4/2023     Discipline: Physical Therapy    Missed Visit Reason: Missed Visit Reason: Patient refused (Pt supine in bed upon arrival and reported he already walked and does not want to participate.)    Missed Time: Attempt

## 2023-12-04 NOTE — PROGRESS NOTES
VASCULAR SURGERY PROGRESS NOTE  Subjective   No nausea with breakfast. Mild incisional pain. No BM yet.     Objective   Vitals:    12/04/23 0804   BP: 112/68   Pulse: 69   Resp: 16   Temp: 36.2 °C (97.2 °F)   SpO2: 99%      Exam:  Constitutional: No acute distress, resting comfortably  Neuro:  AOx3, grossly intact  ENMT: moist mucous membranes  CV: sinus tachycardia on monitor   Pulm: non-labored on room air  GI: soft, appropriately tender, non-distended  Skin: midline incision intact  Musculoskeletal: moving all extremities  Extremities: palpable bilateral PT/DP pulses    Relevant Results  Medications:  Scheduled Meds:  acetaminophen, 650 mg, oral, q6h  aspirin, 81 mg, oral, Daily  bisacodyl, 10 mg, rectal, Once  bisacodyl, 10 mg, rectal, Daily  docusate sodium, 100 mg, oral, BID  fluticasone, 2 spray, Each Nostril, Daily  heparin, 5,000 Units, subcutaneous, q8h  hydrOXYzine HCL, 25 mg, oral, Daily  lidocaine, 1 patch, transdermal, Daily  metoprolol tartrate, 12.5 mg, oral, BID  pantoprazole, 40 mg, oral, Daily before breakfast  polyethylene glycol, 17 g, oral, Daily  sennosides, 1 tablet, oral, BID  surgical lubricant, , ,       Continuous Infusions:   PRN Meds:.PRN medications: diazePAM, ondansetron ODT **OR** ondansetron, oxyCODONE, oxyCODONE, oxygen, surgical lubricant    Labs:  Results from last 7 days   Lab Units 12/04/23  0702 12/02/23  0702 11/30/23  1155   WBC AUTO x10*3/uL 5.5 6.0 10.0   HEMOGLOBIN g/dL 11.8* 11.4* 12.7*   PLATELETS AUTO x10*3/uL 283 212 176      Results from last 7 days   Lab Units 12/04/23  0702 12/02/23  0702 11/30/23  1155   SODIUM mmol/L 136 137 135*   POTASSIUM mmol/L 3.9 3.7 3.7   CHLORIDE mmol/L 98 102 99   CO2 mmol/L 24 27 29   BUN mg/dL 11 7 7   CREATININE mg/dL 0.86 0.68 0.85   GLUCOSE mg/dL 91 96 96   MAGNESIUM mg/dL 1.95  --  1.85   PHOSPHORUS mg/dL 4.1 3.1 1.7*      Results from last 7 days   Lab Units 11/29/23  1151 11/29/23  0423 11/28/23  1709   INR  1.3* 1.2* 1.1    PROTIME seconds 14.3* 13.7* 12.9*   APTT seconds 24* 26* 25*     Assessment/Plan   28 y.o. male with history of Alport Syndrome, FMD, HLD, anxiety, depression, left renal artery aneurysm, renovascular hypertension who is s/p left renal artery aneurysm repair, right renal artery bypass.      [facility] medicine recommended discharge on metoprolol succinate 25 mg vs lisinopril 2.5 mg upon discharge. Patient is pending discharge to home possibly later today pending diet tolerance. Will add suppository.     Neuro: acute postoperative pain, anxiety, depression   - continue acetaminophen, oxycodone, lidoderm patch, valium for pain   - continue neurovascular checks every shift   - continue atarax for anxiety     CV: Alport syndrome, FMD, HLD, renovascular hypertension, renal artery aneurysm   - maintain blood pressure control with metoprolol   - continue ASA     Pulm:   - continue to encourage IS hourly while awake  - OOB to chair and increase ambulation as tolerated     FENGI:  renovascular hypertension, renal artery aneurysm s/p repair, hypoalbuminemia, hypokalemia, nausea/vomiting   - continue regular diet with oral supplements   - continue PPI  - continue bowel regimen to prevent OIC   - replete electrolytes as needed to maintain K>4, Mg>2   - antiemetics PRN for nausea   - I&Os qshift   - RFP daily      Endo:    - no issues      Heme:   - no s/sx of bleeding  - CBC as clinically indicated     ID:   -trend temp q4  -CBC daily      MSK:  - frequent ambulation as tolerated      Prophylaxis:  - SQH/SCDs     Dispo:   - continue care on regular nursing floor  - possible discharge today vs tomorrow     MALCOLM Almeida-CNP

## 2023-12-04 NOTE — CARE PLAN
Problem: Skin  Goal: Prevent/minimize sheer/friction injuries  Outcome: Progressing  Goal: Promote/optimize nutrition  Outcome: Progressing  Goal: Promote skin healing  Outcome: Progressing     Problem: Pain  Goal: My pain/discomfort is manageable  Outcome: Progressing  Goal: Ability to function at adequate level  Outcome: Progressing     Problem: Safety  Goal: Patient will be injury free during hospitalization  Outcome: Progressing  Goal: I will remain free of falls  Outcome: Progressing     Problem: Psychosocial Needs  Goal: Demonstrates ability to cope with hospitalization/illness  Outcome: Progressing  Goal: Collaborate with me, my family, and caregiver to identify my specific goals  Outcome: Progressing     Problem: Discharge Barriers  Goal: My discharge needs are met  Outcome: Progressing

## 2023-12-05 ENCOUNTER — PHARMACY VISIT (OUTPATIENT)
Dept: PHARMACY | Facility: CLINIC | Age: 28
End: 2023-12-05
Payer: COMMERCIAL

## 2023-12-05 VITALS
SYSTOLIC BLOOD PRESSURE: 129 MMHG | OXYGEN SATURATION: 98 % | BODY MASS INDEX: 19.6 KG/M2 | WEIGHT: 136.9 LBS | HEART RATE: 71 BPM | HEIGHT: 70 IN | DIASTOLIC BLOOD PRESSURE: 77 MMHG | TEMPERATURE: 97.9 F | RESPIRATION RATE: 18 BRPM

## 2023-12-05 PROCEDURE — 2500000001 HC RX 250 WO HCPCS SELF ADMINISTERED DRUGS (ALT 637 FOR MEDICARE OP)

## 2023-12-05 PROCEDURE — 2500000004 HC RX 250 GENERAL PHARMACY W/ HCPCS (ALT 636 FOR OP/ED)

## 2023-12-05 PROCEDURE — 2500000005 HC RX 250 GENERAL PHARMACY W/O HCPCS: Performed by: NURSE PRACTITIONER

## 2023-12-05 PROCEDURE — 2500000001 HC RX 250 WO HCPCS SELF ADMINISTERED DRUGS (ALT 637 FOR MEDICARE OP): Performed by: NURSE PRACTITIONER

## 2023-12-05 PROCEDURE — 2500000001 HC RX 250 WO HCPCS SELF ADMINISTERED DRUGS (ALT 637 FOR MEDICARE OP): Performed by: STUDENT IN AN ORGANIZED HEALTH CARE EDUCATION/TRAINING PROGRAM

## 2023-12-05 PROCEDURE — 96372 THER/PROPH/DIAG INJ SC/IM: CPT

## 2023-12-05 PROCEDURE — 99232 SBSQ HOSP IP/OBS MODERATE 35: CPT | Performed by: NURSE PRACTITIONER

## 2023-12-05 PROCEDURE — 2500000004 HC RX 250 GENERAL PHARMACY W/ HCPCS (ALT 636 FOR OP/ED): Performed by: NURSE PRACTITIONER

## 2023-12-05 PROCEDURE — RXMED WILLOW AMBULATORY MEDICATION CHARGE

## 2023-12-05 PROCEDURE — 2500000002 HC RX 250 W HCPCS SELF ADMINISTERED DRUGS (ALT 637 FOR MEDICARE OP, ALT 636 FOR OP/ED): Performed by: NURSE PRACTITIONER

## 2023-12-05 RX ORDER — ONDANSETRON 4 MG/1
4 TABLET, ORALLY DISINTEGRATING ORAL EVERY 8 HOURS PRN
Status: DISCONTINUED | OUTPATIENT
Start: 2023-12-05 | End: 2023-12-05 | Stop reason: HOSPADM

## 2023-12-05 RX ORDER — CYCLOBENZAPRINE HCL 10 MG
10 TABLET ORAL NIGHTLY
Status: DISCONTINUED | OUTPATIENT
Start: 2023-12-05 | End: 2023-12-05 | Stop reason: HOSPADM

## 2023-12-05 RX ORDER — OXYCODONE HYDROCHLORIDE 5 MG/1
5 TABLET ORAL EVERY 6 HOURS PRN
Qty: 28 TABLET | Refills: 0 | Status: SHIPPED | OUTPATIENT
Start: 2023-12-05 | End: 2023-12-12

## 2023-12-05 RX ORDER — ONDANSETRON 4 MG/1
4 TABLET, ORALLY DISINTEGRATING ORAL EVERY 8 HOURS PRN
Qty: 20 TABLET | Refills: 0 | Status: SHIPPED | OUTPATIENT
Start: 2023-12-05 | End: 2024-01-04

## 2023-12-05 RX ORDER — OXYCODONE HYDROCHLORIDE 5 MG/1
5 TABLET ORAL EVERY 4 HOURS PRN
Qty: 15 TABLET | Refills: 0 | Status: SHIPPED | OUTPATIENT
Start: 2023-12-05 | End: 2023-12-05 | Stop reason: SDUPTHER

## 2023-12-05 RX ORDER — CYCLOBENZAPRINE HCL 10 MG
5 TABLET ORAL 3 TIMES DAILY
Qty: 45 TABLET | Refills: 0 | Status: SHIPPED | OUTPATIENT
Start: 2023-12-05 | End: 2024-01-22 | Stop reason: ALTCHOICE

## 2023-12-05 RX ADMIN — LIDOCAINE 1 PATCH: 4 PATCH TOPICAL at 09:12

## 2023-12-05 RX ADMIN — OXYCODONE HYDROCHLORIDE 10 MG: 5 TABLET ORAL at 05:41

## 2023-12-05 RX ADMIN — OXYCODONE HYDROCHLORIDE 10 MG: 5 TABLET ORAL at 10:10

## 2023-12-05 RX ADMIN — HEPARIN SODIUM 5000 UNITS: 5000 INJECTION INTRAVENOUS; SUBCUTANEOUS at 06:00

## 2023-12-05 RX ADMIN — STANDARDIZED SENNA CONCENTRATE 8.6 MG: 8.6 TABLET ORAL at 09:12

## 2023-12-05 RX ADMIN — METOPROLOL TARTRATE 12.5 MG: 25 TABLET, FILM COATED ORAL at 09:12

## 2023-12-05 RX ADMIN — DIAZEPAM 5 MG: 5 TABLET ORAL at 09:12

## 2023-12-05 RX ADMIN — HEPARIN SODIUM 5000 UNITS: 5000 INJECTION INTRAVENOUS; SUBCUTANEOUS at 14:16

## 2023-12-05 RX ADMIN — BISACODYL 10 MG: 10 SUPPOSITORY RECTAL at 09:12

## 2023-12-05 RX ADMIN — DOCUSATE SODIUM 100 MG: 100 CAPSULE, LIQUID FILLED ORAL at 09:12

## 2023-12-05 RX ADMIN — OXYCODONE HYDROCHLORIDE 10 MG: 5 TABLET ORAL at 14:16

## 2023-12-05 RX ADMIN — OXYCODONE HYDROCHLORIDE 10 MG: 5 TABLET ORAL at 00:03

## 2023-12-05 RX ADMIN — ASPIRIN 81 MG CHEWABLE TABLET 81 MG: 81 TABLET CHEWABLE at 09:12

## 2023-12-05 RX ADMIN — POLYETHYLENE GLYCOL 3350 17 G: 17 POWDER, FOR SOLUTION ORAL at 09:12

## 2023-12-05 RX ADMIN — HEPARIN SODIUM 5000 UNITS: 5000 INJECTION INTRAVENOUS; SUBCUTANEOUS at 00:00

## 2023-12-05 RX ADMIN — PANTOPRAZOLE SODIUM 40 MG: 40 TABLET, DELAYED RELEASE ORAL at 05:41

## 2023-12-05 RX ADMIN — ACETAMINOPHEN 650 MG: 325 TABLET ORAL at 14:16

## 2023-12-05 RX ADMIN — ACETAMINOPHEN 650 MG: 325 TABLET ORAL at 09:12

## 2023-12-05 ASSESSMENT — PAIN SCALES - GENERAL
PAINLEVEL_OUTOF10: 0 - NO PAIN
PAINLEVEL_OUTOF10: 8

## 2023-12-05 ASSESSMENT — COGNITIVE AND FUNCTIONAL STATUS - GENERAL
CLIMB 3 TO 5 STEPS WITH RAILING: A LITTLE
DAILY ACTIVITIY SCORE: 24
MOBILITY SCORE: 23

## 2023-12-05 ASSESSMENT — PAIN - FUNCTIONAL ASSESSMENT
PAIN_FUNCTIONAL_ASSESSMENT: 0-10
PAIN_FUNCTIONAL_ASSESSMENT: 0-10

## 2023-12-05 NOTE — DISCHARGE SUMMARY
Discharge Diagnosis  Renal artery stenosis (CMS/HCC)    Issues Requiring Follow-Up  Renal artery stenosis    Test Results Pending At Discharge  Results for orders placed or performed during the hospital encounter of 11/28/23 (from the past 96 hour(s))   CBC   Result Value Ref Range    WBC 6.0 4.4 - 11.3 x10*3/uL    nRBC 0.0 0.0 - 0.0 /100 WBCs    RBC 3.92 (L) 4.50 - 5.90 x10*6/uL    Hemoglobin 11.4 (L) 13.5 - 17.5 g/dL    Hematocrit 34.1 (L) 41.0 - 52.0 %    MCV 87 80 - 100 fL    MCH 29.1 26.0 - 34.0 pg    MCHC 33.4 32.0 - 36.0 g/dL    RDW 12.5 11.5 - 14.5 %    Platelets 212 150 - 450 x10*3/uL   Renal Function Panel   Result Value Ref Range    Glucose 96 74 - 99 mg/dL    Sodium 137 136 - 145 mmol/L    Potassium 3.7 3.5 - 5.3 mmol/L    Chloride 102 98 - 107 mmol/L    Bicarbonate 27 21 - 32 mmol/L    Anion Gap 12 10 - 20 mmol/L    Urea Nitrogen 7 6 - 23 mg/dL    Creatinine 0.68 0.50 - 1.30 mg/dL    eGFR >90 >60 mL/min/1.73m*2    Calcium 8.6 8.6 - 10.6 mg/dL    Phosphorus 3.1 2.5 - 4.9 mg/dL    Albumin 3.7 3.4 - 5.0 g/dL   CBC   Result Value Ref Range    WBC 5.5 4.4 - 11.3 x10*3/uL    nRBC 0.0 0.0 - 0.0 /100 WBCs    RBC 4.15 (L) 4.50 - 5.90 x10*6/uL    Hemoglobin 11.8 (L) 13.5 - 17.5 g/dL    Hematocrit 36.3 (L) 41.0 - 52.0 %    MCV 88 80 - 100 fL    MCH 28.4 26.0 - 34.0 pg    MCHC 32.5 32.0 - 36.0 g/dL    RDW 12.6 11.5 - 14.5 %    Platelets 283 150 - 450 x10*3/uL   Renal Function Panel   Result Value Ref Range    Glucose 91 74 - 99 mg/dL    Sodium 136 136 - 145 mmol/L    Potassium 3.9 3.5 - 5.3 mmol/L    Chloride 98 98 - 107 mmol/L    Bicarbonate 24 21 - 32 mmol/L    Anion Gap 18 10 - 20 mmol/L    Urea Nitrogen 11 6 - 23 mg/dL    Creatinine 0.86 0.50 - 1.30 mg/dL    eGFR >90 >60 mL/min/1.73m*2    Calcium 9.0 8.6 - 10.6 mg/dL    Phosphorus 4.1 2.5 - 4.9 mg/dL    Albumin 3.8 3.4 - 5.0 g/dL   Magnesium   Result Value Ref Range    Magnesium 1.95 1.60 - 2.40 mg/dL        Hospital Course  Patient is a 28 year old male with  PMH of Alport Syndrome, FMD, HLD, anxiety, depression, left renal artery aneurysm, and renovascular hypertension. He is s/p left renal artery aneurysm repair, and right renal artery bypass with Dr. Kelly on 11/28/23. The patient tolerated the procedure well and he was transferred to the ICU and then regular nursing floor. While on the floor, the patient continued to recover as anticipated. His pain was controlled. He was able to tolerate a regular diet. He was able to void and ambulate without issues. The patient was evaluated by physical therapy, and no further PT needs were recommended following discharge. The patient was discharged on ASA 81 mg. He has an outpatient follow up with vascular surgery with repeat renal artery duplex in 1 month as scheduled.    Pertinent Physical Exam At Time of Discharge  Physical Exam  Constitutional: No acute distress, resting comfortably  Neuro:  AOx3, grossly intact  ENMT: moist mucous membranes  CV: sinus tachycardia on monitor   Pulm: non-labored on room air  GI: soft, appropriately tender, non-distended  Skin: midline incision intact  Musculoskeletal: moving all extremities  Extremities: palpable bilateral PT/DP pulses    Home Medications     Medication List      START taking these medications     acetaminophen 325 mg tablet; Commonly known as: Tylenol; Take 2 tablets   (650 mg) by mouth every 6 hours for 7 days.   cyclobenzaprine 10 mg tablet; Commonly known as: Flexeril; Take 0.5   tablets (5 mg) by mouth 3 times a day.   diazePAM 5 mg tablet; Commonly known as: Valium; Take 1 tablet (5 mg) by   mouth every 8 hours if needed for muscle spasms or anxiety for up to 10   days.   docusate sodium 100 mg capsule; Commonly known as: Colace; Take 1   capsule (100 mg) by mouth 2 times a day.   lidocaine 4 % patch; Place 1 patch over 12 hours on the skin once daily   for 10 days. Remove & discard patch within 12 hours or as directed by MD.   Do not start before December 5, 2023.    metoprolol succinate XL 25 mg 24 hr tablet; Commonly known as: Toprol   XL; Take 1 tablet (25 mg) by mouth once daily. Do not crush or chew.   ondansetron ODT 4 mg disintegrating tablet; Commonly known as:   Zofran-ODT; Take 1 tablet (4 mg) by mouth every 8 hours if needed for   nausea or vomiting.   oxyCODONE 5 mg immediate release tablet; Commonly known as: Roxicodone;   Take 1 tablet (5 mg) by mouth every 4 hours if needed for moderate pain (4   - 6) for up to 7 days.   sennosides 8.6 mg tablet; Commonly known as: Senokot; Take 1 tablet (8.6   mg) by mouth 2 times a day as needed for constipation for up to 14 days.     CHANGE how you take these medications     lisinopril 2.5 mg tablet; Take 1 tablet (2.5 mg) by mouth once daily.;   What changed: medication strength, how much to take     CONTINUE taking these medications     aspirin 81 mg EC tablet   hydrOXYzine HCL 25 mg tablet; Commonly known as: Atarax; Take 1 tablet   (25 mg) by mouth once daily for 10 days.   Nasacort 55 mcg nasal inhaler; Generic drug: triamcinolone   rosuvastatin 10 mg tablet; Commonly known as: Crestor; TAKE 1 TABLET (10   MG) BY MOUTH ONCE DAILY AT BEDTIME.   ZyrTEC 10 mg capsule; Generic drug: cetirizine       Outpatient Follow-Up  Future Appointments   Date Time Provider Department Center   1/2/2024  1:00 PM [facility] 107 VASCULAR 2 [id number] [facility]   1/2/2024  2:15 PM Jovany Kelly MD [id number] None   2/1/2024  9:20 AM Ekaetrina Flowers DO [id number] [facility]   2/2/2024  9:00 AM Fabian Stokes APRN-CNP [id number] [place]   3/15/2024  8:30 AM RAMESH Jameson, [facility] [id number] [facility]   7/31/2024 11:00 AM Bernie Jimenez MD [id number] [facility]       Naya Cook APRN-CNP

## 2023-12-07 ENCOUNTER — DOCUMENTATION (OUTPATIENT)
Dept: CARDIAC SURGERY | Facility: HOSPITAL | Age: 28
End: 2023-12-07
Payer: COMMERCIAL

## 2023-12-11 DIAGNOSIS — I10 ESSENTIAL HYPERTENSION: ICD-10-CM

## 2023-12-11 RX ORDER — LISINOPRIL 5 MG/1
5 TABLET ORAL DAILY
Qty: 30 TABLET | Refills: 1 | Status: SHIPPED | OUTPATIENT
Start: 2023-12-11 | End: 2023-12-27 | Stop reason: WASHOUT

## 2023-12-27 ENCOUNTER — OFFICE VISIT (OUTPATIENT)
Dept: VASCULAR SURGERY | Facility: HOSPITAL | Age: 28
End: 2023-12-27
Payer: COMMERCIAL

## 2023-12-27 VITALS
BODY MASS INDEX: 19.9 KG/M2 | HEIGHT: 70 IN | DIASTOLIC BLOOD PRESSURE: 91 MMHG | SYSTOLIC BLOOD PRESSURE: 141 MMHG | OXYGEN SATURATION: 98 % | HEART RATE: 88 BPM | WEIGHT: 139 LBS

## 2023-12-27 DIAGNOSIS — I72.2 ANEURYSM OF LEFT RENAL ARTERY (CMS-HCC): Primary | ICD-10-CM

## 2023-12-27 DIAGNOSIS — E78.5 HYPERLIPIDEMIA, UNSPECIFIED HYPERLIPIDEMIA TYPE: ICD-10-CM

## 2023-12-27 DIAGNOSIS — I10 ESSENTIAL HYPERTENSION: ICD-10-CM

## 2023-12-27 PROCEDURE — 99212 OFFICE O/P EST SF 10 MIN: CPT | Performed by: NURSE PRACTITIONER

## 2023-12-27 PROCEDURE — 3080F DIAST BP >= 90 MM HG: CPT | Performed by: NURSE PRACTITIONER

## 2023-12-27 PROCEDURE — 3077F SYST BP >= 140 MM HG: CPT | Performed by: NURSE PRACTITIONER

## 2023-12-27 PROCEDURE — 1036F TOBACCO NON-USER: CPT | Performed by: NURSE PRACTITIONER

## 2023-12-27 RX ORDER — LISINOPRIL 5 MG/1
5 TABLET ORAL DAILY
Qty: 60 TABLET | Refills: 3 | Status: SHIPPED | OUTPATIENT
Start: 2023-12-27 | End: 2024-01-22 | Stop reason: ALTCHOICE

## 2023-12-27 ASSESSMENT — PAIN SCALES - GENERAL: PAINLEVEL: 0-NO PAIN

## 2023-12-27 NOTE — PROGRESS NOTES
Vascular Surgery Clinic Note    CC: POV    History Of Present Illness:   Patrick Mckeon is a 28 y.o. male here postoperatively.  He underwent a left renal artery aneurysm repair and right renal artery bypass grafting on 11/28/2023 by Dr. Kelly.  He denies any discomfort in the abdomen or flank.  He lost 8 pounds postoperatively.  His appetite is starting to return.  He has a bowel movement every 2 days compared to daily preoperatively.  He is using supplemental fiber.  He reports being at 50 to 60% back to his baseline in terms of strength and stamina.  He walked 2 miles this past Sunday.  His blood pressure is well-controlled typically ranging between 110 and 120 systolic.    A few days ago, he noticed redness at the distal end of his incision and a small bump.  He denies any fevers or chills.    Medical History:  Patient Active Problem List   Diagnosis    Alport syndrome    Claudication (CMS/HCC)    Connective tissue disorder (CMS/HCC)    Elevated liver enzymes    BO (generalized anxiety disorder)    Ganglion cyst    Mixed hyperlipidemia    Lipoma of torso    Recurrent microscopic hematuria    Paresthesia of right arm    Renal arterial aneurysm (CMS/HCC)    Renal artery stenosis (CMS/HCC)    Renal cyst, left    Renovascular hypertension    Subjective pulsatile tinnitus of right ear    Vitamin D deficiency    White coat syndrome with hypertension    Moderate episode of recurrent major depressive disorder (CMS/HCC)    Fibromuscular dysplasia of bilateral renal arteries (CMS/HCC)    Middle aortic syndrome (CMS/HCC)    Aneurysm of left renal artery (CMS/HCC)        SH:    Social Determinants of Health     Tobacco Use: Low Risk  (12/27/2023)    Patient History     Smoking Tobacco Use: Never     Smokeless Tobacco Use: Never     Passive Exposure: Not on file   Alcohol Use: Not At Risk (12/1/2023)    AUDIT-C     Frequency of Alcohol Consumption: Never     Average Number of Drinks: Patient does not drink      "Frequency of Binge Drinking: Never   Financial Resource Strain: Low Risk  (12/1/2023)    Overall Financial Resource Strain (CARDIA)     Difficulty of Paying Living Expenses: Not hard at all   Food Insecurity: Not on file   Transportation Needs: No Transportation Needs (12/1/2023)    PRAPARE - Transportation     Lack of Transportation (Medical): No     Lack of Transportation (Non-Medical): No   Physical Activity: Not on file   Stress: Not on file   Social Connections: Not on file   Intimate Partner Violence: Not on file   Depression: At risk (12/1/2023)    PHQ-2     PHQ-2 Score: 5   Housing Stability: Low Risk  (12/1/2023)    Housing Stability Vital Sign     Unable to Pay for Housing in the Last Year: No     Number of Places Lived in the Last Year: 1     Unstable Housing in the Last Year: No   Utilities: Not on file   Digital Equity: Not on file        FH:  Family History   Problem Relation Name Age of Onset    Coronary artery disease Father      Hyperlipidemia Father      Coronary artery disease Father's Sister      Other (CABG) Father's Sister      Coronary artery disease Paternal Grandmother      Heart attack Paternal Grandmother      Coronary artery disease Paternal Grandfather      Heart attack Paternal Grandfather          Allergies:   Allergies   Allergen Reactions    Amlodipine Other     swelling in lower extremities       ROS:  All systems were reviewed and noted to be negative, other than described above.     Objective:  Last Recorded Vitals  Blood pressure (!) 141/91, pulse 88, height 1.778 m (5' 10\"), weight 63 kg (139 lb), SpO2 98 %.    Meds:   Current Outpatient Medications   Medication Instructions    aspirin 81 mg, oral, Daily    cetirizine (ZYRTEC) 10 mg, oral, Daily    cyclobenzaprine (FLEXERIL) 5 mg, oral, 3 times daily    diazePAM (VALIUM) 5 mg, oral, Every 8 hours PRN    docusate sodium (COLACE) 100 mg, oral, 2 times daily    hydrOXYzine HCL (ATARAX) 25 mg, oral, Daily    lisinopril 5 mg, oral, " Daily    metoprolol succinate XL (TOPROL XL) 25 mg, oral, Daily, Do not crush or chew.    ondansetron ODT (ZOFRAN-ODT) 4 mg, oral, Every 8 hours PRN    rosuvastatin (CRESTOR) 10 mg, oral, Nightly    triamcinolone (Nasacort) 55 mcg nasal inhaler 2 sprays, Each Nostril, Daily       Exam:  Constitutional: Well appearing, NAD   PSYCH: Appropriate mood and affect  Eyes: Sclera clear   Neck: Supple   CV: No tachycardia   RESP: Unlabored breathing   GI: Soft, nontender, non-distended.  Abdominal midline incision healing well without erythema, warmth or drainage.   NEURO: No focal deficits noted   EXTREMITIES: Warm & well perfused. No leg edema. No evidence of arterial ischemia. No evidence of venous insufficiency.   PULSES: Normal pulse exam throughout    Assessment & Plan:  1. Aneurysm of left renal artery (CMS/HCC)  CANCELED: CT angio abdomen pelvis w and or wo IV IV contrast        Status post left renal artery aneurysm repair, right renal artery bypass 11/28/2023 by Dr. Kelly.  Recovering as expected, blood pressure is well-controlled.   Distal incision erythema is resolved, may have been related to irritation from his pants or an ingrown hair.  There is no warmth or drainage.  Avoid shaving near incision.  -Follow-up with primary care regarding anxiety  -Keep scheduled follow-up with MALCOLM Stern-CNP

## 2023-12-28 RX ORDER — ROSUVASTATIN CALCIUM 10 MG/1
10 TABLET, COATED ORAL NIGHTLY
Qty: 90 TABLET | Refills: 1 | Status: SHIPPED | OUTPATIENT
Start: 2023-12-28

## 2024-01-02 ENCOUNTER — CLINICAL SUPPORT (OUTPATIENT)
Dept: VASCULAR MEDICINE | Facility: CLINIC | Age: 29
End: 2024-01-02
Payer: COMMERCIAL

## 2024-01-02 ENCOUNTER — OFFICE VISIT (OUTPATIENT)
Dept: VASCULAR SURGERY | Facility: CLINIC | Age: 29
End: 2024-01-02
Payer: COMMERCIAL

## 2024-01-02 VITALS — SYSTOLIC BLOOD PRESSURE: 129 MMHG | HEART RATE: 76 BPM | DIASTOLIC BLOOD PRESSURE: 89 MMHG

## 2024-01-02 DIAGNOSIS — I15.0 RENOVASCULAR HYPERTENSION: Primary | ICD-10-CM

## 2024-01-02 DIAGNOSIS — I72.2 ANEURYSM OF LEFT RENAL ARTERY (CMS-HCC): ICD-10-CM

## 2024-01-02 DIAGNOSIS — I70.1 RENAL ARTERY STENOSIS (CMS-HCC): ICD-10-CM

## 2024-01-02 DIAGNOSIS — M31.4: ICD-10-CM

## 2024-01-02 DIAGNOSIS — I72.2 RENAL ARTERIAL ANEURYSM (CMS-HCC): ICD-10-CM

## 2024-01-02 DIAGNOSIS — I77.3 FIBROMUSCULAR DYSPLASIA OF BILATERAL RENAL ARTERIES (CMS-HCC): ICD-10-CM

## 2024-01-02 PROCEDURE — 1036F TOBACCO NON-USER: CPT | Performed by: SURGERY

## 2024-01-02 PROCEDURE — 93975 VASCULAR STUDY: CPT | Performed by: SURGERY

## 2024-01-02 PROCEDURE — 99212 OFFICE O/P EST SF 10 MIN: CPT | Performed by: SURGERY

## 2024-01-02 PROCEDURE — 3074F SYST BP LT 130 MM HG: CPT | Performed by: SURGERY

## 2024-01-02 PROCEDURE — 3079F DIAST BP 80-89 MM HG: CPT | Performed by: SURGERY

## 2024-01-02 PROCEDURE — 93975 VASCULAR STUDY: CPT

## 2024-01-02 ASSESSMENT — PATIENT HEALTH QUESTIONNAIRE - PHQ9
2. FEELING DOWN, DEPRESSED OR HOPELESS: NOT AT ALL
1. LITTLE INTEREST OR PLEASURE IN DOING THINGS: NOT AT ALL
SUM OF ALL RESPONSES TO PHQ9 QUESTIONS 1 & 2: 0

## 2024-01-02 ASSESSMENT — PAIN SCALES - GENERAL: PAINLEVEL: 0-NO PAIN

## 2024-01-02 ASSESSMENT — ENCOUNTER SYMPTOMS
OCCASIONAL FEELINGS OF UNSTEADINESS: 0
LOSS OF SENSATION IN FEET: 0
DEPRESSION: 0

## 2024-01-02 NOTE — PROGRESS NOTES
Patient returns for follow-up.  His wounds of all healed.  He did call in December before [facility] for some redness at the belt line but this was seen by our nurse practitioner at [place] and it was noninfected.  He has completely healed everything and is doing quite well.  His blood pressure surprisingly to him is normal and he has recorded blood pressures in the low 100 mmHg systolic which He has never had.  He does suffer from whitecoat hypertension and was hypertensive at his mother follow-up and today his blood pressure is 129 mmHg systolic which again he is not surprised as he says that in doctor's office he will spike.  He thinks that his lisinopril should be decreased given his low normotension.    His duplex shows excellent perfusion of all sectors of his kidneys bilaterally.  His right bypass graft is widely patent.  His left superior pole renal artery was not specifically imaged but the proximal flows are normal.    I am quite satisfied with the results we have seen.  I do not think that interrogation of this kidney is required critically with the positive results we are getting clinically.  I did get  to sign off on a release form for a planned case report publication.    I will see him back in 3 months with a renal duplex.  He will be seeing Dr. Hathaawy at the end of this month.    Total time with patient was 25 minutes.

## 2024-01-22 ENCOUNTER — TELEMEDICINE (OUTPATIENT)
Dept: CARDIOLOGY | Facility: HOSPITAL | Age: 29
End: 2024-01-22
Payer: COMMERCIAL

## 2024-01-22 DIAGNOSIS — I70.1 RENAL ARTERY STENOSIS (CMS-HCC): ICD-10-CM

## 2024-01-22 DIAGNOSIS — I72.2 ANEURYSM OF LEFT RENAL ARTERY (CMS-HCC): ICD-10-CM

## 2024-01-22 DIAGNOSIS — M35.9 CONNECTIVE TISSUE DISORDER (MULTI): ICD-10-CM

## 2024-01-22 DIAGNOSIS — I10 ESSENTIAL HYPERTENSION: ICD-10-CM

## 2024-01-22 PROCEDURE — 99214 OFFICE O/P EST MOD 30 MIN: CPT | Performed by: INTERNAL MEDICINE

## 2024-01-22 RX ORDER — LISINOPRIL 2.5 MG/1
2.5 TABLET ORAL DAILY
Qty: 90 TABLET | Refills: 3 | Status: SHIPPED | OUTPATIENT
Start: 2024-01-22 | End: 2024-04-17 | Stop reason: SDUPTHER

## 2024-01-22 NOTE — PROGRESS NOTES
01/22/24  Cardiology/Vascular Medicine Follow-up    Virtual or Telephone Consent    An interactive audio and video telecommunication system which permits real time communications between the patient (at the originating site) and provider (at the distant site) was utilized to provide this telehealth service.   Verbal consent was requested and obtained from Patrick Mckeon on this date, 01/22/24 for a telehealth visit.       History Of Present Illness:    Patrick Mckeon is a terrific 28 y.o. male presenting with in follow-up of suspected renal artery stenosis/renovascular HTN.    He has complex renal artery anatomy with multiple renal arteries, left renal artery aneurysm. This is clearly  non atherosclerotic disease on the FMD/middle aortic syndrome spectrum.    He will be undergoing repeat renal angiography/renal vein renin sampling on 10.2.2023.    Since I saw him last in August, Bps OK overall on lisinopril 20 mg/day; he had ambulatory BP monitor done with mean /81 mm Hg.  He has not been hospitalized.    He is tolerating low dose aspirin without bleeding.    He has pulsatile tinnitus, unchanged. He does have leg sx right more so left leg, not classical for claudication; exercise ABIS done and were normal.    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    Last Recorded Vitals:  There were no vitals filed for this visit.    Past Medical History:  He has a past medical history of Anxiety, Depression, GERD (gastroesophageal reflux disease), Hyperlipidemia, Hypertension, Renal artery stenosis (CMS/HCC), and Renal disease due to hypertension.    Past Surgical History:  He has a past surgical history that includes Other surgical history (11/22/2022); CT angio abdomen pelvis w and or wo IV IV contrast (07/03/2023); CT angio head w and wo IV contrast (07/25/2023); CT angio neck (07/25/2023); IR angiogram renal bilateral (Bilateral,  08/15/2023); and Invasive Vascular Procedure (Bilateral, 10/12/2023).      Social History:  He reports that he has never smoked. He has never used smokeless tobacco. He reports that he does not currently use alcohol. He reports that he does not use drugs.    Family History:  Family History   Problem Relation Name Age of Onset    Coronary artery disease Father      Hyperlipidemia Father      Coronary artery disease Father's Sister      Other (CABG) Father's Sister      Coronary artery disease Paternal Grandmother      Heart attack Paternal Grandmother      Coronary artery disease Paternal Grandfather      Heart attack Paternal Grandfather          Allergies:  Amlodipine    Outpatient Medications:  Current Outpatient Medications   Medication Instructions    aspirin 81 mg, oral, Daily    cetirizine (ZYRTEC) 10 mg, oral, Daily    lisinopril 5 mg, oral, Daily    rosuvastatin (CRESTOR) 10 mg, oral, Nightly    triamcinolone (Nasacort) 55 mcg nasal inhaler 2 sprays, Each Nostril, Daily       Physical Exam:  He was in no distress  HEENT benign  Breathing non labored  He was alert, oriented, fluid speech  Appropriate affect     Last Labs:  CBC -  Lab Results   Component Value Date    WBC 5.5 12/04/2023    HGB 11.8 (L) 12/04/2023    HCT 36.3 (L) 12/04/2023    MCV 88 12/04/2023     12/04/2023       CMP -  Lab Results   Component Value Date    CALCIUM 9.0 12/04/2023    PHOS 4.1 12/04/2023    PROT 7.7 10/19/2023    ALBUMIN 3.8 12/04/2023    AST 23 10/19/2023    ALT 40 10/19/2023    ALKPHOS 64 10/19/2023    BILITOT 0.6 10/19/2023       LIPID PANEL -   Lab Results   Component Value Date    CHOL 141 10/10/2023    TRIG 64 10/10/2023    HDL 59.6 10/10/2023    CHHDL 2.4 10/10/2023    LDLF 69 11/16/2022    VLDL 13 10/10/2023    NHDL 81 10/10/2023       RENAL FUNCTION PANEL -   Lab Results   Component Value Date    GLUCOSE 91 12/04/2023     12/04/2023    K 3.9 12/04/2023    CL 98 12/04/2023    CO2 24 12/04/2023    ANIONGAP  18 12/04/2023    BUN 11 12/04/2023    CREATININE 0.86 12/04/2023    GFRMALE >90 08/10/2023    CALCIUM 9.0 12/04/2023    PHOS 4.1 12/04/2023    ALBUMIN 3.8 12/04/2023        Lab Results   Component Value Date    HGBA1C 5.4 10/10/2023     Imaging review: I have  personally reviewed the result(s) Exercise YANELIS study  Ambulatory BP monitor    Prior CTA head/neck 7.25.2023  IMPRESSION:  Unremarkable CTA of the head and neck, specifically no imaging  findings to suggest a sequela of a collagen vascular disease.     I personally reviewed the images/study and I agree with the findings  as stated. This study was interpreted at [facility], [place], [place].     Prior CTA abdomen/pelvis  IMPRESSION:  1. There are 2 right renal arteries with more superior artery  demonstrating critical greater than 90% stenosis with post stenotic  dilatation measuring up to 0.6 cm in diameter. The inferior right  renal artery demonstrates no significant stenosis.  2. There are 2 left renal arteries with more superior artery  demonstrating critical greater than 90% stenosis with saccular  aneurysmal dilatation measuring 1.5 cm in length and 1.0 cm in  diameter distal to the stenosis. The inferior left renal artery  demonstrates no significant stenosis.    Assessment/Plan   28 year-old man with premature onset HTN, pulsatile tinnitus, microscopic hematuria. 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  This is non atherosclerotic SULAIMAN.  NF1 has been ruled out by genetic testing. 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    We have had a series of complex multispecialty discussion among the vascular team and have conferred with national colleagues.  Mr. Mckeon will undergo repeat renal angiography to attempt to discern more anatomic information on origin of his  renal arteries (has large thoracic origin collateral) and also to undergo bilateral renal vein renin sampling. A complete revascularization procedure would be complex and major surgery.    We will hold lisinopril from tomorrow until the procedure; he will resume amlodipine 5 mg/day. He had leg swelling/discomfort on 10 mg/day; hopefully will be less on 5 mg/day but he will monitor BP a few times in the 10 days off medication to be sure we do not need to escalate.  His ambulatory BP monitor was reassuring.    I am very pleased his head/neck scan and ABIs with exercise are both OK.    RTC date/next steps to be determine based upon his angiogram and discussion with the multidisciplinary team.

## 2024-01-22 NOTE — PATIENT INSTRUCTIONS
Continue Lisinopril 2.5mg once daily.  Increase to 5mg if BP's are higher.   We will repeat Ambulatory blood pressure monitor when your new insurance is active.  Send us BP readings via [employer]  Readings should be around 120/70 at all times.   If your BP goes lower than 90/70 and you are dizzy, reach out to us.  Ordered blood work.   See you back for follow up in April virtually.      Very truly yours,    Vivi Brown MD  Co-Director, Vascular Center  [facility]   [facility] Master Clinician in Fibromuscular Dysplasia and Vascular Care  Professor of Medicine  [facility]

## 2024-02-01 ENCOUNTER — APPOINTMENT (OUTPATIENT)
Dept: PRIMARY CARE | Facility: CLINIC | Age: 29
End: 2024-02-01
Payer: COMMERCIAL

## 2024-02-02 ENCOUNTER — APPOINTMENT (OUTPATIENT)
Dept: NEPHROLOGY | Facility: CLINIC | Age: 29
End: 2024-02-02
Payer: COMMERCIAL

## 2024-03-01 ENCOUNTER — APPOINTMENT (OUTPATIENT)
Dept: NEPHROLOGY | Facility: CLINIC | Age: 29
End: 2024-03-01
Payer: COMMERCIAL

## 2024-03-01 NOTE — OP NOTE
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    Procedure Summary  Anesthesia: Moderate Sedation  ASA: II  Anesthesia Staff: Anesthesiologist: Arnel Paul MD  C-AA: PREMA Holman  Estimated Blood Loss: 0mL  Intra-op Medications:   Medication Name Total Dose   lidocaine (Xylocaine) 20 mg/mL (2 %) injection 20 mL   heparin 1,000 unit/mL injection 5,000 Units   protamine injection Cannot be calculated   iodixanol (VISIPaque) 320 mg iodine/mL injection 90 mL   sodium chloride 0.9% infusion Cannot be calculated              Anesthesia Record               Intraprocedure I/O Totals          Intake    Propofol Drip 0.00 mL    The total shown is the total volume documented since Anesthesia Start was filed.    sodium chloride 0.9% infusion 1000.00 mL    Total Intake 1000 mL          Specimen: No specimens collected     Staff:   Circulator: Phong Chacon RN  Scrub Person: Rudy Walker,          Drains and/or Catheters:  * None in log *    Tourniquet Times:         Implants:     Findings:   There are 3 right renal arteries.  The upper and middle right renal arteries reconstitute via collaterals from the right internal thoracic artery and the upper main right renal artery.  The lower main right renal artery from the aorta directly feeds a lower pole renal artery which only supplies about 25% the kidney and reconstitutes the upper and middle renal arteries via collaterals.  Both renal veins are patent.  The right renal vein blood was sampled for renin level.    Indications: Patirck Mckeon is an 28 y.o. 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     The patient was seen in the preoperative area. The risks, benefits, complications, treatment options, non-operative alternatives, expected recovery and outcomes were discussed with the patient. The possibilities of reaction to medication, pulmonary aspiration, injury to surrounding structures, bleeding, recurrent infection, the need for additional procedures, failure to diagnose a condition, and creating a complication requiring transfusion or operation were discussed with the patient. The patient concurred with the proposed plan, giving informed consent.  The site of surgery was properly noted/marked if necessary per policy. The patient has been actively warmed in preoperative area. Preoperative antibiotics are not indicated. Venous thrombosis prophylaxis are not indicated.    Procedure Details: Supine position, both groins prepped and draped.  Ultrasound-guided access of right femoral vein with the patient in 10 degrees reverse Trendelenburg.  I [place]ian sheath placed.  Using a Sim 1 catheter the right renal vein was directly accessed.  Renal venography performed confirming the location of the right renal vein which was patent.  Blood was slowly aspirated from the catheter to sample the blood from the  right kidney for renin level.  This was labeled and sent.  The left renal vein was then engaged and left renal venography performed showing it to be widely patent.  Because of the complications of the new EMR, a second sample from the left renal vein was not sent.  Once the right renal vein specimen clears, a later specimen will be sent from recovery from a peripheral vein.    The right common femoral artery was accessed with micropuncture kit using ultrasound guidance and a 5 Kittitian sheath was placed.  The patient was given 5000 units of systemic heparin.  A floppy Glidewire was used to access the right internal thoracic artery and a angled taper glide catheter was pushed into it.  Arteriography of the right internal thoracic artery was performed using cine fluoroscopy to clearly demonstrate filling of phrenic collaterals and subsequent reconstitution of upper and middle renal artery via these collaterals.  I right nephrogram was clearly visualized.  Subtraction views were also taken.    The catheter was retracted and using a Sim 1 catheter the renal arteries coming off of the aorta were directly accessed.  The upper right renal artery was accessed and it fed multiple convoluted collaterals that reconstituted the same upper and middle renal arteries which were not completely visualized but appreciated there was 2 main trunks feeding the upper and middle right kidney.  The lower renal artery was then engaged and arteriography showed direct perfusion of the right lower pole kidney constituting about 25% of the kidney and then reconstitution of the upper and middle right renal arteries via collaterals.The catheter was then draped over the aortic bifurcation and then wired and retracted.  Both sheaths were removed after deploying Vascade closure devices.  Hemostasis was achieved.      Complications:  None; patient tolerated the procedure well.    Disposition: PACU - hemodynamically stable.  Condition: stable          Additional Details:               Attending Attestation: I was present and scrubbed for the entire procedure.    Jovany Kelly  Phone Number: [phone]       ,

## 2024-03-15 ENCOUNTER — APPOINTMENT (OUTPATIENT)
Dept: AUDIOLOGY | Facility: CLINIC | Age: 29
End: 2024-03-15
Payer: COMMERCIAL

## 2024-03-15 DIAGNOSIS — I10 WHITE COAT SYNDROME WITH HYPERTENSION: ICD-10-CM

## 2024-03-15 DIAGNOSIS — I15.0 RENOVASCULAR HYPERTENSION: ICD-10-CM

## 2024-04-02 ENCOUNTER — OFFICE VISIT (OUTPATIENT)
Dept: VASCULAR SURGERY | Facility: CLINIC | Age: 29
End: 2024-04-02
Payer: COMMERCIAL

## 2024-04-02 ENCOUNTER — ANCILLARY PROCEDURE (OUTPATIENT)
Dept: VASCULAR MEDICINE | Facility: CLINIC | Age: 29
End: 2024-04-02
Payer: COMMERCIAL

## 2024-04-02 VITALS
DIASTOLIC BLOOD PRESSURE: 95 MMHG | WEIGHT: 140 LBS | BODY MASS INDEX: 19.6 KG/M2 | HEIGHT: 71 IN | SYSTOLIC BLOOD PRESSURE: 137 MMHG | HEART RATE: 62 BPM

## 2024-04-02 DIAGNOSIS — I72.2 ANEURYSM OF LEFT RENAL ARTERY (CMS-HCC): ICD-10-CM

## 2024-04-02 DIAGNOSIS — I15.0 RENOVASCULAR HYPERTENSION: Primary | ICD-10-CM

## 2024-04-02 DIAGNOSIS — I15.0 RENOVASCULAR HYPERTENSION: ICD-10-CM

## 2024-04-02 DIAGNOSIS — M31.4: ICD-10-CM

## 2024-04-02 DIAGNOSIS — I77.3 FIBROMUSCULAR DYSPLASIA OF BILATERAL RENAL ARTERIES (CMS-HCC): ICD-10-CM

## 2024-04-02 PROCEDURE — 1036F TOBACCO NON-USER: CPT | Performed by: SURGERY

## 2024-04-02 PROCEDURE — 99215 OFFICE O/P EST HI 40 MIN: CPT | Performed by: SURGERY

## 2024-04-02 PROCEDURE — 93975 VASCULAR STUDY: CPT | Performed by: SURGERY

## 2024-04-02 PROCEDURE — 3080F DIAST BP >= 90 MM HG: CPT | Performed by: SURGERY

## 2024-04-02 PROCEDURE — 3075F SYST BP GE 130 - 139MM HG: CPT | Performed by: SURGERY

## 2024-04-02 PROCEDURE — 93975 VASCULAR STUDY: CPT

## 2024-04-02 ASSESSMENT — PAIN SCALES - GENERAL: PAINLEVEL: 0-NO PAIN

## 2024-04-02 ASSESSMENT — ENCOUNTER SYMPTOMS
LOSS OF SENSATION IN FEET: 0
OCCASIONAL FEELINGS OF UNSTEADINESS: 0
DEPRESSION: 0

## 2024-04-02 ASSESSMENT — PATIENT HEALTH QUESTIONNAIRE - PHQ9
2. FEELING DOWN, DEPRESSED OR HOPELESS: NOT AT ALL
1. LITTLE INTEREST OR PLEASURE IN DOING THINGS: NOT AT ALL
SUM OF ALL RESPONSES TO PHQ9 QUESTIONS 1 AND 2: 0

## 2024-04-02 NOTE — PROGRESS NOTES
Primary Care Physician: Ekaterina Flowers DO  Primary Cardiologist:       Date of Visit: 04/02/2024  9:00 AM EDT  Location of visit: [facility]   Type of Visit: Follow up             Chief Complaint   Patient presents with    Follow-up     3 month with renal duplex       HPI / Summary:   Patrick Mckeon is a 29 y.o. male  with    who returns for routine follow up     He reports having excellent blood pressure control although he does have whitecoat hypertension in the office.  Today his 137 systolic.  He reports that in the past typically the office visits would result in a blood pressure in the 1 70-1 80 range.  He was on 5 mg lisinopril but this resulted in hypotension which is abnormal for him and he was dropped to 2.5 mg daily.  His duplex today shows patent normal flows into both renal arteries with normal perfusion of both kidneys.  The right-sided bypass is patent.  He had several questions regarding his abdomen.  I answered these.  He may fly.  He has no restrictions on his activities.  12 system review is negative except as noted above  Slight increase dyspnea with exertion -hill climbing.     Medical History:   Past Medical History:   Diagnosis Date    Anxiety     Depression     GERD (gastroesophageal reflux disease)     Hyperlipidemia     Hypertension     Renal artery stenosis (CMS/HCC)     Nep: Sindybuzz Qu, LOV 8/2023    Renal disease due to hypertension        Social History:   Tobacco Use: Low Risk  (4/2/2024)    Patient History     Smoking Tobacco Use: Never     Smokeless Tobacco Use: Never     Passive Exposure: Not on file         MEDICATIONS:   Current Outpatient Medications   Medication Instructions    aspirin 81 mg, oral, Daily    cetirizine (ZYRTEC) 10 mg, oral, Daily    lisinopril 2.5 mg, oral, Daily    rosuvastatin (CRESTOR) 10 mg, oral, Nightly    triamcinolone (Nasacort) 55 mcg nasal inhaler 2 sprays, Each Nostril, Daily         IMAGING REVIEWED:   Echocardiogram:  "  ECHOCARDIOGRAM     Narrative  Ordered by an unspecified provider.    Stress Testing: No results found for this or any previous visit from the past 1825 days.    Cardiac Catheterization: No results found for this or any previous visit from the past 1825 days.    Cardiac Scoring: No results found for this or any previous visit from the past 1825 days.    AAA : No results found for this or any previous visit from the past 1825 days.    OTHER: No results found for this or any previous visit from the past 1825 days.          LABS:  CBC with Differential:    Lab Results   Component Value Date    WBC 5.5 12/04/2023    RBC 4.15 (L) 12/04/2023    HGB 11.8 (L) 12/04/2023    HCT 36.3 (L) 12/04/2023     12/04/2023    MCV 88 12/04/2023    MCH 28.4 12/04/2023    MCHC 32.5 12/04/2023    RDW 12.6 12/04/2023    NRBC 0.0 12/04/2023    LYMPHOPCT 39.3 10/19/2023    MONOPCT 10.7 10/19/2023    EOSPCT 1.5 10/19/2023    BASOPCT 0.5 10/19/2023    MONOSABS 0.63 10/19/2023    LYMPHSABS 2.31 10/19/2023    EOSABS 0.09 10/19/2023    BASOSABS 0.03 10/19/2023     CMP:    Lab Results   Component Value Date     12/04/2023    K 3.9 12/04/2023    CL 98 12/04/2023    CO2 24 12/04/2023    BUN 11 12/04/2023    CREATININE 0.86 12/04/2023    GLUCOSE 91 12/04/2023    PROT 7.7 10/19/2023    CALCIUM 9.0 12/04/2023    BILITOT 0.6 10/19/2023    ALKPHOS 64 10/19/2023    AST 23 10/19/2023    ALT 40 10/19/2023     BMP:    Lab Results   Component Value Date     12/04/2023    K 3.9 12/04/2023    CL 98 12/04/2023    CO2 24 12/04/2023    BUN 11 12/04/2023    CREATININE 0.86 12/04/2023    CALCIUM 9.0 12/04/2023    GLUCOSE 91 12/04/2023     Magnesium:  Lab Results   Component Value Date    MG 1.95 12/04/2023     Troponin:    Lab Results   Component Value Date    TROPHS 5 10/19/2023     BNP: No results found for: \"BNP\"      Lipid Panel:  Lab Results   Component Value Date    HDL 59.6 10/10/2023    CHHDL 2.4 10/10/2023    VLDL 13 10/10/2023    TRIG 64 " 10/10/2023    [facility] 81 10/10/2023        Lab work and imaging results independently reviewed by me         Constitutional:       Appearance: Healthy appearance.   Eyes:      Pupils: Pupils are equal, round, and reactive to light.   HENT:    Mouth/Throat:      Pharynx: Oropharynx is clear.   Pulmonary:      Effort: Pulmonary effort is normal.   Chest:      Chest wall: Not tender to palpatation.   Cardiovascular:      PMI at left midclavicular line. Normal rate. Regular rhythm.   Pulses:     Intact distal pulses.   Edema:     Peripheral edema absent.   Abdominal:      Palpations: Abdomen is soft.      Tenderness: There is no abdominal tenderness.      Comments: Wound well healed -some modest keloid scar but not bothersome to patient, no hernia   Musculoskeletal: Normal range of motion.      Cervical back: Normal range of motion. Skin:     General: Skin is warm.   Neurological:      General: No focal deficit present.           Diagnoses and all orders for this visit:  Renovascular hypertension  -     Vascular US renal artery duplex complete; Future  Fibromuscular dysplasia of bilateral renal arteries (CMS/HCC)  -     Vascular US renal artery duplex complete; Future  Aneurysm of left renal artery (CMS/HCC)  -     Vascular US renal artery duplex complete; Future    Impression: Patient doing quite well with excellent response to the operation.  This appears to be one of the few substantial hemodynamic responses to revascularizing renovascular hypertension.  The duplex shows a patent right-sided bypass and both renal parenchyma are receiving normal perfusion.  Will recheck in 6 months with duplex.  Patient to follow-up with Dr. Hathaway regarding hypertension      Jovany Kelly MD       Orders:  No orders of the defined types were placed in this encounter.        Followup Appts:  Future Appointments   Date Time Provider Department Center   4/3/2024  8:00 AM [facility] [id number] CR NONV1 BP/HOLTER [id number] [facility] Minoff H   4/4/2024  10:00 AM Ekaterina Flowers DO [id number] [facility]   4/19/2024 11:00 AM RAMESH Jameson, CCC-A [id number] [facility]   5/10/2024 11:40 AM MALCOLM Moreno-CNP [id number] [facility]   5/13/2024  9:30 AM Vivi Brown MD [id number] [facility]   7/31/2024 11:00 AM Bernie Jimenez MD [id number] Academic

## 2024-04-03 ENCOUNTER — HOSPITAL ENCOUNTER (OUTPATIENT)
Dept: CARDIOLOGY | Facility: CLINIC | Age: 29
Discharge: HOME | End: 2024-04-03
Payer: COMMERCIAL

## 2024-04-03 DIAGNOSIS — I10 WHITE COAT SYNDROME WITH HYPERTENSION: ICD-10-CM

## 2024-04-03 PROCEDURE — 93790 AMBL BP MNTR W/SW I&R: CPT | Performed by: INTERNAL MEDICINE

## 2024-04-03 PROCEDURE — 93786 AMBL BP MNTR W/SW REC ONLY: CPT

## 2024-04-04 ENCOUNTER — LAB (OUTPATIENT)
Dept: LAB | Facility: LAB | Age: 29
End: 2024-04-04
Payer: COMMERCIAL

## 2024-04-04 ENCOUNTER — OFFICE VISIT (OUTPATIENT)
Dept: PRIMARY CARE | Facility: CLINIC | Age: 29
End: 2024-04-04
Payer: COMMERCIAL

## 2024-04-04 VITALS
SYSTOLIC BLOOD PRESSURE: 118 MMHG | HEIGHT: 71 IN | WEIGHT: 142 LBS | TEMPERATURE: 98.7 F | BODY MASS INDEX: 19.88 KG/M2 | OXYGEN SATURATION: 98 % | DIASTOLIC BLOOD PRESSURE: 77 MMHG | HEART RATE: 81 BPM

## 2024-04-04 DIAGNOSIS — Q87.81 ALPORT SYNDROME (HHS-HCC): ICD-10-CM

## 2024-04-04 DIAGNOSIS — I10 ESSENTIAL HYPERTENSION: ICD-10-CM

## 2024-04-04 DIAGNOSIS — I70.1 RENAL ARTERY STENOSIS (CMS-HCC): ICD-10-CM

## 2024-04-04 DIAGNOSIS — I15.0 RENOVASCULAR HYPERTENSION: ICD-10-CM

## 2024-04-04 DIAGNOSIS — M79.644 FINGER PAIN, RIGHT: ICD-10-CM

## 2024-04-04 DIAGNOSIS — F33.1 MODERATE EPISODE OF RECURRENT MAJOR DEPRESSIVE DISORDER (MULTI): Primary | ICD-10-CM

## 2024-04-04 LAB
ALBUMIN SERPL BCP-MCNC: 4.6 G/DL (ref 3.4–5)
ANION GAP SERPL CALC-SCNC: 12 MMOL/L (ref 10–20)
APPEARANCE UR: CLEAR
BILIRUB UR STRIP.AUTO-MCNC: NEGATIVE MG/DL
BUN SERPL-MCNC: 10 MG/DL (ref 6–23)
CALCIUM SERPL-MCNC: 9.4 MG/DL (ref 8.6–10.6)
CHLORIDE SERPL-SCNC: 103 MMOL/L (ref 98–107)
CO2 SERPL-SCNC: 28 MMOL/L (ref 21–32)
COLOR UR: ABNORMAL
CREAT SERPL-MCNC: 0.85 MG/DL (ref 0.5–1.3)
CREAT UR-MCNC: 140.5 MG/DL (ref 20–370)
EGFRCR SERPLBLD CKD-EPI 2021: >90 ML/MIN/1.73M*2
GLUCOSE SERPL-MCNC: 87 MG/DL (ref 74–99)
GLUCOSE UR STRIP.AUTO-MCNC: NORMAL MG/DL
KETONES UR STRIP.AUTO-MCNC: NEGATIVE MG/DL
LEUKOCYTE ESTERASE UR QL STRIP.AUTO: NEGATIVE
MICROALBUMIN UR-MCNC: 10.8 MG/L
MICROALBUMIN/CREAT UR: 7.7 UG/MG CREAT
MUCOUS THREADS #/AREA URNS AUTO: ABNORMAL /LPF
NITRITE UR QL STRIP.AUTO: NEGATIVE
PH UR STRIP.AUTO: 7.5 [PH]
PHOSPHATE SERPL-MCNC: 2.9 MG/DL (ref 2.5–4.9)
POTASSIUM SERPL-SCNC: 4.1 MMOL/L (ref 3.5–5.3)
PROT UR STRIP.AUTO-MCNC: NEGATIVE MG/DL
RBC # UR STRIP.AUTO: ABNORMAL /UL
RBC #/AREA URNS AUTO: ABNORMAL /HPF
SODIUM SERPL-SCNC: 139 MMOL/L (ref 136–145)
SP GR UR STRIP.AUTO: 1.02
UROBILINOGEN UR STRIP.AUTO-MCNC: NORMAL MG/DL
WBC #/AREA URNS AUTO: ABNORMAL /HPF

## 2024-04-04 PROCEDURE — 3078F DIAST BP <80 MM HG: CPT | Performed by: INTERNAL MEDICINE

## 2024-04-04 PROCEDURE — 80069 RENAL FUNCTION PANEL: CPT

## 2024-04-04 PROCEDURE — 3074F SYST BP LT 130 MM HG: CPT | Performed by: INTERNAL MEDICINE

## 2024-04-04 PROCEDURE — 81001 URINALYSIS AUTO W/SCOPE: CPT

## 2024-04-04 PROCEDURE — 99215 OFFICE O/P EST HI 40 MIN: CPT | Performed by: INTERNAL MEDICINE

## 2024-04-04 PROCEDURE — 36415 COLL VENOUS BLD VENIPUNCTURE: CPT

## 2024-04-04 PROCEDURE — 82570 ASSAY OF URINE CREATININE: CPT

## 2024-04-04 PROCEDURE — 1036F TOBACCO NON-USER: CPT | Performed by: INTERNAL MEDICINE

## 2024-04-04 PROCEDURE — 82043 UR ALBUMIN QUANTITATIVE: CPT

## 2024-04-04 RX ORDER — POLYETHYLENE GLYCOL 3350 17 G/17G
17 POWDER, FOR SOLUTION ORAL
COMMUNITY
Start: 2023-12-03 | End: 2024-04-04 | Stop reason: WASHOUT

## 2024-04-04 RX ORDER — ESCITALOPRAM OXALATE 10 MG/1
10 TABLET ORAL DAILY
Qty: 30 TABLET | Refills: 2 | Status: SHIPPED | OUTPATIENT
Start: 2024-04-04 | End: 2024-04-29

## 2024-04-04 RX ORDER — PANTOPRAZOLE SODIUM 40 MG/1
40 TABLET, DELAYED RELEASE ORAL
COMMUNITY
Start: 2023-12-01 | End: 2024-04-04 | Stop reason: WASHOUT

## 2024-04-04 ASSESSMENT — PATIENT HEALTH QUESTIONNAIRE - PHQ9
SUM OF ALL RESPONSES TO PHQ9 QUESTIONS 1 AND 2: 2
1. LITTLE INTEREST OR PLEASURE IN DOING THINGS: SEVERAL DAYS
2. FEELING DOWN, DEPRESSED OR HOPELESS: SEVERAL DAYS
10. IF YOU CHECKED OFF ANY PROBLEMS, HOW DIFFICULT HAVE THESE PROBLEMS MADE IT FOR YOU TO DO YOUR WORK, TAKE CARE OF THINGS AT HOME, OR GET ALONG WITH OTHER PEOPLE: SOMEWHAT DIFFICULT

## 2024-04-04 ASSESSMENT — PAIN SCALES - GENERAL: PAINLEVEL: 0-NO PAIN

## 2024-04-04 NOTE — PROCEDURES
24-hour Ambulatory Blood Pressure Monitor Report  [facility] [place]    Device: [employer] Monica ABPM 7100    Period of Monitoring: From 4/3/2024, 8:06 AM to 4/4/2024, 7:00 AM.     Successful Readings: 51 (98 %)     Indication:   Suspected white coat hypertension    Current Medications:  Current Outpatient Medications on File Prior to Encounter   Medication Sig Dispense Refill    aspirin 81 mg EC tablet Take 1 tablet (81 mg) by mouth once daily.      cetirizine (ZyrTEC) 10 mg capsule Take 1 capsule (10 mg) by mouth once daily.      lisinopril 2.5 mg tablet Take 1 tablet (2.5 mg) by mouth once daily. 90 tablet 3    rosuvastatin (Crestor) 10 mg tablet TAKE 1 TABLET (10 MG) BY MOUTH ONCE DAILY AT BEDTIME. 90 tablet 1    triamcinolone (Nasacort) 55 mcg nasal inhaler Administer 2 sprays into each nostril once daily.       No current facility-administered medications on file prior to encounter.          Findings (Please see attached report):   Average ambulatory blood pressure was 120/80 mmHg with a heart rate of 67 beats per minute.     The highest SBP and DBP was 139 at 10:40 and 103 at 20:20.     The lowest SBP and DBP was 78 at 9:41 and 42 at 9:41.     From 8 a.m. to 11 p.m., average blood pressure was 122/83 mmHg with average heart rate of 67 beats per minute.     From 11 p.m. to 8 a.m., average blood pressure was 109//63 mmHg with a heart rate of 68 beats per minute.     Patient went to bed at unknown and woke up at unknown.    Patient reported symptoms: None     Impression: Average 24-hour ambulatory blood pressure of 120/80 mmHg indicates that systolic/diastolic blood pressure is adequately controlled. Patient has normal nocturnal BP dipping. No white coat effect.    Recommendation: Management per referring clinician.       Hair Simpson MD, FANOVA, [facility]  Director,  Center for Cardiovascular Prevention  [facility] [place]  [facility]        Recommended standards for normal ambulatory blood pressure values which are proposed to be equivalent to clinic BP of <130/80 mmHg include: daytime BP <130/80 mg Hg, nighttime BP <110/65 mm Hg, and 24 hour BP <125/75 mm Hg.   (Noman SHELDON, et al.2017 High Blood Pressure Clinical Practice Guideline, Hypertension. 2017).     The clinical criteria for white coat hypertension are defined as:   Office blood pressure =130/80 mm Hg but <160/100 mm Hg after three month trial of lifestyle modification and suspected white coat hypertension with daytime ABPM or HBPM blood pressure <130/80 mm Hg.     The clinical criteria for masked hypertension are defined as:  Office blood pressure of 120 - 129/<80 mm Hg after three month trial of lifestyle modification and suspected masked hypertension with daytime ABPM or HBPM blood pressure =130/80 mm Hg.   (Measurement of Blood Pressure in Humans, A Scientific Statement from the American Heart Association, May 2019).

## 2024-04-04 NOTE — PATIENT INSTRUCTIONS
Patrick, it was a pleasure to see you today! Here is a list of things we have discussed and to follow up on:   Nephrology - I recommend Dr. Sujey Mcbride or Dr. Akhil Vega. Can also see Dr. Alex Conner or Dr. Job Parham.   COVID booster at the pharmacy   I have ordered blood and/or urine tests for you to do today. The lab can be found on this floor (2nd floor) next to the pharmacy across from the elevators.    Depression - Take half a tablet of Lexapro for the next 2 weeks, let me know if there are any improvement in symptoms in 2 weeks.   Stop by the first floor for your x-ray or call [phone] to have this scheduled.  Followup in 3 months

## 2024-04-04 NOTE — PROGRESS NOTES
Subjective   Patient ID: Patrick Mckeon is a 29 y.o. male who presents for Follow-up.  HPI  29-year-old male here for follow-up visit, last seen in October.  - Has noted some visual changes where things look different from a different perspective    - Depression/anxiety -referred to cognitive therapy at last visit, seen by Radhames for 3 sessions which did help and is pending to be seen by another psychologist on Monday. Believe symptoms are worsening, relationship triggered. Took prozac in the past which resulted in extreme anxiety, given by [place]. Then recommended Lexapro but never took it due to his experience with prozac. Was not ruled out for bipolar disorder.   - Experiencing some left second digit PIP discomfort with inability to fully flex, intermittent swelling that impairs functioning. No inciting event or injury.     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 Followed by genetics upcoming visit in June recommended ophthalmology. Followed by nephrology as well.   -Fibromuscular dysplasia-status post revascularization in November including treatment for left renal artery aneurysm seen by Dr. Garcia of vascular surgery has had follow-up 2 days ago with excellent response, normal perfusion noted recommended follow-up with duplex in 6 months time.  On aspirin  - Renovascular HTN -followed by Dr. Hathaway on lisinopril 2.5 mg, higher doses resulted in dizziness upcoming appointment in one month.   - ADHD - previously on Adderall now discontinued, attributes LFT dysfunction due to this.  - Prediabetes - resolved. History of   - HLD on rosuvastatin 10mg with notable carotid artery plaque   - Migraine headaches - triggered by uncontrolled allergies. Now resolved.   - Allergies - takes zyrtec and benadryl daily for significant symptoms.   -  "Insomnia - chronic, sleep study in the past unremarkable.   - Pulsatile tinnitus - resolved after discontinuation of lisinopril, no further tinnitus.  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   Sexually active with wife only.     Lifestyle   - Diet - trying to be healthier after period of indiscretions   - Exercise - walks and eliptical at low level, was recommended no further restriction   - Sleep - Has been bad recently related to depression.   Current Outpatient Medications   Medication Instructions    aspirin 81 mg, oral, Daily    cetirizine (ZYRTEC) 10 mg, oral, Daily    lisinopril 2.5 mg, oral, Daily    rosuvastatin (CRESTOR) 10 mg, oral, Nightly    triamcinolone (Nasacort) 55 mcg nasal inhaler 2 sprays, Each Nostril, Daily        Objective     /77   Pulse 81   Temp 37.1 °C (98.7 °F) (Temporal)   Ht 1.803 m (5' 11\")   Wt 64.4 kg (142 lb)   SpO2 98%   BMI 19.80 kg/m²     Physical Exam  General: Appears comfortable, NAD, appropriate affect  HEENT: NCAT, EOMI, pupils symmetric, no conjunctival erythema   Neck: Supple, no LAD   Heart: RRR S1 S2 no murmurs appreciated   Lungs: CTA bilaterally, no rhonchi, rales, or wheezes   Abdomen: Soft, NT/ND, no rebound or guarding, NABS   Extremities: no cyanosis or edema appreciated  Neuro: AAO x 3, answers questions appropriately, no FND, gait unremarkable     Assessment/Plan   Problem List Items Addressed This Visit       Alport syndrome   a/w sensorineural hearing loss and characteristic ocular findings, has been following with audiology upcoming visit scheduled later this month. Followed by genetics upcoming visit in June recommended ophthalmology. Followed by nephrology as well.     Essential hypertension  Controlled on low dose lisinopril       Moderate episode of recurrent major depressive disorder (CMS/HCC) - Primary     With " worsening of symptoms precipitated by life stressors which is exacerbating other symptoms including insomnia, no longer followed by [facility] has an appointment scheduled.   Resources provided,   MDQ score reviewed, PHQ9 reviewed   Severe side effects to prozac, will attempt Lexapro low dose. Potential side effects and management expectations reviewed.   Suggest close clinical followup in 1 month.           -Fibromuscular dysplasia-status post revascularization in November including treatment for left renal artery aneurysm seen by Dr. Garcia of vascular surgery has had follow-up 2 days ago with excellent response, normal perfusion noted recommended follow-up with duplex in 6 months time.  On aspirin    - Renovascular HTN -followed by Dr. Hathaway on lisinopril 2.5 mg, higher doses resulted in dizziness upcoming appointment in one month.     Followup 1 month     Assessment/Plan   Problem List Items Addressed This Visit       Alport syndrome    Relevant Orders    Referral to Ophthalmology    Urinalysis with Reflex Microscopic (Completed)    Albumin , Urine Random        Finger pain, right        Relevant Orders    XR fingers right 2+ views

## 2024-04-04 NOTE — PROGRESS NOTES
"ADULT AUDIOMETRIC EVALUATION      Name:  Patrick Mckeon  :  1995  Age:  29 y.o.  Date of Evaluation:  2024    IMPRESSIONS     Today's test results are consistent with normal hearing sensitivity, bilaterally. Results are consistent with most recent hearing evaluation performed on 2023. Discussed results and recommendations with patient.  Questions were addressed and the patient was encouraged to contact our department should concerns arise.    RECOMMENDATIONS     Return for ear cleaning as scheduled immediately prior to hearing evaluation in 6 months.  Audiologist to consider high frequency testing given complaints and history.    Time: 5775-6545    HISTORY     Patrick Mckeon is seen today at the request of Ekaterina Flowers DO for an evaluation of hearing.  Patient has a known history of Alport Syndrome. Most recently, he reported intermittent bothersome unilateral RE tinnitus (\"whooshing\"). Hearing evaluation at that time revealed normal hearing sensitivity, bilaterally. Recommended regular monitoring (twice a year) given reported history.     Today, he reported his tinnitus has improved given is it less constant than previous. He experiences bilateral intermittent tinnitus sometimes described as a \"whoosh\" as well as a \"ting/ringing\". He also notes an increase sensitivity to noise including startling to dishes clanging. This does not occur consistently but is noticeable & bothersome to patient.     Additionally, he is experiencing symptoms of dizziness described as \"visual movement\" of objects moving away from him. For example, while walking his visual field will often jump further away from him for moments at a time. This can also occur while bending over or looking down where the floor will change depth perception or jump further away. He has also noted some sensations of presyncope, however attributes this sensation more to blood pressure changes.     Patient denied history of " aural fullness, excessive noise exposure, or history of hearing aid use.    TEST RESULTS     Otoscopic Evaluation:  Physical exam to evaluate the outer ear  Right Ear: Clear ear canal.  Left Ear: Clear ear canal.    Tympanometry & Acoustic Reflexes:  Assesses the function of the middle ear and inner ear structures  Right Ear: Tympanometry revealed normal ear canal volume, peak pressure, and compliance, consistent with normal middle ear function (Type A). Ipsilateral Acoustic Reflexes were present at 500-4000 Hz.   Left Ear: Tympanometry revealed normal ear canal volume, peak pressure, and compliance, consistent with normal middle ear function (Type A). Ipsilateral Acoustic Reflexes were present at 500-4000 Hz.     Distortion Product Otoacoustic Emissions: Assesses the cochlear outer hair cell function (9224-8566 Hz frequency range)  DNT.    Pure Tone Audiometry:  Conventional Audiometry via TDH headphones with good reliability  Right Ear: Hearing sensitivity WNL.  Left Ear: Hearing sensitivity WNL.    Speech Audiometry:   Right Ear:  Speech Reception Threshold (SRT) was obtained at 0 dBHL. Speech reception threshold in agreement with pure tone average. Word Recognition scores were excellent (100%) in quiet when words were presented at 55 dBHL.   Left Ear:  Speech Reception Threshold (SRT) was obtained at 0 dBHL. Speech reception threshold in agreement with pure tone average. Word Recognition scores were excellent (100%) in quiet when words were presented at 50 dBHL.       Testing and interpretation of results completed Yandel Jameson CCC-A CCVR. It was my pleasure to evaluate this patient.       YANDEL Jameson, CCC-A CCVR  Senior Clinical Vestibular Audiologist      Degree of Hearing Sensitivity Decibel Range   Within Normal Limits (WNL) 0-25   Mild 26-40   Moderate 41-55   Moderately-Severe 56-70   Severe 71-90   Profound 91+      Key   CNT/DNT Could Not Test/Did Not Test   TM Tympanic Membrane   WNL Within  Normal Limits   HA Hearing Aid   SNHL Sensorineural Hearing Loss   CHL Conductive Hearing Loss   NIHL Noise-Induced Hearing Loss   ECV Ear Canal Volume   RE/LE Right Ear/Left Ear        AUDIOGRAM

## 2024-04-04 NOTE — ASSESSMENT & PLAN NOTE
With worsening of symptoms, no longer followed by [facility] has an appointment scheduled.   Resources provided,   MDQ score reviewed, PHQ9 reviewed   Severe side effects to prozac, will attempt Lexapro low dose. Potential side effects and management expectations reviewed.   Suggest close clinical followup in 1 month.

## 2024-04-05 ENCOUNTER — HOSPITAL ENCOUNTER (OUTPATIENT)
Dept: RADIOLOGY | Facility: CLINIC | Age: 29
Discharge: HOME | End: 2024-04-05
Payer: COMMERCIAL

## 2024-04-05 DIAGNOSIS — M79.644 FINGER PAIN, RIGHT: ICD-10-CM

## 2024-04-05 PROCEDURE — 73140 X-RAY EXAM OF FINGER(S): CPT | Mod: RT

## 2024-04-05 PROCEDURE — 73140 X-RAY EXAM OF FINGER(S): CPT | Mod: RIGHT SIDE | Performed by: RADIOLOGY

## 2024-04-17 DIAGNOSIS — I10 ESSENTIAL HYPERTENSION: ICD-10-CM

## 2024-04-17 DIAGNOSIS — I70.1 RENAL ARTERY STENOSIS (CMS-HCC): ICD-10-CM

## 2024-04-17 RX ORDER — LISINOPRIL 2.5 MG/1
2.5 TABLET ORAL DAILY
Qty: 90 TABLET | Refills: 3 | Status: SHIPPED | OUTPATIENT
Start: 2024-04-17 | End: 2024-05-13 | Stop reason: SDUPTHER

## 2024-04-19 ENCOUNTER — CLINICAL SUPPORT (OUTPATIENT)
Dept: AUDIOLOGY | Facility: CLINIC | Age: 29
End: 2024-04-19
Payer: COMMERCIAL

## 2024-04-19 DIAGNOSIS — H93.13 TINNITUS OF BOTH EARS: Primary | ICD-10-CM

## 2024-04-19 PROCEDURE — 92557 COMPREHENSIVE HEARING TEST: CPT

## 2024-04-19 PROCEDURE — 92550 TYMPANOMETRY & REFLEX THRESH: CPT

## 2024-04-27 DIAGNOSIS — F33.1 MODERATE EPISODE OF RECURRENT MAJOR DEPRESSIVE DISORDER (MULTI): ICD-10-CM

## 2024-04-29 RX ORDER — ESCITALOPRAM OXALATE 10 MG/1
10 TABLET ORAL DAILY
Qty: 90 TABLET | Refills: 1 | Status: SHIPPED | OUTPATIENT
Start: 2024-04-29

## 2024-05-07 ENCOUNTER — APPOINTMENT (OUTPATIENT)
Dept: OPHTHALMOLOGY | Facility: CLINIC | Age: 29
End: 2024-05-07
Payer: COMMERCIAL

## 2024-05-10 ENCOUNTER — OFFICE VISIT (OUTPATIENT)
Dept: NEPHROLOGY | Facility: CLINIC | Age: 29
End: 2024-05-10
Payer: COMMERCIAL

## 2024-05-10 VITALS
DIASTOLIC BLOOD PRESSURE: 80 MMHG | BODY MASS INDEX: 19.49 KG/M2 | TEMPERATURE: 98 F | HEIGHT: 71 IN | WEIGHT: 139.2 LBS | OXYGEN SATURATION: 96 % | HEART RATE: 67 BPM | SYSTOLIC BLOOD PRESSURE: 120 MMHG

## 2024-05-10 DIAGNOSIS — I15.0 RENOVASCULAR HYPERTENSION: ICD-10-CM

## 2024-05-10 DIAGNOSIS — Q87.81 ALPORT SYNDROME (HHS-HCC): Primary | ICD-10-CM

## 2024-05-10 DIAGNOSIS — N02.9 RECURRENT MICROSCOPIC HEMATURIA: ICD-10-CM

## 2024-05-10 DIAGNOSIS — I70.1 RENAL ARTERY STENOSIS (CMS-HCC): ICD-10-CM

## 2024-05-10 PROCEDURE — 99213 OFFICE O/P EST LOW 20 MIN: CPT | Performed by: NURSE PRACTITIONER

## 2024-05-10 PROCEDURE — 3074F SYST BP LT 130 MM HG: CPT | Performed by: NURSE PRACTITIONER

## 2024-05-10 PROCEDURE — 1036F TOBACCO NON-USER: CPT | Performed by: NURSE PRACTITIONER

## 2024-05-10 PROCEDURE — 3079F DIAST BP 80-89 MM HG: CPT | Performed by: NURSE PRACTITIONER

## 2024-05-10 ASSESSMENT — ENCOUNTER SYMPTOMS
EYES NEGATIVE: 1
CARDIOVASCULAR NEGATIVE: 1
RESPIRATORY NEGATIVE: 1
NEUROLOGICAL NEGATIVE: 1
MUSCULOSKELETAL NEGATIVE: 1
CONSTITUTIONAL NEGATIVE: 1
PSYCHIATRIC NEGATIVE: 1
GASTROINTESTINAL NEGATIVE: 1
ENDOCRINE NEGATIVE: 1
HEMATOLOGIC/LYMPHATIC NEGATIVE: 1

## 2024-05-10 NOTE — PROGRESS NOTES
History Of Present Illness  Patrick Mckeon is a 29 y.o. 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 Kelly, 11/28/23), HLD, Alport syndrome, microscopic hematuria, depression/anxiety, ADHD, lipoma s/p excision, and transaminitis who presents for a 9-month fuv for HTN and microscopic hematuria.        Past Medical History  As above.    Surgical History  He has a past surgical history that includes Other surgical history (11/22/2022); CT angio abdomen pelvis w and or wo IV IV contrast (07/03/2023); CT angio head w and wo IV contrast (07/25/2023); CT angio neck (07/25/2023); IR angiogram renal bilateral (Bilateral, 08/15/2023); and Invasive Vascular Procedure (Bilateral, 10/12/2023).     Social History  He reports that he has never smoked. He has never used smokeless tobacco. He reports that he does not currently use alcohol. He reports that he does not use drugs.    Family History  Family History   Problem Relation Name Age of Onset    Coronary artery disease Father      Hyperlipidemia Father      Coronary artery disease Father's Sister      Other (CABG) Father's Sister      Coronary artery disease Paternal Grandmother      Heart attack Paternal Grandmother      Coronary artery disease Paternal Grandfather      Heart attack Paternal Grandfather          Allergies  Amlodipine    Review of Systems   Constitutional: Negative.    HENT: Negative.     Eyes: Negative.    Respiratory: Negative.     Cardiovascular: Negative.    Gastrointestinal: Negative.    Endocrine: Negative.    Genitourinary: Negative.    Musculoskeletal: Negative.    Skin: Negative.    Neurological: Negative.    Hematological: Negative.    Psychiatric/Behavioral: Negative.          Physical Exam  Vitals reviewed.   Constitutional:       Appearance: Normal appearance.   HENT:      Head: Normocephalic and atraumatic.       "Mouth/Throat:      Mouth: Mucous membranes are moist.   Cardiovascular:      Rate and Rhythm: Normal rate and regular rhythm.      Pulses: Normal pulses.      Heart sounds: Normal heart sounds.   Pulmonary:      Effort: Pulmonary effort is normal.      Breath sounds: Normal breath sounds.   Musculoskeletal:         General: Normal range of motion.   Skin:     General: Skin is warm and dry.   Neurological:      General: No focal deficit present.      Mental Status: He is alert and oriented to person, place, and time.   Psychiatric:         Mood and Affect: Mood normal.         Behavior: Behavior normal.         Thought Content: Thought content normal.         Judgment: Judgment normal.          Last Recorded Vitals  Blood pressure 120/80, pulse 67, temperature 36.7 °C (98 °F), temperature source Temporal, height 1.803 m (5' 11\"), weight 63.1 kg (139 lb 3.2 oz), SpO2 96%.    Relevant Results  Recent Results (from the past 1344 hour(s))   Albumin , Urine Random    Collection Time: 04/04/24 11:20 AM   Result Value Ref Range    Albumin, Urine Random 10.8 Not established mg/L    Creatinine, Urine Random 140.5 20.0 - 370.0 mg/dL    Albumin/Creatine Ratio 7.7 <30.0 ug/mg Creat   Renal function panel    Collection Time: 04/04/24 11:20 AM   Result Value Ref Range    Glucose 87 74 - 99 mg/dL    Sodium 139 136 - 145 mmol/L    Potassium 4.1 3.5 - 5.3 mmol/L    Chloride 103 98 - 107 mmol/L    Bicarbonate 28 21 - 32 mmol/L    Anion Gap 12 10 - 20 mmol/L    Urea Nitrogen 10 6 - 23 mg/dL    Creatinine 0.85 0.50 - 1.30 mg/dL    eGFR >90 >60 mL/min/1.73m*2    Calcium 9.4 8.6 - 10.6 mg/dL    Phosphorus 2.9 2.5 - 4.9 mg/dL    Albumin 4.6 3.4 - 5.0 g/dL   Urinalysis with Reflex Microscopic    Collection Time: 04/04/24 11:20 AM   Result Value Ref Range    Color, Urine Light-Yellow Light-Yellow, Yellow, Dark-Yellow    Appearance, Urine Clear Clear    Specific Gravity, Urine 1.020 1.005 - 1.035    pH, Urine 7.5 5.0, 5.5, 6.0, 6.5, 7.0, 7.5, " "8.0    Protein, Urine NEGATIVE NEGATIVE, 10 (TRACE), 20 (TRACE) mg/dL    Glucose, Urine Normal Normal mg/dL    Blood, Urine 0.03 (TRACE) (A) NEGATIVE    Ketones, Urine NEGATIVE NEGATIVE mg/dL    Bilirubin, Urine NEGATIVE NEGATIVE    Urobilinogen, Urine Normal Normal mg/dL    Nitrite, Urine NEGATIVE NEGATIVE    Leukocyte Esterase, Urine NEGATIVE NEGATIVE   Microscopic Only, Urine    Collection Time: 04/04/24 11:20 AM   Result Value Ref Range    WBC, Urine 1-5 1-5, NONE /HPF    RBC, Urine 11-20 (A) NONE, 1-2, 3-5 /HPF    Mucus, Urine FEW Reference range not established. /LPF         Assessment/Plan     29 y.o. 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 Kelly, 11/28/23), HLD, Alport syndrome, microscopic hematuria, depression/anxiety, ADHD, lipoma s/p excision, and transaminitis who presents for a 9-month fuv for HTN and microscopic hematuria.    # Persistent microscopic hematuria: series UA showed persistent microscopic hematuria. Serum creatinine level WNL (4/4/24). Kidney ultrasound done on 2/3/23 showed a Bosniak 2 left renal cyst, otherwise unremarkable. Urology work-up was negative. Serology tests were negative. Patient was diagnosed with Alport syndrome in 2023, which my be the major contributing factor for microscopic hematuria. Patient reports that he is going back to Urology for continuous follow-up.     # HTN: Reportedly \"white coat syndrome\" and d/t anxiety. 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 Kelly, 11/28/23). BP is normal in office today. Patient is followed by cardiology for optimal HTN management.     # Renal artery stenosis: per renal artery ultrasound and CT angiogram. 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 Followed with vascular surgery.     # Alport syndrome: autosomal dominant type. Diagnosed by Genetics in . Followed by Genetics.     Plan:  - Continue to f/u with vascular surgery and cardiology.  - Continue to f/u with Urology for persistent microscopic hematuria.  - Continue to follow with Genetics.  - Reviewed strategies for renal protection includin) voidance of NSAIDs including Aleve (Naprosyn), Motrin (Ibuprofen), Mobic (Meloxicam), Celebrex (Celecoxib) and aspirin as well as PPI acid blocking medications such as Prilosec (omeprazole), Protonix (pantoprazole), and Nexium (esomeprazole), as well as other nephrotoxic agents.   2) Avoidance of tobacco or alcohol use.   3) Adequate hydration daily.   4) Blood pressure target 130/80 mmHg.   5) Daily dietary sodium intake less than 2 grams per day.    6) Maintain healthy lifestyle. Healthy diet and regular exercises.   7) Maintain ideal weight.  - FUV: not indicated at this time, unless new concerns arise in future.      Patient verbalized understanding of above. He will not hesitate to contact Division of Nephrology at [phone] with concerns/questions.     I spent 20 minutes in the professional and overall care of this patient.      MALCOLM Moreno-CNP

## 2024-05-13 ENCOUNTER — TELEMEDICINE (OUTPATIENT)
Dept: CARDIOLOGY | Facility: HOSPITAL | Age: 29
End: 2024-05-13
Payer: COMMERCIAL

## 2024-05-13 DIAGNOSIS — I10 ESSENTIAL HYPERTENSION: ICD-10-CM

## 2024-05-13 DIAGNOSIS — M35.9 CONNECTIVE TISSUE DISORDER (MULTI): ICD-10-CM

## 2024-05-13 DIAGNOSIS — I72.2 ANEURYSM OF LEFT RENAL ARTERY (CMS-HCC): ICD-10-CM

## 2024-05-13 DIAGNOSIS — I70.1 RENAL ARTERY STENOSIS (CMS-HCC): ICD-10-CM

## 2024-05-13 PROCEDURE — 99214 OFFICE O/P EST MOD 30 MIN: CPT | Performed by: INTERNAL MEDICINE

## 2024-05-13 PROCEDURE — 1036F TOBACCO NON-USER: CPT | Performed by: INTERNAL MEDICINE

## 2024-05-13 RX ORDER — LISINOPRIL 5 MG/1
5 TABLET ORAL DAILY
Qty: 90 TABLET | Refills: 3 | Status: SHIPPED | OUTPATIENT
Start: 2024-05-13 | End: 2025-05-13

## 2024-05-13 NOTE — LETTER
May 13, 2024     Ekaterina Flowers DO  [id number] [facility] [address]    Patient: Patrick Mckeon   YOB: 1995   Date of Visit: 5/13/2024       Dear Dr. Ekaterina Flowers DO:    Thank you for referring Patrick Mckeon to me for evaluation. Below are my notes for this consultation.  If you have questions, please do not hesitate to call me. I look forward to following your patient along with you.       Sincerely,     Vivi Brown MD      CC: MD Fabian Calvo, APRN-CNP  ______________________________________________________________________________________    05/13/24  9:32 AM    Vascular Medicine Follow-up    Virtual or Telephone Consent    An interactive audio and video telecommunication system which permits real time communications between the patient (at the originating site) and provider (at the distant site) was utilized to provide this telehealth service.   Verbal consent was requested and obtained from Patrick Mckeon on this date, 05/13/24 for a telehealth visit.     MR. Mckeon is a terrific 29 y.o. man seen in follow-up for renovascular HTN.  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     I have been managing his medication.  He was on lisinopril 5 mg/day but having low blood pressures. He is now back on lisinopril 2.5 mg/day.     Ambulatory BP monitoring done -- BP control overall good.    He is doing better in terms of any incisional pain, etc.  Bps at home 119/82 mm Hg, 120/70 mm Hg, 125/85 mm Hg recently.  Bps better after starting Lexapro.    No problems with aspirin.    He does notice when doing a walk/hike he used to do he has some dyspnea climbing a hill; no dyspnea at rest.    Past Medical History:   Diagnosis Date   • Anxiety    • Depression    • GERD  (gastroesophageal reflux disease)    • Hyperlipidemia    • Hypertension    • Renal artery stenosis (CMS-HCC)     Nep: Fabian Stokes, LOV 8/2023   • Renal disease due to hypertension      Past Surgical History:   Procedure Laterality Date   • CT ABDOMEN PELVIS ANGIOGRAM W AND/OR WO IV CONTRAST  07/03/2023    CT ABDOMEN PELVIS ANGIOGRAM W AND/OR WO IV CONTRAST 7/3/2023 [facility]   • CT ANGIO NECK  07/25/2023    CT NECK ANGIO W AND WO IV CONTRAST 7/25/2023 [facility] CT   • CT HEAD ANGIO W AND WO IV CONTRAST  07/25/2023    CT HEAD ANGIO W AND WO IV CONTRAST 7/25/2023 [facility] CT   • INVASIVE VASCULAR PROCEDURE Bilateral 10/12/2023    Procedure: Upper Extremity Angiogram;  Surgeon: Jovany Kelly MD;  Location: [facility];  Service: Vascular Surgery;  Laterality: Bilateral;   • IR ANGIOGRAM RENAL BILATERAL Bilateral 08/15/2023    IR ANGIOGRAM RENAL BILATERAL 8/15/2023 [facility]   • OTHER SURGICAL HISTORY  11/22/2022    Lipoma excision     Current Outpatient Medications   Medication Sig Dispense Refill   • aspirin 81 mg EC tablet Take 1 tablet (81 mg) by mouth once daily.     • cetirizine (ZyrTEC) 10 mg capsule Take 1 capsule (10 mg) by mouth once daily.     • escitalopram (Lexapro) 10 mg tablet TAKE 1 TABLET BY MOUTH EVERY DAY 90 tablet 1   • lisinopril 2.5 mg tablet Take 1 tablet (2.5 mg) by mouth once daily. 90 tablet 3   • rosuvastatin (Crestor) 10 mg tablet TAKE 1 TABLET (10 MG) BY MOUTH ONCE DAILY AT BEDTIME. 90 tablet 1   • triamcinolone (Nasacort) 55 mcg nasal inhaler Administer 2 sprays into each nostril once daily.       No current facility-administered medications for this visit.     On examination:  He is in no distress  HEENT benign  Breathing non labored  He was alert, oriented, fluid speech  Appropriate affect    4.2024 Labs  Component      Latest Ref Rng 4/4/2024   GLUCOSE      74 - 99 mg/dL 87    SODIUM      136 - 145 mmol/L 139    POTASSIUM      3.5 - 5.3 mmol/L 4.1    CHLORIDE      98 - 107 mmol/L  "103    Bicarbonate      21 - 32 mmol/L 28    Anion Gap      10 - 20 mmol/L 12    Blood Urea Nitrogen      6 - 23 mg/dL 10    Creatinine      0.50 - 1.30 mg/dL 0.85    EGFR      >60 mL/min/1.73m*2 >90    Calcium      8.6 - 10.6 mg/dL 9.4    PHOSPHORUS      2.5 - 4.9 mg/dL 2.9    Albumin      3.4 - 5.0 g/dL 4.6        Prior imaging   Renal duplex 1.2.2024  Normal velocities in both renal arteries/conduit  Right kidney 11.9, left 11.9  Slightly monophasic flow left renal vein    4.3.2024 Ambulatory BP monitor  51 successful readings  Average ambulatory blood pressure was 120/80 mmHg with a heart rate of 67 beats per minute.   The highest SBP and DBP was 139 at 10:40 and 103 at 20:20.   The lowest SBP and DBP was 78 at 9:41 and 42 at 9:41.   From 8 a.m. to 11 p.m., average blood pressure was 122/83 mmHg with average heart rate of 67 beats per minute.   From 11 p.m. to 8 a.m., average blood pressure was 109//63 mmHg with a heart rate of 68 beats per minute.   Patient went to bed at unknown and woke up at unknown.  Patient reported symptoms: None      Impression: Average 24-hour ambulatory blood pressure of 120/80 mmHg indicates that systolic/diastolic blood pressure is adequately controlled. Patient has normal nocturnal BP dipping. No white coat effect.  Recommendation: Management per referring clinician.       7.2023 CTA head/neck  No aneurysms, FMD, dissections    Surgical pathology intimal hyperplasia  A. AORTA:   -- Muscular artery with disordered architecture, see note.                   Note: Clinical correlation is recommended.   B.  RIGHT RENAL ARTERY:   -- Muscular artery with disordered intimal hyperplasia, consistent with clinical diagnosis of \"fibromuscular dysplasia\".   C.  LEFT RENAL ARTERY:  --Muscular artery with disordered intimal hyperplasia, consistent with clinical diagnosis of \"fibromuscular dysplasia.    Echos done 2019 and 2021 (outside studies)  Normal LV function  Trace MR/TR (former seen only on " 2019 exam).    Assessment/Plan: 29 y.o. man with renovascular HTN s/p complex revascularization in November including rx for left renal aneurysm.  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 Other degneerative/fibrotic process. He has Alport's syndrome, may or may not at all be related.    He is doing well. Was previously on lisinopril 20 mg/day, now 2.5 mg/day. Bps actually very good, but perhaps not optimal with diastolic BP still 80s.  We will try one more time to escalate lisinopril to 5 mg/day to have diastolic BP in 70s.  He will let me know how he does with this.    He will continue low dose aspirin. He continues statin for hyperlipidemia.  Repeat labs later this year with renal duplex. Last Cr/K+ great.    If exertional dyspnea up hill persists/does not improve, could consider exercise test without imaging (for BP with exercise) and possibly repeat echo (if exam changed).    RTC 3-4 months for in person visit to assess progress; interval MyChart communication on BP.    Vivi Brown MD

## 2024-05-13 NOTE — PROGRESS NOTES
05/13/24  9:32 AM    Vascular Medicine Follow-up    Virtual or Telephone Consent    An interactive audio and video telecommunication system which permits real time communications between the patient (at the originating site) and provider (at the distant site) was utilized to provide this telehealth service.   Verbal consent was requested and obtained from Patrick Mckeon on this date, 05/13/24 for a telehealth visit.     MR. Mckeon is a terrific 29 y.o. man seen in follow-up for renovascular HTN.  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     I have been managing his medication.  He was on lisinopril 5 mg/day but having low blood pressures. He is now back on lisinopril 2.5 mg/day.     Ambulatory BP monitoring done -- BP control overall good.    He is doing better in terms of any incisional pain, etc.  Bps at home 119/82 mm Hg, 120/70 mm Hg, 125/85 mm Hg recently.  Bps better after starting Lexapro.    No problems with aspirin.    He does notice when doing a walk/hike he used to do he has some dyspnea climbing a hill; no dyspnea at rest.    Past Medical History:   Diagnosis Date    Anxiety     Depression     GERD (gastroesophageal reflux disease)     Hyperlipidemia     Hypertension     Renal artery stenosis (CMS-HCC)     Nep: Fabian Stokes, LOV 8/2023    Renal disease due to hypertension      Past Surgical History:   Procedure Laterality Date    CT ABDOMEN PELVIS ANGIOGRAM W AND/OR WO IV CONTRAST  07/03/2023    CT ABDOMEN PELVIS ANGIOGRAM W AND/OR WO IV CONTRAST 7/3/2023 [facility] CT    CT ANGIO NECK  07/25/2023    CT NECK ANGIO W AND WO IV CONTRAST 7/25/2023 [facility] CT    CT HEAD ANGIO W AND WO IV CONTRAST  07/25/2023    CT HEAD ANGIO W AND WO IV CONTRAST 7/25/2023 [facility] CT    INVASIVE VASCULAR PROCEDURE Bilateral 10/12/2023     Procedure: Upper Extremity Angiogram;  Surgeon: Jovany Kelly MD;  Location: [facility] Cardiac Cath Lab;  Service: Vascular Surgery;  Laterality: Bilateral;    IR ANGIOGRAM RENAL BILATERAL Bilateral 08/15/2023    IR ANGIOGRAM RENAL BILATERAL [place] [facility] [place] [facility]    OTHER SURGICAL HISTORY  11/22/2022    Lipoma excision     Current Outpatient Medications   Medication Sig Dispense Refill    aspirin 81 mg EC tablet Take 1 tablet (81 mg) by mouth once daily.      cetirizine (ZyrTEC) 10 mg capsule Take 1 capsule (10 mg) by mouth once daily.      escitalopram (Lexapro) 10 mg tablet TAKE 1 TABLET BY MOUTH EVERY DAY 90 tablet 1    lisinopril 2.5 mg tablet Take 1 tablet (2.5 mg) by mouth once daily. 90 tablet 3    rosuvastatin (Crestor) 10 mg tablet TAKE 1 TABLET (10 MG) BY MOUTH ONCE DAILY AT BEDTIME. 90 tablet 1    triamcinolone (Nasacort) 55 mcg nasal inhaler Administer 2 sprays into each nostril once daily.       No current facility-administered medications for this visit.     On examination:  He is in no distress  HEENT benign  Breathing non labored  He was alert, oriented, fluid speech  Appropriate affect    4.2024 Labs  Component      Latest Ref Rng 4/4/2024   GLUCOSE      74 - 99 mg/dL 87    SODIUM      136 - 145 mmol/L 139    POTASSIUM      3.5 - 5.3 mmol/L 4.1    CHLORIDE      98 - 107 mmol/L 103    Bicarbonate      21 - 32 mmol/L 28    Anion Gap      10 - 20 mmol/L 12    Blood Urea Nitrogen      6 - 23 mg/dL 10    Creatinine      0.50 - 1.30 mg/dL 0.85    EGFR      >60 mL/min/1.73m*2 >90    Calcium      8.6 - 10.6 mg/dL 9.4    PHOSPHORUS      2.5 - 4.9 mg/dL 2.9    Albumin      3.4 - 5.0 g/dL 4.6        Prior imaging   Renal duplex 1.2.2024  Normal velocities in both renal arteries/conduit  Right kidney 11.9, left 11.9  Slightly monophasic flow left renal vein    4.3.2024 Ambulatory BP monitor  51 successful readings  Average ambulatory blood pressure was 120/80 mmHg with a heart rate of 67 beats per minute.  "  The highest SBP and DBP was 139 at 10:40 and 103 at 20:20.   The lowest SBP and DBP was 78 at 9:41 and 42 at 9:41.   From 8 a.m. to 11 p.m., average blood pressure was 122/83 mmHg with average heart rate of 67 beats per minute.   From 11 p.m. to 8 a.m., average blood pressure was 109//63 mmHg with a heart rate of 68 beats per minute.   Patient went to bed at unknown and woke up at unknown.  Patient reported symptoms: None      Impression: Average 24-hour ambulatory blood pressure of 120/80 mmHg indicates that systolic/diastolic blood pressure is adequately controlled. Patient has normal nocturnal BP dipping. No white coat effect.  Recommendation: Management per referring clinician.       7.2023 CTA head/neck  No aneurysms, FMD, dissections    Surgical pathology intimal hyperplasia  A. AORTA:   -- Muscular artery with disordered architecture, see note.                   Note: Clinical correlation is recommended.   B.  RIGHT RENAL ARTERY:   -- Muscular artery with disordered intimal hyperplasia, consistent with clinical diagnosis of \"fibromuscular dysplasia\".   C.  LEFT RENAL ARTERY:  --Muscular artery with disordered intimal hyperplasia, consistent with clinical diagnosis of \"fibromuscular dysplasia.    Echos done 2019 and 2021 (outside studies)  Normal LV function  Trace MR/TR (former seen only on 2019 exam).    Assessment/Plan: 29 y.o. man with renovascular HTN s/p complex revascularization in November including rx for left renal aneurysm.  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 Other degneerative/fibrotic process. He has Alport's syndrome, may or may not at all be related.    He is doing well. Was previously on lisinopril 20 mg/day, now 2.5 mg/day. Bps actually very good, but perhaps not optimal with diastolic BP still 80s.  We will try one more time to escalate lisinopril to 5 mg/day to have diastolic BP in 70s.  He will let me know how he " does with this.    He will continue low dose aspirin. He continues statin for hyperlipidemia.  Repeat labs later this year with renal duplex. Last Cr/K+ great.    If exertional dyspnea up hill persists/does not improve, could consider exercise test without imaging (for BP with exercise) and possibly repeat echo (if exam changed).    RTC 3-4 months for in person visit to assess progress; interval MyChart communication on BP.    Vivi Brown MD

## 2024-07-02 DIAGNOSIS — F33.1 MODERATE EPISODE OF RECURRENT MAJOR DEPRESSIVE DISORDER (MULTI): ICD-10-CM

## 2024-07-02 RX ORDER — ESCITALOPRAM OXALATE 20 MG/1
20 TABLET ORAL DAILY
Qty: 90 TABLET | Refills: 0 | Status: SHIPPED | OUTPATIENT
Start: 2024-07-02 | End: 2024-07-02

## 2024-07-02 RX ORDER — ESCITALOPRAM OXALATE 20 MG/1
20 TABLET ORAL DAILY
Qty: 90 TABLET | Refills: 0 | Status: SHIPPED | OUTPATIENT
Start: 2024-07-02

## 2024-07-14 DIAGNOSIS — E78.5 HYPERLIPIDEMIA, UNSPECIFIED HYPERLIPIDEMIA TYPE: ICD-10-CM

## 2024-07-15 RX ORDER — ROSUVASTATIN CALCIUM 10 MG/1
10 TABLET, COATED ORAL NIGHTLY
Qty: 90 TABLET | Refills: 1 | Status: SHIPPED | OUTPATIENT
Start: 2024-07-15

## 2024-07-31 ENCOUNTER — APPOINTMENT (OUTPATIENT)
Dept: GENETICS | Facility: CLINIC | Age: 29
End: 2024-07-31
Payer: COMMERCIAL

## 2024-07-31 DIAGNOSIS — Q87.81 ALPORT SYNDROME (HHS-HCC): ICD-10-CM

## 2024-07-31 DIAGNOSIS — I72.2 ANEURYSM OF LEFT RENAL ARTERY (CMS-HCC): Primary | ICD-10-CM

## 2024-07-31 PROCEDURE — 99215 OFFICE O/P EST HI 40 MIN: CPT | Performed by: INTERNAL MEDICINE

## 2024-07-31 NOTE — PROGRESS NOTES
MEDICAL GENETICS FOLLOW-UP VISIT NOTE    Patient full name: Patrick Mckeon  MRN: [id number]  YOB: 1995  Present during this visit: The patient     Primary care provider: Ekaterina Flowers DO    Medical history was obtained from the patient. Prior to this appointment, I reviewed medical records from outpatient medical records and scanned documents, if available. This visit was completed via televideo. All issues as below were discussed and addressed but no physical exam was performed. If it was felt that the patient should be evaluated in clinic then they were directed there. The patient consented to visit.    Interval history:  Mr. Mckeon is a 29 y.o. male who returned to Genetics clinic for autosomal dominant [id number] Alport syndrome. Last visit was 2023. He underwent an extensive vascular surgery in 2023. He is doing well postoperatively, and is here for a follow-up.     Family history:   A three generation pedigree was taken and significant for the following:  -Father (67) with HLD;  -Mother (60) with hx of lipomas (~10, had for entire life, benign, one particular large one on thigh);  -All paternal aunts and uncles with HLD;  -One paternal aunt and one paternal uncle with heart problems and s/p stent placement;  -Paternal grandfather (d. 60s) from MI;  -Paternal grandmother (d. 60s) from MI;  -Maternal aunt (40s) with DM, unknown cancer, solitary kidney;  -Maternal grandfather (d. 90s) with MI in 50s;  -Maternal grandmother (d. 70s-80s) with HTN,  of old age.     Denies family history of aneurysm, intellectual disability, birth defects, multiple miscarriages, kidney disease, heart disease, deafness, blindness, muscle dz, blood disorder, or any genetic disorder. Ethnicity is [place] (paternal and maternal). Denies AJ ancestry. Patient believes that there is likely no consanguinity in his family but is unsure as there is consanguinity in [facility] culture. Please see  scanned pedigree in [facility] Genetics folder for more details.     Results:  Genetic testing  [employer] Alport Syndrome Panel (6 genes)  [employer] Connective Tissue Disorders Panel (92 genes)    COL4A3 c.3499G>A (p.[id number]) heterozygous PATHOGENIC  LPOBYL99 c.2125G>A (p.Vwh346Yoj) heterozygous Uncertain Significance  PQKLNF63 c.2702G>A (p.Sfm517Jck) heterozygous Uncertain Significance  GORAB c.635G>A (p.Rqe763Ebm) heterozygous Uncertain Significance    Assessment:  Mr. Mckeon is a 29 y.o. male who returned to Genetics clinic for autosomal dominant [id number] Alport syndrome. The diagnosis was discussed during the last visit on 7/31/2023.    Last urine albumin 4/2024 was reassuring.     After the last visit, I reached out to the national expert in Alport syndrome. The COL4A3 mutations is unlikely the cause of FMD-like process that he has. The correspondence from the Alport syndrome expert is copied below:  Aortic root dilatation and aortic aneurysm, as well as aneurysms of smaller arteries, have been found primarily in males with X-linked Alport syndrome.  This potential association is consistent with the presence of collagen IV alpha-556 networks in arterial media of unaffected individuals.  The collagen IV alpha-345 network is not present, to my knowledge, in aortic media, making a connection between a heterozygous variant in COL4A3 and arterial aneurysm unlikely.     I think it is more likely that in your patient the co-occurence of the COL4A3 variant and renal artery stenosis/aneurysm represents random association.    I do not have additional recommendations regarding these findings at present. Continue follow up with Cardiology/Vascular Medicine is appropriate.     We briefly reviewed clinical manifestations of autosomal dominant [id number] Alport syndrome today.     - Progressive kidney impairment is rare but not impossible. Given that Alport syndrome is a glomerular disease, it is recommended that  Nephrology follow hematuria, proteinuria, and kidney function. I reviewed that medications for proteinuria are available. Urine analysis, protein or albumin testing in urine, and kidney function should be checked periodically. They are recommended to be tested annually if urine microalbumin-creatinine ratio <30 mg/g or urine protein-creatinine ratio <0.2 mg/mg, or every 6 mos if urine microalbumin-creatinine ratio >30 mg/g or urine protein-creatinine ratio >0.2 mg/mg. Alport syndrome is a well-known cause of hematuria. If there are no urologic causes of hematuria (such as calculi or tumors) identified, no additional urologic workups are necessary unless new symptoms develop (such as flank pain and hematuria).   - Hearing loss may occur later in life. Guidelines suggest hearing test every 2 years. He has not seen Audiology. If normal, given young age, I do not think it is unreasonable to have a hearing test with an interval longer than 2 years for now.   - Ocular symptoms are rare in autosomal dominant [id number] Alport syndrome. This includes anterior lenticonus, maculopathy, corneal endothelial vesicles, and cataracts. He endorses having some visual symptoms. I recommend that he see Ophthalmology for a baseline eye exam.   - Aortopathy and vasculopathy are not a known association with the COL4A3 mutations.     Inheritance and testing other family members were discussed.     Plan:  - See Vascular Medicine and Vascular Surgery for history of vasculopathy.   - Lifelong monitoring of blood pressure, kidney function, and proteinuria. The patient is comfortable seeing PCP for this. Low risk of CKD/ESRD but not entirely impossible. Alport syndrome is a known cause of hematuria (both microscopic and gross); if urologic workups are negative, additional testing might not be necessary unless new symptoms develop (such as flank pain). Very low threshold for Nephrology referral given that it is a glomerular disease.   -  Periodic hearing test.   - Baseline eye exam then as needed if normal.   - Support group: [email or web address]    Follow-up in 3-5 years to review additional information regarding autosomal dominant [id number] Alport syndrome as well as to review whether there is updated testing for FMD/vasculopathy.     Bernie Jimenez MD    Medical Geneticist  [facility]  Phone: [phone]  Fax: [phone]   Address: [address] [place] [address]  Time spent (this information is required by insurance): face-to-face 30min; preparation 5min; documentation 25min; care coordination 5min; total time spent 65min   Eye Shield Used: No

## 2024-08-01 ENCOUNTER — APPOINTMENT (OUTPATIENT)
Dept: PRIMARY CARE | Facility: CLINIC | Age: 29
End: 2024-08-01
Payer: COMMERCIAL

## 2024-08-01 VITALS
OXYGEN SATURATION: 98 % | HEART RATE: 67 BPM | SYSTOLIC BLOOD PRESSURE: 114 MMHG | WEIGHT: 141.6 LBS | TEMPERATURE: 98.3 F | DIASTOLIC BLOOD PRESSURE: 63 MMHG | BODY MASS INDEX: 19.75 KG/M2

## 2024-08-01 DIAGNOSIS — F33.1 MODERATE EPISODE OF RECURRENT MAJOR DEPRESSIVE DISORDER (MULTI): ICD-10-CM

## 2024-08-01 DIAGNOSIS — Z00.00 ENCOUNTER FOR PREVENTATIVE ADULT HEALTH CARE EXAMINATION: Primary | ICD-10-CM

## 2024-08-01 DIAGNOSIS — I10 ESSENTIAL HYPERTENSION: ICD-10-CM

## 2024-08-01 DIAGNOSIS — Q87.81 ALPORT SYNDROME (HHS-HCC): ICD-10-CM

## 2024-08-01 PROCEDURE — 3074F SYST BP LT 130 MM HG: CPT | Performed by: INTERNAL MEDICINE

## 2024-08-01 PROCEDURE — 99214 OFFICE O/P EST MOD 30 MIN: CPT | Performed by: INTERNAL MEDICINE

## 2024-08-01 PROCEDURE — 3078F DIAST BP <80 MM HG: CPT | Performed by: INTERNAL MEDICINE

## 2024-08-01 PROCEDURE — 1036F TOBACCO NON-USER: CPT | Performed by: INTERNAL MEDICINE

## 2024-08-01 NOTE — PATIENT INSTRUCTIONS
Patrick,   KEEP UP THE GOOD WORK!   Stay well hydrated.   Followup 3 months for physical   Bloodwork to be done 1 week prior

## 2024-08-01 NOTE — PROGRESS NOTES
Subjective   Patient ID: Patrick Mckeon is a 29 y.o. male who presents for No chief complaint on file..  HPI  29-year-old male here for follow-up visit, last seen in April 4/24: xray finger normal.   5/24 seen by Dr. Hathaway with cardiology increasing lisinopril to 5 mg consider stress test follow-up 4 months    - Depression/anxiety -relationship triggered seen by behavioral health in the past, started Lexapro which was uptitrated to 10mg and then to  20 mg after noted to have improvement in symptoms.  Severe side effects to Prozac in the past. He believes that this is helping his symptoms, feels more internally quiet than previous, anxiety has improved. Has been on the 20mg dosage for the last 3 weeks. Believes that lifestyle at home and home stresses are contributing to his symptoms, plans on adjusting this to see if symptoms improve. Bad dreams have significantly resolved. No impairment in sleep and sleeping better. Has been undergoing therapy every week.   - Has noted a few times of leg fatigue with exertion, not consistent and sporadic.  Arterial study last year with exercise unremarkble.   - Noted 3 discrete areas of skin discoloration on scrotum, possibly a consequence of shaving. No symptoms.     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  Followed by genetics, recently seen.  Declines further follow-up with nephrology we will continue to periodically monitor for progressive renal failure, monitoring for proteinuria.  Check annual albumin or every 6 months if increasing.  Recommended follow-up in 3 to 5 years with genetics seen yesterday.  - Fibromuscular dysplasia-status post revascularization in November including treatment for left renal artery aneurysm seen by Dr. Garcia of vascular surgery on aspirin due for repeat duplex ultrasound pending for repeat in September.   -  Renovascular HTN -followed by Dr. Hathaway on lisinopril uptitrated to 5mg tolerated well!! No side effects,   - ADHD - previously on Adderall now discontinued, attributes LFT dysfunction due to this, then vyvanse did not work.   - Prediabetes - resolved.   - HLD on rosuvastatin 10mg with h/o carotid artery plaque   - Migraine headaches - triggered by uncontrolled allergies. Now resolved.   - Allergies - takes zyrtec and benadryl daily for significant symptoms.   - Insomnia - resolved.  - Pulsatile tinnitus - resolved. CT angio head and neck unremarkable      Social:   -Lives with wife tawana and two cats, pending separation.  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   Sexually active with wife only.   Current Outpatient Medications   Medication Instructions    aspirin 81 mg, oral, Daily    cetirizine (ZYRTEC) 10 mg, oral, Daily    escitalopram (LEXAPRO) 20 mg, oral, Daily    lisinopril 5 mg, oral, Daily    rosuvastatin (CRESTOR) 10 mg, oral, Nightly    triamcinolone (Nasacort) 55 mcg nasal inhaler 2 sprays, Each Nostril, Daily        Objective     /63 (BP Location: Left arm, Patient Position: Sitting, BP Cuff Size: Small adult)   Pulse 67   Temp 36.8 °C (98.3 °F) (Oral)   Wt 64.2 kg (141 lb 9.6 oz)   SpO2 98%   BMI 19.75 kg/m²     Physical Exam  General: Alert and oriented, in no apparent distress   HEENT: No conjunctival erythema, no external facial lesions   Lungs: Breathing comfortably  Skin: No evidence of skin breakdown.  Neuro: AAO x 3, answering questions appropriately, no obvious cranial nerve deficits   Testicular exam:  Chaperone offered and declined. 3 discrete areas of microhemorrhages appreciated at left scrotum, no swelling, nontender, no erythema or other abnormalities appreciated.       Assessment/Plan     Assessment & Plan  Essential hypertension  With adequate control of blood pressure readings with uptitration of  lisinopril to 5 mg without noted side effects!  Has adequate follow-up with cardiology.  Orders:    Follow Up In [facility] - PCP - Established    Albumin-Creatinine Ratio, Urine Random; Future    Encounter for preventative adult health care examination  Labs in anticipation for preventive care visit at next visit.   Orders:    CBC and Auto Differential; Future    Comprehensive Metabolic Panel; Future    Hemoglobin A1C; Future    Lipid Panel; Future    TSH with reflex to Free T4 if abnormal; Future    Vitamin D 25-Hydroxy,Total (for eval of Vitamin D levels); Future    Alport syndrome ([facility]-HCC)  Seen by genetics recommended periodic monitoring of renal function as well as albumin to creatinine ratio was.  Last checked in April unremarkable.         Moderate episode of recurrent major depressive disorder (Multi)  With significant improvement after institution and up titration of Lexapro without notable for severe side effects.  Has no intention of discontinuing this medication.   Insomnia has resolved.   Continue current dose, follow-up at next visit         Health Maintenance   Immunizations: Flu shot and COVID booster recommended when available.      Follow-up 3 months for preventive care visit and next visit

## 2024-08-01 NOTE — ASSESSMENT & PLAN NOTE
With adequate control of blood pressure readings with uptitration of lisinopril to 5 mg without noted side effects!  Has adequate follow-up with cardiology.  Orders:    Follow Up In [facility] - PCP - Established    Albumin-Creatinine Ratio, Urine Random; Future

## 2024-08-02 NOTE — ASSESSMENT & PLAN NOTE
Seen by genetics recommended periodic monitoring of renal function as well as albumin to creatinine ratio was.  Last checked in April unremarkable.

## 2024-08-02 NOTE — ASSESSMENT & PLAN NOTE
With significant improvement after institution and up titration of Lexapro without notable for severe side effects.  Has no intention of discontinuing this medication.   Insomnia has resolved.   Continue current dose, follow-up at next visit

## 2024-08-19 ENCOUNTER — OFFICE VISIT (OUTPATIENT)
Dept: CARDIOLOGY | Facility: HOSPITAL | Age: 29
End: 2024-08-19
Payer: COMMERCIAL

## 2024-08-19 VITALS
HEART RATE: 62 BPM | WEIGHT: 141.3 LBS | SYSTOLIC BLOOD PRESSURE: 120 MMHG | HEIGHT: 71 IN | DIASTOLIC BLOOD PRESSURE: 68 MMHG | BODY MASS INDEX: 19.78 KG/M2 | RESPIRATION RATE: 18 BRPM | OXYGEN SATURATION: 99 %

## 2024-08-19 DIAGNOSIS — R06.02 SHORTNESS OF BREATH: ICD-10-CM

## 2024-08-19 DIAGNOSIS — I72.2 ANEURYSM OF LEFT RENAL ARTERY (CMS-HCC): Primary | ICD-10-CM

## 2024-08-19 DIAGNOSIS — I70.1 RENAL ARTERY STENOSIS (CMS-HCC): ICD-10-CM

## 2024-08-19 DIAGNOSIS — M31.4: ICD-10-CM

## 2024-08-19 PROCEDURE — 3074F SYST BP LT 130 MM HG: CPT | Performed by: INTERNAL MEDICINE

## 2024-08-19 PROCEDURE — 3078F DIAST BP <80 MM HG: CPT | Performed by: INTERNAL MEDICINE

## 2024-08-19 PROCEDURE — 99214 OFFICE O/P EST MOD 30 MIN: CPT | Performed by: INTERNAL MEDICINE

## 2024-08-19 PROCEDURE — 1036F TOBACCO NON-USER: CPT | Performed by: INTERNAL MEDICINE

## 2024-08-19 PROCEDURE — 3008F BODY MASS INDEX DOCD: CPT | Performed by: INTERNAL MEDICINE

## 2024-08-19 NOTE — LETTER
August 19, 2024     Ekaterina Flowers DO  [address] [facility] [address]    Patient: Patrick Mckeon   YOB: 1995   Date of Visit: 8/19/2024       Dear Dr. Ekaterina Flowers DO:    Thank you for referring Patrick Mckeon to me for evaluation. Below are my notes for this consultation.  If you have questions, please do not hesitate to call me. I look forward to following your patient along with you.       Sincerely,     Vivi Brown MD      CC: No Recipients  ______________________________________________________________________________________    08/19/24  3:43 PM    Vascular Medicine Follow-up    MR. Mckeon is a terrific 29 y.o. man seen in follow-up for renovascular HTN.  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     I have been managing his medication.  He is on on lisinopril 5 mg/day (escalated from 2.5 mg in May).    Since I saw him in May, BP has been good. He does notice exertional dyspnea sometimes walking up hill which is concerning to him. Does not always occur. Bps ~~115/72 mm Hg.  HE also has tachycardia sometimes walking up hill and his legs may feel weak (prior exercise ABIs normal).    He is doing fine with aspirin Rx.    Past Medical History:   Diagnosis Date   • Anxiety    • Depression    • GERD (gastroesophageal reflux disease)    • Hyperlipidemia    • Hypertension    • Renal artery stenosis (CMS-HCC)     Nep: MAN Moreno 8/2023   • Renal disease due to hypertension      Past Surgical History:   Procedure Laterality Date   • CT ABDOMEN PELVIS ANGIOGRAM W AND/OR WO IV CONTRAST  07/03/2023    CT ABDOMEN PELVIS ANGIOGRAM W AND/OR WO IV CONTRAST 7/3/2023 [facility]   • CT ANGIO NECK  07/25/2023    CT NECK ANGIO W AND WO IV CONTRAST 7/25/2023 [facility]   • CT HEAD ANGIO W AND WO IV  "CONTRAST  07/25/2023    CT HEAD ANGIO W AND WO IV CONTRAST 7/25/2023 [facility] CT   • INVASIVE VASCULAR PROCEDURE Bilateral 10/12/2023    Procedure: Upper Extremity Angiogram;  Surgeon: Jovany Kelly MD;  Location: [facility] Cardiac Cath Lab;  Service: Vascular Surgery;  Laterality: Bilateral;   • IR ANGIOGRAM RENAL BILATERAL Bilateral 08/15/2023    IR ANGIOGRAM RENAL BILATERAL 8/15/2023 [facility] SURG AIB [facility]GAC[facility]   • OTHER SURGICAL HISTORY  11/22/2022    Lipoma excision     Current Outpatient Medications   Medication Sig Dispense Refill   • aspirin 81 mg EC tablet Take 1 tablet (81 mg) by mouth once daily.     • cetirizine (ZyrTEC) 10 mg capsule Take 1 capsule (10 mg) by mouth once daily.     • escitalopram (Lexapro) 20 mg tablet Take 1 tablet (20 mg) by mouth once daily. 90 tablet 0   • lisinopril 5 mg tablet Take 1 tablet (5 mg) by mouth once daily. 90 tablet 3   • rosuvastatin (Crestor) 10 mg tablet TAKE 1 TABLET (10 MG) BY MOUTH ONCE DAILY AT BEDTIME. 90 tablet 1   • triamcinolone (Nasacort) 55 mcg nasal inhaler Administer 2 sprays into each nostril once daily.       No current facility-administered medications for this visit.     On examination:  Patient Vitals for the past 24 hrs:   BP Pulse Resp SpO2 Height Weight   08/19/24 1513 120/68 -- -- -- -- --   08/19/24 1444 140/90 -- -- -- -- --   08/19/24 1440 (!) 142/93 62 18 99 % 1.803 m (5' 11\") 64.1 kg (141 lb 4.8 oz)   BP high initially, on my repeat was 120/69 mm Hg  He is in no distress  HEENT benign  Breathing non labored  He was alert, oriented, fluid speech  Appropriate affect    4.2024 Labs  Component      Latest Ref Rng 4/4/2024   GLUCOSE      74 - 99 mg/dL 87    SODIUM      136 - 145 mmol/L 139    POTASSIUM      3.5 - 5.3 mmol/L 4.1    CHLORIDE      98 - 107 mmol/L 103    Bicarbonate      21 - 32 mmol/L 28    Anion Gap      10 - 20 mmol/L 12    Blood Urea Nitrogen      6 - 23 mg/dL 10    Creatinine      0.50 - 1.30 mg/dL 0.85    EGFR      >60 mL/min/1.73m*2 >90  " "  Calcium      8.6 - 10.6 mg/dL 9.4    PHOSPHORUS      2.5 - 4.9 mg/dL 2.9    Albumin      3.4 - 5.0 g/dL 4.6        Prior imaging   Renal duplex 1.2.2024  Normal velocities in both renal arteries/conduit  Right kidney 11.9, left 11.9  Slightly monophasic flow left renal vein    4.3.2024 Ambulatory BP monitor  51 successful readings  Average ambulatory blood pressure was 120/80 mmHg with a heart rate of 67 beats per minute.   The highest SBP and DBP was 139 at 10:40 and 103 at 20:20.   The lowest SBP and DBP was 78 at 9:41 and 42 at 9:41.   From 8 a.m. to 11 p.m., average blood pressure was 122/83 mmHg with average heart rate of 67 beats per minute.   From 11 p.m. to 8 a.m., average blood pressure was 109//63 mmHg with a heart rate of 68 beats per minute.   Patient went to bed at unknown and woke up at unknown.  Patient reported symptoms: None      Impression: Average 24-hour ambulatory blood pressure of 120/80 mmHg indicates that systolic/diastolic blood pressure is adequately controlled. Patient has normal nocturnal BP dipping. No white coat effect.  Recommendation: Management per referring clinician.       7.2023 CTA head/neck  No aneurysms, FMD, dissections    Surgical pathology intimal hyperplasia  A. AORTA:   -- Muscular artery with disordered architecture, see note.                   Note: Clinical correlation is recommended.   B.  RIGHT RENAL ARTERY:   -- Muscular artery with disordered intimal hyperplasia, consistent with clinical diagnosis of \"fibromuscular dysplasia\".   C.  LEFT RENAL ARTERY:  --Muscular artery with disordered intimal hyperplasia, consistent with clinical diagnosis of \"fibromuscular dysplasia.    Echos done 2019 and 2021 (outside studies)  Normal LV function  Trace MR/TR (former seen only on 2019 exam).    Assessment/Plan: 29 y.o. man with renovascular HTN s/p complex revascularization in November, 2023 including rx for left renal aneurysm.  Pathology is suggestive of possible FMD variant " (intimal hyperplasia is not classical intimal fibroplasia) vs. Other degenerative/fibrotic process. He has Alport's syndrome, may or may not at all be related.    He is doing well with BP at rest, but I'm a little concerned about exertional dyspnea climbing up hill.   I will obtain [facility] Echo to r/o structural heart disease/congenital heart disease and exercise treadmill test to assess BP response to exercise and r/o unlikely ischemia. He will continue same dose lisinopril for now (5 mg); he was previously on 20 mg/day. He will continue low dose aspirin.  He continues statin for hyperlipidemia; I really don't see significant atheroscleroses on prior CTA head/neck.    RTC 6 months; interval follow-up with Dr. Kelly in the fall and renal duplex a that time.      Vivi Brown MD

## 2024-08-19 NOTE — PATIENT INSTRUCTIONS
Continue medications as prescribed.  Ordered stress test and heart ECHO. We will reach out with results.    See you back in 6 months virtually for check in.      Vivi Brown MD  Co-Director, Vascular Center  [facility] [place], [facility]   [facility] Master Clinician in Fibromuscular Dysplasia and Vascular Care  Professor of Medicine  [facility]

## 2024-08-19 NOTE — PROGRESS NOTES
08/19/24  3:43 PM    Vascular Medicine Follow-up    MR. Mckeon is a terrific 29 y.o. man seen in follow-up for renovascular HTN.  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     I have been managing his medication.  He is on on lisinopril 5 mg/day (escalated from 2.5 mg in May).    Since I saw him in May, BP has been good. He does notice exertional dyspnea sometimes walking up hill which is concerning to him. Does not always occur. Bps ~~115/72 mm Hg.  HE also has tachycardia sometimes walking up hill and his legs may feel weak (prior exercise ABIs normal).    He is doing fine with aspirin Rx.    Past Medical History:   Diagnosis Date    Anxiety     Depression     GERD (gastroesophageal reflux disease)     Hyperlipidemia     Hypertension     Renal artery stenosis (CMS-HCC)     Nep: MAN Moreno 8/2023    Renal disease due to hypertension      Past Surgical History:   Procedure Laterality Date    CT ABDOMEN PELVIS ANGIOGRAM W AND/OR WO IV CONTRAST  07/03/2023    CT ABDOMEN PELVIS ANGIOGRAM W AND/OR WO IV CONTRAST 7/3/2023 [facility] CT    CT ANGIO NECK  07/25/2023    CT NECK ANGIO W AND WO IV CONTRAST 7/25/2023 [facility] CT    CT HEAD ANGIO W AND WO IV CONTRAST  07/25/2023    CT HEAD ANGIO W AND WO IV CONTRAST 7/25/2023 [facility] CT    INVASIVE VASCULAR PROCEDURE Bilateral 10/12/2023    Procedure: Upper Extremity Angiogram;  Surgeon: Jovany Kelly MD;  Location: [facility] Cardiac Cath Lab;  Service: Vascular Surgery;  Laterality: Bilateral;    IR ANGIOGRAM RENAL BILATERAL Bilateral 08/15/2023    IR ANGIOGRAM RENAL BILATERAL 8/15/2023 [facility]G AIB [facility]    OTHER SURGICAL HISTORY  11/22/2022    Lipoma excision     Current Outpatient Medications   Medication Sig Dispense Refill    aspirin 81 mg EC tablet Take 1 tablet (81 mg) by mouth once daily.       "cetirizine (ZyrTEC) 10 mg capsule Take 1 capsule (10 mg) by mouth once daily.      escitalopram (Lexapro) 20 mg tablet Take 1 tablet (20 mg) by mouth once daily. 90 tablet 0    lisinopril 5 mg tablet Take 1 tablet (5 mg) by mouth once daily. 90 tablet 3    rosuvastatin (Crestor) 10 mg tablet TAKE 1 TABLET (10 MG) BY MOUTH ONCE DAILY AT BEDTIME. 90 tablet 1    triamcinolone (Nasacort) 55 mcg nasal inhaler Administer 2 sprays into each nostril once daily.       No current facility-administered medications for this visit.     On examination:  Patient Vitals for the past 24 hrs:   BP Pulse Resp SpO2 Height Weight   08/19/24 1513 120/68 -- -- -- -- --   08/19/24 1444 140/90 -- -- -- -- --   08/19/24 1440 (!) 142/93 62 18 99 % 1.803 m (5' 11\") 64.1 kg (141 lb 4.8 oz)   BP high initially, on my repeat was 120/69 mm Hg  He is in no distress  HEENT benign  Breathing non labored  He was alert, oriented, fluid speech  Appropriate affect    4.2024 Labs  Component      Latest Ref Rng 4/4/2024   GLUCOSE      74 - 99 mg/dL 87    SODIUM      136 - 145 mmol/L 139    POTASSIUM      3.5 - 5.3 mmol/L 4.1    CHLORIDE      98 - 107 mmol/L 103    Bicarbonate      21 - 32 mmol/L 28    Anion Gap      10 - 20 mmol/L 12    Blood Urea Nitrogen      6 - 23 mg/dL 10    Creatinine      0.50 - 1.30 mg/dL 0.85    EGFR      >60 mL/min/1.73m*2 >90    Calcium      8.6 - 10.6 mg/dL 9.4    PHOSPHORUS      2.5 - 4.9 mg/dL 2.9    Albumin      3.4 - 5.0 g/dL 4.6        Prior imaging   Renal duplex 1.2.2024  Normal velocities in both renal arteries/conduit  Right kidney 11.9, left 11.9  Slightly monophasic flow left renal vein    4.3.2024 Ambulatory BP monitor  51 successful readings  Average ambulatory blood pressure was 120/80 mmHg with a heart rate of 67 beats per minute.   The highest SBP and DBP was 139 at 10:40 and 103 at 20:20.   The lowest SBP and DBP was 78 at 9:41 and 42 at 9:41.   From 8 a.m. to 11 p.m., average blood pressure was 122/83 mmHg " "with average heart rate of 67 beats per minute.   From 11 p.m. to 8 a.m., average blood pressure was 109//63 mmHg with a heart rate of 68 beats per minute.   Patient went to bed at unknown and woke up at unknown.  Patient reported symptoms: None      Impression: Average 24-hour ambulatory blood pressure of 120/80 mmHg indicates that systolic/diastolic blood pressure is adequately controlled. Patient has normal nocturnal BP dipping. No white coat effect.  Recommendation: Management per referring clinician.       7.2023 CTA head/neck  No aneurysms, FMD, dissections    Surgical pathology intimal hyperplasia  A. AORTA:   -- Muscular artery with disordered architecture, see note.                   Note: Clinical correlation is recommended.   B.  RIGHT RENAL ARTERY:   -- Muscular artery with disordered intimal hyperplasia, consistent with clinical diagnosis of \"fibromuscular dysplasia\".   C.  LEFT RENAL ARTERY:  --Muscular artery with disordered intimal hyperplasia, consistent with clinical diagnosis of \"fibromuscular dysplasia.    Echos done 2019 and 2021 (outside studies)  Normal LV function  Trace MR/TR (former seen only on 2019 exam).    Assessment/Plan: 29 y.o. man with renovascular HTN s/p complex revascularization in November, 2023 including rx for left renal aneurysm.  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 Other degenerative/fibrotic process. He has Alport's syndrome, may or may not at all be related.    He is doing well with BP at rest, but I'm a little concerned about exertional dyspnea climbing up hill.   I will obtain  Echo to r/o structural heart disease/congenital heart disease and exercise treadmill test to assess BP response to exercise and r/o unlikely ischemia. He will continue same dose lisinopril for now (5 mg); he was previously on 20 mg/day. He will continue low dose aspirin.  He continues statin for hyperlipidemia; I really " don't see significant atheroscleroses on prior CTA head/neck.    RTC 6 months; interval follow-up with Dr. Kelly in the fall and renal duplex a that time.      Vivi Brown MD

## 2024-08-27 NOTE — PROGRESS NOTES
HPI    This is a 29 year old male who presents today virtually for microscopic hematuria. Has had 2 episodes of microscopic hematuria in 4/2022 and 11/2022. Does not have gross hematuria. Denies any urinary symptoms. No family history of PCa. Denies smoking or chemical exposure. Seen in consult 1/18/23.     11/16/22 UA - blood in urine 2+   11/16/22 microUA - RBC 11/hpf     4/9/22 UA - blood in urine 1+  4/9/22 microUA - RBC 4/hpf     1/30/23 - Presents today for cystoscopy. Doing well. Has not got US yet, cysto nl     RBUS 1/2023 - bosniak 2 renal cyst, otherwise neg     2/15/23 - seen over THV to discuss US    8/28/24 -seen in follow-up.  Underwent right renal artery bypass and left renal artery aneurysm repair in 11/2023.  Diagnosed with Alport syndrome, likely the cause of his microscopic hematuria.  Interested in having vasectomy, has discussed with Dr. LOERA but not yet done.  Ultrasound not yet done.        Current Medications:  Current Outpatient Medications   Medication Sig Dispense Refill    aspirin 81 mg EC tablet Take 1 tablet (81 mg) by mouth once daily.      cetirizine (ZyrTEC) 10 mg capsule Take 1 capsule (10 mg) by mouth once daily.      escitalopram (Lexapro) 20 mg tablet Take 1 tablet (20 mg) by mouth once daily. 90 tablet 0    lisinopril 5 mg tablet Take 1 tablet (5 mg) by mouth once daily. 90 tablet 3    rosuvastatin (Crestor) 10 mg tablet TAKE 1 TABLET (10 MG) BY MOUTH ONCE DAILY AT BEDTIME. 90 tablet 1    triamcinolone (Nasacort) 55 mcg nasal inhaler Administer 2 sprays into each nostril once daily.       No current facility-administered medications for this visit.        Active Problems:  Patrick Mckeon is a 29 y.o. male with the following Problems and Medications.  Patient Active Problem List   Diagnosis    Alport syndrome (HHS-HCC)    Claudication (CMS-HCC)    Connective tissue disorder (Multi)    Elevated liver enzymes    BO (generalized anxiety disorder)    Ganglion cyst    Mixed  hyperlipidemia    Lipoma of torso    Recurrent microscopic hematuria    Paresthesia of right arm    Renal arterial aneurysm (CMS-HCC)    Renal artery stenosis (CMS-HCC)    Renal cyst, left    Renovascular hypertension    Subjective pulsatile tinnitus of right ear    Vitamin D deficiency    Essential hypertension    Moderate episode of recurrent major depressive disorder (Multi)    Fibromuscular dysplasia of bilateral renal arteries (CMS-HCC)    Middle aortic syndrome (Multi)    Aneurysm of left renal artery (CMS-HCC)    Shortness of breath     Current Outpatient Medications   Medication Sig Dispense Refill    aspirin 81 mg EC tablet Take 1 tablet (81 mg) by mouth once daily.      cetirizine (ZyrTEC) 10 mg capsule Take 1 capsule (10 mg) by mouth once daily.      escitalopram (Lexapro) 20 mg tablet Take 1 tablet (20 mg) by mouth once daily. 90 tablet 0    lisinopril 5 mg tablet Take 1 tablet (5 mg) by mouth once daily. 90 tablet 3    rosuvastatin (Crestor) 10 mg tablet TAKE 1 TABLET (10 MG) BY MOUTH ONCE DAILY AT BEDTIME. 90 tablet 1    triamcinolone (Nasacort) 55 mcg nasal inhaler Administer 2 sprays into each nostril once daily.       No current facility-administered medications for this visit.       PMH:  Past Medical History:   Diagnosis Date    Anxiety     Depression     GERD (gastroesophageal reflux disease)     Hyperlipidemia     Hypertension     Renal artery stenosis (CMS-HCC)     Nep: MAN Moreno 8/2023    Renal disease due to hypertension        PSH:  Past Surgical History:   Procedure Laterality Date    CT ABDOMEN PELVIS ANGIOGRAM W AND/OR WO IV CONTRAST  07/03/2023    CT ABDOMEN PELVIS ANGIOGRAM W AND/OR WO IV CONTRAST 7/3/2023 [facility] CT    CT ANGIO NECK  07/25/2023    CT NECK ANGIO W AND WO IV CONTRAST 7/25/2023 [facility] CT    CT HEAD ANGIO W AND WO IV CONTRAST  07/25/2023    CT HEAD ANGIO W AND WO IV CONTRAST 7/25/2023 [facility] CT    INVASIVE VASCULAR PROCEDURE Bilateral 10/12/2023    Procedure: Upper Extremity  Angiogram;  Surgeon: Jovany Kelly MD;  Location: [facility] Cardiac Cath Lab;  Service: Vascular Surgery;  Laterality: Bilateral;    IR ANGIOGRAM RENAL BILATERAL Bilateral 08/15/2023    IR ANGIOGRAM RENAL BILATERAL 8/15/2023 [facility] SUR[facility]    OTHER SURGICAL HISTORY  11/22/2022    Lipoma excision       FMH:  Family History   Problem Relation Name Age of Onset    Coronary artery disease Father      Hyperlipidemia Father      Coronary artery disease Father's Sister      Other (CABG) Father's Sister      Coronary artery disease Paternal Grandmother      Heart attack Paternal Grandmother      Coronary artery disease Paternal Grandfather      Heart attack Paternal Grandfather         SHx:  Social History     Tobacco Use    Smoking status: Never    Smokeless tobacco: Never   Vaping Use    Vaping status: Never Used   Substance Use Topics    Alcohol use: Not Currently     Comment: rare    Drug use: Never       Allergies:  Allergies   Allergen Reactions    Amlodipine Swelling     swelling in lower extremities       Assessment/Plan  Will bring in will obtain follow-up ultrasound of his kidneys and meet back in 4 to 6 weeks over telehealth to review.    We discussed that vasectomy is intended to be a permanent form of contraception, and that while options for reversal exist they are often costly, time-intensive, and not guaranteed to produce ejaculate with motile sperm.      We discussed that vasectomy does not produce immediate sterility, and following vasectomy another form of contraception is required until vas occlusion is confirmed by post-vasectomy semen analysis. We discussed the post-procedural pathway, including the recommendation to abstain from ejaculation for 1 week after vasectomy as well as post-operative activity restrictions and care. We discussed that 8-16 weeks after vasectomy is the appropriate time for the first PVSA.      We discussed that even after vas occlusion is confirmed, vasectomy is not 100%  reliable in preventing regrowth due to the risk of recannulation. The risk of pregnancy after vasectomy is approximately 1/2,000 in men who have documented post-vasectomy azoospermia or rare non-motile sperm.      We discussed the less than 1% risk of repeat vasectomy to achieve sterility. We discussed the rate of procedural complications including symptomatic hematoma and infection of 1-2%. We discussed the risk of chronic scrotal pain in about 1-2% of men. We discussed other options for permanent and non-permanent contraception including barrier methods, IUD, oral contraception, and tubal ligation.     Will follow-up in 4 to 6 weeks as above.  Will schedule vasectomy at his convenience.      Scribe Attestation  By signing my name below, IJennifer Scribe attest that this documentation has been prepared under the direction and in the presence of Rudy Sorenson MD.

## 2024-08-28 ENCOUNTER — OFFICE VISIT (OUTPATIENT)
Dept: UROLOGY | Facility: HOSPITAL | Age: 29
End: 2024-08-28
Payer: COMMERCIAL

## 2024-08-28 DIAGNOSIS — N28.1 RENAL CYST, LEFT: ICD-10-CM

## 2024-08-28 DIAGNOSIS — Z30.09 VASECTOMY EVALUATION: Primary | ICD-10-CM

## 2024-08-28 PROCEDURE — 99213 OFFICE O/P EST LOW 20 MIN: CPT | Performed by: UROLOGY

## 2024-09-03 ENCOUNTER — HOSPITAL ENCOUNTER (OUTPATIENT)
Dept: CARDIOLOGY | Facility: HOSPITAL | Age: 29
Discharge: HOME | End: 2024-09-03
Payer: COMMERCIAL

## 2024-09-03 DIAGNOSIS — M31.4: ICD-10-CM

## 2024-09-03 DIAGNOSIS — I35.9 NONRHEUMATIC AORTIC VALVE DISORDER, UNSPECIFIED: ICD-10-CM

## 2024-09-03 DIAGNOSIS — R06.02 SHORTNESS OF BREATH: ICD-10-CM

## 2024-09-03 PROCEDURE — 93306 TTE W/DOPPLER COMPLETE: CPT

## 2024-09-03 PROCEDURE — 93017 CV STRESS TEST TRACING ONLY: CPT

## 2024-09-03 PROCEDURE — 93016 CV STRESS TEST SUPVJ ONLY: CPT | Performed by: INTERNAL MEDICINE

## 2024-09-03 PROCEDURE — 93018 CV STRESS TEST I&R ONLY: CPT | Performed by: INTERNAL MEDICINE

## 2024-09-03 PROCEDURE — 93306 TTE W/DOPPLER COMPLETE: CPT | Performed by: INTERNAL MEDICINE

## 2024-09-04 LAB
AORTIC VALVE MEAN GRADIENT: 2.5 MMHG
AORTIC VALVE PEAK VELOCITY: 1.05 M/S
AV PEAK GRADIENT: 4.4 MMHG
AVA (PEAK VEL): 2.17 CM2
AVA (VTI): 2.08 CM2
EJECTION FRACTION APICAL 4 CHAMBER: 67.2
EJECTION FRACTION: 70 %
LEFT ATRIUM VOLUME AREA LENGTH INDEX BSA: 14.7 ML/M2
LEFT VENTRICLE INTERNAL DIMENSION DIASTOLE: 4.58 CM (ref 3.5–6)
LEFT VENTRICULAR OUTFLOW TRACT DIAMETER: 2.03 CM
MITRAL VALVE E/A RATIO: 1.75
RIGHT VENTRICLE PEAK SYSTOLIC PRESSURE: 22.8 MMHG
TRICUSPID ANNULAR PLANE SYSTOLIC EXCURSION: 2.1 CM

## 2024-09-11 ENCOUNTER — HOSPITAL ENCOUNTER (OUTPATIENT)
Dept: RADIOLOGY | Facility: HOSPITAL | Age: 29
Discharge: HOME | End: 2024-09-11
Payer: COMMERCIAL

## 2024-09-11 DIAGNOSIS — N28.1 RENAL CYST, LEFT: ICD-10-CM

## 2024-09-11 PROCEDURE — 76770 US EXAM ABDO BACK WALL COMP: CPT | Performed by: RADIOLOGY

## 2024-09-11 PROCEDURE — 76770 US EXAM ABDO BACK WALL COMP: CPT

## 2024-09-25 ENCOUNTER — APPOINTMENT (OUTPATIENT)
Dept: UROLOGY | Facility: HOSPITAL | Age: 29
End: 2024-09-25
Payer: COMMERCIAL

## 2024-09-25 DIAGNOSIS — Z30.09 VASECTOMY EVALUATION: Primary | ICD-10-CM

## 2024-09-25 PROCEDURE — 99214 OFFICE O/P EST MOD 30 MIN: CPT | Performed by: STUDENT IN AN ORGANIZED HEALTH CARE EDUCATION/TRAINING PROGRAM

## 2024-09-25 NOTE — PROGRESS NOTES
Subjective   Patient ID: Patrick Mckeon is a 29 y.o. male    HPI  Today's visit was done virtually after appropriate consent from the patient.    29 y.o. male who presents today for evaluation of blood in the urine. The patient has previously seen Dr. Sorenson and Dr. LOERA for this issue. A cystoscopy was performed, but no source of bleeding was identified. A renal ultrasound was also conducted, revealing a small 1.3 cm cyst on the kidney, which is non-cancerous and filled with fluid. The patient was reassured that this cyst is very common and does not have the potential to turn into cancer. The patient was advised to have periodic renal ultrasounds every couple of years to monitor the cyst size.    The patient is also interested in discussing a vasectomy. The procedure, potential complications, and post-operative care were thoroughly explained. The patient was informed that vasectomy is considered an irreversible procedure, although reversal is possible but costly and not covered by insurance. The patient was advised to consider sperm banking before the procedure if there is any uncertainty about future fertility.    The most recent Renal US, conducted on 9/11/2024, revealed:  No hydronephrosis bilaterally.      Left renal lower pole 1.3 cm cyst.      No focal urinary bladder abnormality identified.  No significant  postvoid residual.      Mildly enlarged prostate.        Review of Systems    All systems were reviewed. Anything negative was noted in the HPI.    Objective   Physical Exam  Constitutional:       Appearance: Normal appearance.   HENT:      Head: Normocephalic and atraumatic.      Nose: Nose normal.   Pulmonary:      Effort: No respiratory distress.   Abdominal:      General: There is no distension.   Neurological:      Mental Status: He is alert.             Past Medical History:   Diagnosis Date    Anxiety     Depression     GERD (gastroesophageal reflux disease)     Hyperlipidemia     Hypertension      Renal artery stenosis (CMS-HCC)     Nep: Fabian Stokes, LOV 8/2023    Renal disease due to hypertension          Past Surgical History:   Procedure Laterality Date    CT ABDOMEN PELVIS ANGIOGRAM W AND/OR WO IV CONTRAST  07/03/2023    CT ABDOMEN PELVIS ANGIOGRAM W AND/OR WO IV CONTRAST 7/3/2023 [facility]    CT ANGIO NECK  07/25/2023    CT NECK ANGIO W AND WO IV CONTRAST 7/25/2023 [facility]    CT HEAD ANGIO W AND WO IV CONTRAST  07/25/2023    CT HEAD ANGIO W AND WO IV CONTRAST 7/25/2023 [facility]    INVASIVE VASCULAR PROCEDURE Bilateral 10/12/2023    Procedure: Upper Extremity Angiogram;  Surgeon: Jovany Kelly MD;  Location: [facility];  Service: Vascular Surgery;  Laterality: Bilateral;    IR ANGIOGRAM RENAL BILATERAL Bilateral 08/15/2023    IR ANGIOGRAM RENAL BILATERAL 8/15/2023 [facility]    OTHER SURGICAL HISTORY  11/22/2022    Lipoma excision           Assessment/Plan   Microscopic hematuria, Renal cyst, Vasectomy evaluation    29 y.o. male who presents for the above condition, Vasectomy Evaluation     Patient presents today for evaluation of vasectomy. We had an extensive discussion about the procedure and post-procedure protocol. We discussed that vasectomy is intended to be a permanent form of contraception. There are options for fertility post vasectomy, including vasectomy reversal and sperm retrieval but these are not always successful and can be rather expensive.  We highlighted that it is not an immediate form of contraception, and that another form is required until vas occlusion is confirmed with a post-vasectomy semen analysis. We recommend that the patient refrain from ejaculation for 1 week post-procedure and we would be checking a post-vasectomy semen analysis in 2-3 months, or 15-20 ejaculates. Need for repeat vasectomy is very low, <1%. There is a very small risk for pregnancy after vasectomy (1 in 2,000). We define a successful vasectomy by achieving azoospermia or less than 100,000  non-motile sperm on post-vasectomy semen analysis.  We discussed that the procedure would be done in the office under local anesthesia. Complications were discussed including but not limited to pain, which can be chronic in nature, bleeding/hematoma formation, and infection. We recommended ice and rest for the next 48-72hrs. Patient may be prescribed an anti-inflammatory for pain control. Patient is instructed to refrain from strenuous activity for 2 weeks.  No barriers to learning were identified. After all of the patient’s questions were satisfactorily answered, he expressed understanding of the risks of surgery and wishes to proceed with vasectomy.        Plan:  - Follow up in 6 months with renal US  - Pt will call to schedule a vasectomy if decided        9/25/2024    Scribe Attestation  By signing my name below, I, Dav Small, Scribe   attest that this documentation has been prepared under the direction and in the presence of Dr. Flavio Perry

## 2024-09-26 DIAGNOSIS — F33.1 MODERATE EPISODE OF RECURRENT MAJOR DEPRESSIVE DISORDER: ICD-10-CM

## 2024-09-27 RX ORDER — ESCITALOPRAM OXALATE 20 MG/1
20 TABLET ORAL DAILY
Qty: 90 TABLET | Refills: 1 | Status: SHIPPED | OUTPATIENT
Start: 2024-09-27

## 2024-10-08 ENCOUNTER — OFFICE VISIT (OUTPATIENT)
Dept: VASCULAR SURGERY | Facility: CLINIC | Age: 29
End: 2024-10-08
Payer: COMMERCIAL

## 2024-10-08 ENCOUNTER — ANCILLARY PROCEDURE (OUTPATIENT)
Dept: VASCULAR MEDICINE | Facility: CLINIC | Age: 29
End: 2024-10-08
Payer: COMMERCIAL

## 2024-10-08 VITALS — SYSTOLIC BLOOD PRESSURE: 118 MMHG | DIASTOLIC BLOOD PRESSURE: 62 MMHG | HEART RATE: 61 BPM

## 2024-10-08 DIAGNOSIS — I77.3 FIBROMUSCULAR DYSPLASIA OF BILATERAL RENAL ARTERIES (CMS-HCC): ICD-10-CM

## 2024-10-08 DIAGNOSIS — I15.0 RENOVASCULAR HYPERTENSION: Primary | ICD-10-CM

## 2024-10-08 DIAGNOSIS — I72.2 ANEURYSM OF LEFT RENAL ARTERY (CMS-HCC): ICD-10-CM

## 2024-10-08 DIAGNOSIS — I15.0 RENOVASCULAR HYPERTENSION: ICD-10-CM

## 2024-10-08 PROCEDURE — 1036F TOBACCO NON-USER: CPT | Performed by: SURGERY

## 2024-10-08 PROCEDURE — 99212 OFFICE O/P EST SF 10 MIN: CPT | Performed by: SURGERY

## 2024-10-08 PROCEDURE — 3074F SYST BP LT 130 MM HG: CPT | Performed by: SURGERY

## 2024-10-08 PROCEDURE — 93975 VASCULAR STUDY: CPT | Performed by: SURGERY

## 2024-10-08 PROCEDURE — 93975 VASCULAR STUDY: CPT

## 2024-10-08 PROCEDURE — 3078F DIAST BP <80 MM HG: CPT | Performed by: SURGERY

## 2024-10-08 ASSESSMENT — PATIENT HEALTH QUESTIONNAIRE - PHQ9
1. LITTLE INTEREST OR PLEASURE IN DOING THINGS: NOT AT ALL
2. FEELING DOWN, DEPRESSED OR HOPELESS: NOT AT ALL
SUM OF ALL RESPONSES TO PHQ9 QUESTIONS 1 AND 2: 0

## 2024-10-08 ASSESSMENT — ENCOUNTER SYMPTOMS
OCCASIONAL FEELINGS OF UNSTEADINESS: 0
LOSS OF SENSATION IN FEET: 0
DEPRESSION: 0

## 2024-10-08 ASSESSMENT — PAIN SCALES - GENERAL: PAINLEVEL: 0-NO PAIN

## 2024-10-08 NOTE — PROGRESS NOTES
The patient reports no problems with controlling his blood pressure.  He is down to 5 mg of lisinopril daily.  He was on 20 preoperatively.  They did try to eliminate this but he developed rebound hypertension.  His duplex done today shows both kidneys to be well-perfused.  The right sided bypass has velocity elevations a little above 300 cm/s.  I think this is due to normal scarring and healing in this area which will stiffen the graft and resultant velocity elevation.  We will continue to watch this.  I did discuss with him the findings that would suggest the need for reintervention which would include progression of this velocity elevation and development of increased need for antihypertensive medication.  I am going to bring him back in 6 months for recheck.  His physical exam is benign and his abdomen nontender.

## 2024-10-11 ENCOUNTER — APPOINTMENT (OUTPATIENT)
Dept: AUDIOLOGY | Facility: CLINIC | Age: 29
End: 2024-10-11
Payer: COMMERCIAL

## 2024-10-11 ENCOUNTER — APPOINTMENT (OUTPATIENT)
Dept: OTOLARYNGOLOGY | Facility: CLINIC | Age: 29
End: 2024-10-11
Payer: COMMERCIAL

## 2024-10-11 VITALS — BODY MASS INDEX: 21.19 KG/M2 | TEMPERATURE: 97.6 F | HEIGHT: 70 IN | WEIGHT: 148 LBS

## 2024-10-11 DIAGNOSIS — Q87.81 ALPORT SYNDROME (HHS-HCC): ICD-10-CM

## 2024-10-11 DIAGNOSIS — H61.23 BILATERAL IMPACTED CERUMEN: Primary | ICD-10-CM

## 2024-10-11 PROCEDURE — 1036F TOBACCO NON-USER: CPT | Performed by: NURSE PRACTITIONER

## 2024-10-11 PROCEDURE — 99203 OFFICE O/P NEW LOW 30 MIN: CPT | Performed by: NURSE PRACTITIONER

## 2024-10-11 PROCEDURE — 3008F BODY MASS INDEX DOCD: CPT | Performed by: NURSE PRACTITIONER

## 2024-10-11 ASSESSMENT — PATIENT HEALTH QUESTIONNAIRE - PHQ9
2. FEELING DOWN, DEPRESSED OR HOPELESS: NOT AT ALL
SUM OF ALL RESPONSES TO PHQ9 QUESTIONS 1 AND 2: 0
1. LITTLE INTEREST OR PLEASURE IN DOING THINGS: NOT AT ALL

## 2024-10-11 NOTE — PROGRESS NOTES
Subjective   Patient ID: Patrick Mckeon is a 29 y.o. male who presents for Cerumen Impaction (Before high frequency hearing test).  HPI  This patient is referred for cerumen removal prior to an audiogram.  The patient is not accompanied by anyone.   When asked about ear pain, hearing loss, itching, discharge from ear, tinnitus, aural fullness or autophony, the patient admits to none, but his partner expresses concern about hearing loss and he has a history of Alport syndrome.   The patient does not wear a hearing aid.  When asked about a significant past otological history including history of prior ear surgery, noise exposure, exposure to ototoxic drugs or agents, and/or family history of hearing loss, the patient admits to none.    Review of Systems  A comprehensive or 10 points review of the patient's constitutional, neurological, HEENT, pulmonary, cardiovascular and genito-urinary systems showed only those mentioned in history of present illness.    Objective   Physical Exam  Constitutional: no fever, chills, weight loss or weight gain   General appearance: Appears well, well-nourished, well groomed. No acute distress.   Communication: Normal communication   Psychiatric: Oriented to person, place and time. Normal mood and affect.   Neurologic: Cranial nerves II-XII grossly intact and symmetric bilaterally.   Head and Face:   Head: Atraumatic with no masses, lesions or scarring.   Face: Normal symmetry, no paralysis, synkinesis or facial tic. No scars or deformities.     Eyes: Conjunctiva not edematous or erythematous   Ears: External inspection of ears with no deformity, scars or masses.  Bilateral canals with cerumen impactions     Neck: Normal appearing, symmetric, trachea midline.   Cardiovascular: Examination of peripheral vascular system shows no clubbing or cyanosis.   Respiratory: No respiratory distress increased work of breathing. Inspection of the chest with symmetric chest expansion and normal  respiratory effort.   Skin: No rashes in the head or neck    Assessment/Plan     This patient presents for initial evaluation of acute acquired bilateral cerumen impaction as well as chronic Alport syndrome.    Reassurance given that otologic exam is normal after cleaning.  He is scheduled for an audiogram 10/25/2024.  If there are any concerning findings, I am happy to review testing over the phone.  Otherwise, he may follow-up with me as needed.  All questions were answered to patient's satisfaction.    This note was created using speech recognition transcription software. Despite proofreading, several typographical errors might be present that might affect the meaning of the content. Please call with any questions.   Patient ID: Patrick Mckeon is a 29 y.o. male.    Ear cerumen removal    Date/Time: 10/11/2024 10:00 AM    Performed by: BOZENA Ortiz  Authorized by: BOZENA Ortiz    Consent:     Consent obtained:  Verbal    Consent given by:  Patient    Risks discussed:  Pain    Alternatives discussed:  No treatment  Procedure details:     Location:  L ear and R ear    Procedure type comment:  Suction    Procedure outcomes: cerumen removed    Post-procedure details:     Inspection:  No bleeding, ear canal clear and TM intact    Hearing quality:  Normal    Procedure completion:  Tolerated well, no immediate complications      BOZENA Ortiz 10/11/24 9:58 AM

## 2024-10-14 NOTE — PROGRESS NOTES
"    ADULT AUDIOMETRIC EVALUATION      Name:  Patrick Mckeon  :  1995  Age:  29 y.o.  Date of Evaluation:  10/25/2024    IMPRESSIONS     Today's test results are consistent with normal hearing sensitivity, bilaterally. Results are consistent with most recent hearing evaluation performed on 2024. Discussed results and recommendations with patient.  Questions were addressed and the patient was encouraged to contact our department should concerns arise.    RECOMMENDATIONS     Continue medical follow up with PCP/ENT.  Return for hearing evaluation in 6 months or sooner should concerns arise.   Consider scheduling baseline Balance Function Test (BFT) in 6 months or sooner should dizziness symptoms continue or increase.    Time: 3640-6063    HISTORY     Patrick Mckeon is seen today at the request of Ekaterina Flowers DO for an evaluation of hearing. Patient has a known history of Alport Syndrome. Most recently seen on 2024 where he reported bilateral intermittent tinnitus sometimes described as a \"whoosh\" as well as a \"ting/ringing\". He also noted at that time an increase sensitivity to noise including startling to dishes clanging. He also reported symptoms of dizziness described as \"visual movement\" of objects moving away from him. For example, while walking his visual field will often jump further away from him for moments at a time. Hearing evaluation on that date revealed normal hearing sensitivity, bilaterally.     Today, patient reported no significant changes in hearing or tinnitus. Patient does note that he has difficulty hearing his name in group settings where others are calling for him. His tinnitus continues to be intermittent. He has noted some rare lightheadedness with presyncope lasting a few seconds at a time. These symptoms have no specific triggers. Overall has occurred roughly 2-3 times. Patient denied history of aural fullness or excessive noise exposure. No history of " hearing aid use.    TEST RESULTS     Otoscopic Evaluation:  Physical exam to evaluate the outer ear  Right Ear: Clear ear canal.  Left Ear: Clear ear canal.    Tympanometry & Acoustic Reflexes:  Assesses the function of the middle ear and inner ear structures  Right Ear: Tympanometry revealed normal ear canal volume, peak pressure, and compliance, consistent with normal middle ear function (Type A). Ipsilateral Acoustic Reflexes were present 500-2000 Hz.   Left Ear: Tympanometry revealed normal ear canal volume, peak pressure, and compliance, consistent with normal middle ear function (Type A). Ipsilateral Acoustic Reflexes were present 500-4000 Hz.     Distortion Product Otoacoustic Emissions: Assesses the cochlear outer hair cell function (2000-10,000 Hz frequency range)  Right Ear:  Present OAEs suggesting normal to near normal cochlear outer hair cell function from 1327-9221 Hz.  Left Ear:   Present OAEs suggesting normal to near normal cochlear outer hair cell function from 4271-2607 Hz.    Pure Tone Audiometry: Conventional Audiometry via TDH Headphones with good reliability  Right Ear: Hearing sensitivity WNL.   Left Ear: Hearing sensitivity WNL.   Baseline high frequency audiometry with high frequency headphones tested from 9,000 Hz - 20,000 Hz indicated hearing sensitivity WNL from 9000-52746 Hz sloping to a profound (assumed) SNHL, bilaterally.    Speech Audiometry:   Right Ear: Speech Reception Threshold (SRT) was obtained at 5 dBHL. Speech reception threshold in agreement with pure tone average. Word Recognition scores were excellent (100%) in quiet when words were presented at 55 dBHL.   Left Ear: Speech Reception Threshold (SRT) was obtained at 0 dBHL. Speech reception threshold in agreement with pure tone average. Word Recognition scores were excellent (100%) in quiet when words were presented at 55 dBHL.   Binaural Speech in Noise testing: QuickSIN revealed mild degree of SNR loss.       Testing and  interpretation of results completed Yandel Jameson CCC-A CCVR. It was my pleasure to evaluate this patient.       YANDEL Jameson, CCC-A CCVR  Senior Clinical Vestibular Audiologist      Degree of Hearing Sensitivity Decibel Range   Within Normal Limits (WNL) 0-25   Mild 26-40   Moderate 41-55   Moderately-Severe 56-70   Severe 71-90   Profound 91+      Key   CNT/DNT Could Not Test/Did Not Test   TM Tympanic Membrane   WNL Within Normal Limits   HA Hearing Aid   SNHL Sensorineural Hearing Loss   CHL Conductive Hearing Loss   NIHL Noise-Induced Hearing Loss   ECV Ear Canal Volume   RE/LE Right Ear/Left Ear        AUDIOGRAM

## 2024-10-25 ENCOUNTER — CLINICAL SUPPORT (OUTPATIENT)
Dept: AUDIOLOGY | Facility: CLINIC | Age: 29
End: 2024-10-25
Payer: COMMERCIAL

## 2024-10-25 ENCOUNTER — LAB (OUTPATIENT)
Dept: LAB | Facility: LAB | Age: 29
End: 2024-10-25
Payer: COMMERCIAL

## 2024-10-25 DIAGNOSIS — I10 ESSENTIAL HYPERTENSION: ICD-10-CM

## 2024-10-25 DIAGNOSIS — Z00.00 ENCOUNTER FOR PREVENTATIVE ADULT HEALTH CARE EXAMINATION: ICD-10-CM

## 2024-10-25 DIAGNOSIS — H93.13 TINNITUS OF BOTH EARS: Primary | ICD-10-CM

## 2024-10-25 LAB
25(OH)D3 SERPL-MCNC: 42 NG/ML (ref 30–100)
ALBUMIN SERPL BCP-MCNC: 4.8 G/DL (ref 3.4–5)
ALP SERPL-CCNC: 54 U/L (ref 33–120)
ALT SERPL W P-5'-P-CCNC: 37 U/L (ref 10–52)
ANION GAP SERPL CALC-SCNC: 13 MMOL/L (ref 10–20)
AST SERPL W P-5'-P-CCNC: 24 U/L (ref 9–39)
BASOPHILS # BLD AUTO: 0.03 X10*3/UL (ref 0–0.1)
BASOPHILS NFR BLD AUTO: 0.6 %
BILIRUB SERPL-MCNC: 0.8 MG/DL (ref 0–1.2)
BUN SERPL-MCNC: 10 MG/DL (ref 6–23)
CALCIUM SERPL-MCNC: 9.1 MG/DL (ref 8.6–10.6)
CHLORIDE SERPL-SCNC: 104 MMOL/L (ref 98–107)
CHOLEST SERPL-MCNC: 145 MG/DL (ref 0–199)
CHOLESTEROL/HDL RATIO: 2.7
CO2 SERPL-SCNC: 27 MMOL/L (ref 21–32)
CREAT SERPL-MCNC: 0.9 MG/DL (ref 0.5–1.3)
CREAT UR-MCNC: 133.5 MG/DL (ref 20–370)
EGFRCR SERPLBLD CKD-EPI 2021: >90 ML/MIN/1.73M*2
EOSINOPHIL # BLD AUTO: 0.17 X10*3/UL (ref 0–0.7)
EOSINOPHIL NFR BLD AUTO: 3.7 %
ERYTHROCYTE [DISTWIDTH] IN BLOOD BY AUTOMATED COUNT: 12.3 % (ref 11.5–14.5)
EST. AVERAGE GLUCOSE BLD GHB EST-MCNC: 117 MG/DL
GLUCOSE SERPL-MCNC: 94 MG/DL (ref 74–99)
HBA1C MFR BLD: 5.7 %
HCT VFR BLD AUTO: 44.9 % (ref 41–52)
HDLC SERPL-MCNC: 54.2 MG/DL
HGB BLD-MCNC: 14.6 G/DL (ref 13.5–17.5)
IMM GRANULOCYTES # BLD AUTO: 0.01 X10*3/UL (ref 0–0.7)
IMM GRANULOCYTES NFR BLD AUTO: 0.2 % (ref 0–0.9)
LDLC SERPL CALC-MCNC: 75 MG/DL
LYMPHOCYTES # BLD AUTO: 1.96 X10*3/UL (ref 1.2–4.8)
LYMPHOCYTES NFR BLD AUTO: 42.2 %
MCH RBC QN AUTO: 28.7 PG (ref 26–34)
MCHC RBC AUTO-ENTMCNC: 32.5 G/DL (ref 32–36)
MCV RBC AUTO: 88 FL (ref 80–100)
MICROALBUMIN UR-MCNC: <7 MG/L
MICROALBUMIN/CREAT UR: NORMAL MG/G{CREAT}
MONOCYTES # BLD AUTO: 0.38 X10*3/UL (ref 0.1–1)
MONOCYTES NFR BLD AUTO: 8.2 %
NEUTROPHILS # BLD AUTO: 2.1 X10*3/UL (ref 1.2–7.7)
NEUTROPHILS NFR BLD AUTO: 45.1 %
NON HDL CHOLESTEROL: 91 MG/DL (ref 0–149)
NRBC BLD-RTO: 0 /100 WBCS (ref 0–0)
PLATELET # BLD AUTO: 309 X10*3/UL (ref 150–450)
POTASSIUM SERPL-SCNC: 4.7 MMOL/L (ref 3.5–5.3)
PROT SERPL-MCNC: 7.2 G/DL (ref 6.4–8.2)
RBC # BLD AUTO: 5.08 X10*6/UL (ref 4.5–5.9)
SODIUM SERPL-SCNC: 139 MMOL/L (ref 136–145)
TRIGL SERPL-MCNC: 79 MG/DL (ref 0–149)
TSH SERPL-ACNC: 0.99 MIU/L (ref 0.44–3.98)
VLDL: 16 MG/DL (ref 0–40)
WBC # BLD AUTO: 4.7 X10*3/UL (ref 4.4–11.3)

## 2024-10-25 PROCEDURE — 83036 HEMOGLOBIN GLYCOSYLATED A1C: CPT

## 2024-10-25 PROCEDURE — 80061 LIPID PANEL: CPT

## 2024-10-25 PROCEDURE — 82043 UR ALBUMIN QUANTITATIVE: CPT

## 2024-10-25 PROCEDURE — 80053 COMPREHEN METABOLIC PANEL: CPT

## 2024-10-25 PROCEDURE — 36415 COLL VENOUS BLD VENIPUNCTURE: CPT

## 2024-10-25 PROCEDURE — 84443 ASSAY THYROID STIM HORMONE: CPT

## 2024-10-25 PROCEDURE — 85025 COMPLETE CBC W/AUTO DIFF WBC: CPT

## 2024-10-25 PROCEDURE — 92557 COMPREHENSIVE HEARING TEST: CPT

## 2024-10-25 PROCEDURE — 82570 ASSAY OF URINE CREATININE: CPT

## 2024-10-25 PROCEDURE — 92550 TYMPANOMETRY & REFLEX THRESH: CPT

## 2024-10-25 PROCEDURE — 82306 VITAMIN D 25 HYDROXY: CPT

## 2024-11-06 ENCOUNTER — APPOINTMENT (OUTPATIENT)
Dept: PRIMARY CARE | Facility: CLINIC | Age: 29
End: 2024-11-06
Payer: COMMERCIAL

## 2024-11-06 VITALS
WEIGHT: 148.8 LBS | DIASTOLIC BLOOD PRESSURE: 73 MMHG | HEART RATE: 78 BPM | SYSTOLIC BLOOD PRESSURE: 115 MMHG | OXYGEN SATURATION: 98 % | BODY MASS INDEX: 21.35 KG/M2

## 2024-11-06 DIAGNOSIS — E78.2 MIXED HYPERLIPIDEMIA: ICD-10-CM

## 2024-11-06 DIAGNOSIS — R73.03 PREDIABETES: ICD-10-CM

## 2024-11-06 DIAGNOSIS — I10 ESSENTIAL HYPERTENSION: ICD-10-CM

## 2024-11-06 DIAGNOSIS — Z23 IMMUNIZATION DUE: ICD-10-CM

## 2024-11-06 DIAGNOSIS — Q87.81 ALPORT SYNDROME (HHS-HCC): ICD-10-CM

## 2024-11-06 DIAGNOSIS — M79.644 FINGER PAIN, RIGHT: ICD-10-CM

## 2024-11-06 DIAGNOSIS — Z00.00 ENCOUNTER FOR PREVENTATIVE ADULT HEALTH CARE EXAMINATION: Primary | ICD-10-CM

## 2024-11-06 DIAGNOSIS — H54.7 ALTERATION IN VISION: ICD-10-CM

## 2024-11-06 DIAGNOSIS — F41.1 GAD (GENERALIZED ANXIETY DISORDER): ICD-10-CM

## 2024-11-06 PROCEDURE — 1036F TOBACCO NON-USER: CPT | Performed by: INTERNAL MEDICINE

## 2024-11-06 PROCEDURE — 3078F DIAST BP <80 MM HG: CPT | Performed by: INTERNAL MEDICINE

## 2024-11-06 PROCEDURE — 90471 IMMUNIZATION ADMIN: CPT | Performed by: INTERNAL MEDICINE

## 2024-11-06 PROCEDURE — 99395 PREV VISIT EST AGE 18-39: CPT | Performed by: INTERNAL MEDICINE

## 2024-11-06 PROCEDURE — 90480 ADMN SARSCOV2 VAC 1/ONLY CMP: CPT | Performed by: INTERNAL MEDICINE

## 2024-11-06 PROCEDURE — 90656 IIV3 VACC NO PRSV 0.5 ML IM: CPT | Performed by: INTERNAL MEDICINE

## 2024-11-06 PROCEDURE — 3074F SYST BP LT 130 MM HG: CPT | Performed by: INTERNAL MEDICINE

## 2024-11-06 PROCEDURE — 91320 SARSCV2 VAC 30MCG TRS-SUC IM: CPT | Performed by: INTERNAL MEDICINE

## 2024-11-06 RX ORDER — LISDEXAMFETAMINE DIMESYLATE 10 MG/1
1 CAPSULE ORAL
COMMUNITY
Start: 2024-05-14 | End: 2024-11-06 | Stop reason: WASHOUT

## 2024-11-06 RX ORDER — CHLORHEXIDINE GLUCONATE 40 MG/ML
15 SOLUTION TOPICAL EVERY 12 HOURS
COMMUNITY
Start: 2023-08-10

## 2024-11-06 ASSESSMENT — PAIN SCALES - GENERAL: PAINLEVEL_OUTOF10: 10-WORST PAIN EVER

## 2024-11-06 NOTE — PATIENT INSTRUCTIONS
Patrick   Prediabetes - work on reduction in carbs and simple sugars   Referral to hand surgery - I recommend Dr. José Luis Doyle or Dr. Rafi Enriquez. Please call [phone] to schedule an appointment.  Followup 3 months, lab test prior

## 2024-11-06 NOTE — PROGRESS NOTES
Subjective   Patient ID: Patrick Mckeon is a 29 y.o. male who presents for Annual Exam.  HPI  29M here for followup visit, last seen   Stress test unrevealing recommended increased exercise, TTE unrevealing as well.     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 Seen by genetics followup every 3-5 years.  monitor for albuminuria or proteinuria (<0.2 ideally) annually or every 6 months if increased for progressive renal failure which is a potential risk. Has been followed by audiology found slight reduction in the left compared to prior recommended repeat again in 6 months for surveillance.    - Fibromuscular dysplasia - s/p revascularization in November including treatment for left renal artery aneurysm seen by Dr. Garcia of vascular surgery - on aspirin repeat duplex ultrasound in October shows good perfusion recommended followup in 6 months with Dr. Kelly with repeat ultrasound.   - Depression - on Lexapro improved, insomnia resolved.  Now with significant improvement in symptoms. Dose of 20mg is working for him.   - Renovascular HTN - followed by Dr. Hathaway on lisinopril 5mg   - ADHD - previously on Adderall now discontinued, attributes LFT dysfunction due to this, then vyvanse did not work. Discussed wellbutrin at last visit, currently not interested in management.  - Prediabetes - now recurred at 5.7% .   - HLD on rosuvastatin 10mg with h/o carotid artery plaque   - Migraine headaches - triggered by uncontrolled allergies. Now resolved.   - Allergies - takes zyrtec and benadryl daily for significant symptoms. a  - Insomnia - resolved.  - Pulsatile tinnitus - resolved. CT angio head and neck unremarkable      Social:   - Lives with wife Gretta and two cats, currently in process to moving to [place]   - Works at [employer], previously at [employer].   - Denies alcohol, tobacco or drug use.      Lifestyle - has noted progressive weight gain despite better diet.   - Diet - meal prep program with whole foods including legumes, nuts. Clean foods. Snacking on weekends, not a lot of sugary drinks.   - Exercise -   - Sleep - much better.     Current Outpatient Medications   Medication Instructions    aspirin 81 mg, oral, Daily    cetirizine (ZYRTEC) 10 mg, oral, Daily    chlorhexidine (Hibiclens) 4 % external liquid 15 mL, Every 12 hours    escitalopram (LEXAPRO) 20 mg, oral, Daily    lisinopril 5 mg, oral, Daily    rosuvastatin (CRESTOR) 10 mg, oral, Nightly    triamcinolone (Nasacort) 55 mcg nasal inhaler 2 sprays, Daily        Objective     /73   Pulse 78   Wt 67.5 kg (148 lb 12.8 oz)   SpO2 98%   BMI 21.35 kg/m²     Physical Exam  General: Awake, alert, appears stated age   Head/eyes/ears: NCAT, EOMI, PERRL, TM WNL, no cerumen  Throat: mucus membranes moist, pharynx unremarkable   Neck: Supple, nontender, no lymphadenopathy, thyroid exam unremarkable   Heart: RRR, no murmurs, rubs or gallops  Lungs: No wheezes, rhonchi or whales,  Abdomen: Soft, NT/ND  Extremities: No edema, 2+ DP pulses   Testicular exam: Testes smooth and firm without notable masses, penis without noted abnormalities, no inguinal adenopathy. Chaperone offered and declined.  Skin: No concerning skin lesions on visualized skin   Neuro: AAO x 3, no FND, gait unremarkable   Musk: Right 2nd digit with mild focal swelling and TTP at the PIP with difficulty with full flexion compared to the left     Assessment/Plan     Adult health Exam   Age-appropriate screening performed   No additional pertinent family history or toxic habits  No high risk sexual behavior, declines STI screen  Cancer screening:   -Discussed testicular self examinations  -Discussed skin cancer prevention strategies  Immunizations: Flu shot today, otherwise UTD   Encourage visual examination, dental UTD     Depth perception changes   Uncertain etiology, recommend  followup with optho     Digit pain/difficulty with flexion   Refer to hand clinic, autoimmune workup in the past unrevealing.    Alport Syndrome   Monitoring for progression to renal disease with yearly urine albumin and monitoring of renal function   Encouraged followup with optho   Brit with genetics every 3-6 years   Monitor hearing changes with audiology q6m     Fibromuscular dysplasia   s/p revascularization for left renal artery aneurysm seen by Dr. Garcia of vascular surgery - on aspirin   Repeat duplex ultrasound in October shows good perfusion recommended followup in 6 months with repeat ultrasound.     Depression  On Lexapro 20  improved, insomnia resolved.  Now with significant improvement in symptoms.     Renovascular HTN  Followed by Dr. Hathaway on lisinopril 5mg working well    Prediabetes  History of prediabetes now recurrence unclear why   Encouraged healthy lifestyle, incorporation of exercise regimen.   Will recheck in 3 months' time      HLD on rosuvastatin 10mg with h/o carotid artery plaque      Followup 3 months   Assessment/Plan   Problem List Items Addressed This Visit       Alport syndrome (HHS-HCC)     Other Visit Diagnoses       Encounter for preventative adult health care examination    -  Primary    Finger pain, right        Relevant Orders    Referral to Orthopaedic Surgery    Immunization due        Relevant Orders    Flu vaccine, trivalent, preservative free, age 6 months and greater (Fluraix/Fluzone/Flulaval) (Completed)    Pfizer COVID-19 vaccine, monovalent, age 12 years and older (30 mcg/0.3 mL) (Comirnaty) (Completed)    Prediabetes        Relevant Orders    Hemoglobin A1C    Alteration in vision

## 2024-12-03 ENCOUNTER — OFFICE VISIT (OUTPATIENT)
Dept: PRIMARY CARE | Facility: CLINIC | Age: 29
End: 2024-12-03
Payer: COMMERCIAL

## 2024-12-03 DIAGNOSIS — M25.561 ACUTE PAIN OF RIGHT KNEE: ICD-10-CM

## 2024-12-03 DIAGNOSIS — J30.9 ALLERGIC RHINITIS, UNSPECIFIED SEASONALITY, UNSPECIFIED TRIGGER: ICD-10-CM

## 2024-12-03 DIAGNOSIS — F33.1 MODERATE EPISODE OF RECURRENT MAJOR DEPRESSIVE DISORDER: Primary | ICD-10-CM

## 2024-12-03 PROCEDURE — 1036F TOBACCO NON-USER: CPT | Performed by: INTERNAL MEDICINE

## 2024-12-03 PROCEDURE — 99214 OFFICE O/P EST MOD 30 MIN: CPT | Performed by: INTERNAL MEDICINE

## 2024-12-03 RX ORDER — BUPROPION HYDROCHLORIDE 150 MG/1
150 TABLET ORAL EVERY MORNING
Qty: 30 TABLET | Refills: 0 | Status: SHIPPED | OUTPATIENT
Start: 2024-12-03 | End: 2025-01-02

## 2024-12-03 NOTE — PATIENT INSTRUCTIONS
Patrick,   Depression - add wellbutrin to Lexapro. Take half a tablet by mouth in the morning. If you feel (and/or does not work), increase to full tablet.  Let me know how you are feeling within 2-4 weeks.   Right knee   Stop by the first floor for your x-ray or call [phone] to have this scheduled.  Referral to physical therapy   If no response, I recommend seeing physical medicine and rehab specialist   Sports Medicine referral - I recommend either  Dr. Babita Zhao  ([phone]) or Dr. Jarvis Khan ([phone])Please call to have this scheduled.

## 2024-12-03 NOTE — ASSESSMENT & PLAN NOTE
With significant anhedonia despite improvement in depression symptoms on Lexapro 20mg.   Will augment with wellbutrin which may have added benefit of helping with attention.   Potential side effects management reviewed including insomnia, elevation in BP and heartrate.   There is no underlying seizure or eating disorder.  Monitor blood pressure and heart rate.  Start half tablet and uptitrate as tolerated.     Orders:    buPROPion XL (Wellbutrin XL) 150 mg 24 hr tablet; Take 1 tablet (150 mg) by mouth once daily in the morning. Do not crush, chew, or split.

## 2024-12-03 NOTE — PROGRESS NOTES
"Subjective   Patient ID: Patrick Mckeon is a 29 y.o. male who presents for No chief complaint on file..  HPI  29-year-old male here for follow-up visit, last seen last month for preventive care visit.  Interested in consideration for Wellbutrin to assist with concentration and focus.    - Depression - on Lexapro 20mg improved, insomnia resolved but has noted persistent anhedonia. Describes symptoms as no desire to leave bed or do things, but still significantly improved. No history of seizures or eating disorder.   - Right knee - notes a anterior pain after walking for prolonged period of time, has to \"pop\" it to improve symptoms but has noted progressively worsening of symptoms even when not provoked. When moving his leg a certain way the knee gets activated. Pain is severe at times, lasts around 15 seconds before resolving spontaneously. Denies swelling, no known inciting event injury or trauma. No change in symptoms with stairs.     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 Seen by genetics followup every 3-5 years.  monitor for albuminuria or proteinuria (<0.2 ideally) annually or every 6 months if increased for progressive renal failure which is a potential risk.  Audiology surveillance every 6 months  - Fibromuscular dysplasia - s/p revascularization in November including treatment for left renal artery aneurysm seen by Dr. Garcia of vascular surgery - on aspirin repeat duplex ultrasound in October shows good perfusion recommended followup in 6 months with Dr. Kelly with repeat ultrasound.   - Renovascular HTN - followed by Dr. Hathaway on lisinopril 5mg   - ADHD - previously on Adderall now discontinued, attributes LFT dysfunction due to this, then vyvanse did not work. Discussed wellbutrin at last visit, currently not interested in management.  - Prediabetes - now recurred at 5.7% .   - " HLD on rosuvastatin 10mg with h/o carotid artery plaque   - Migraine headaches - triggered by uncontrolled allergies. Now resolved.   - Allergies - takes zyrtec and benadryl daily for significant symptoms. a  - Insomnia - resolved.  - Pulsatile tinnitus - resolved. CT angio head and neck unremarkable   -Depth perception changes-recommended follow-up with ophthalmology  - Digit pain -advised follow-up with hand clinicdifficulty with flexion -   - Prediabetes -recommended lifestyle modifications     Social:   - Lives with wife Gretta and two cats, currently in process to moving to [place]   - Works at [employer], previously at [employer].   - Denies alcohol, tobacco or drug use.   Current Outpatient Medications   Medication Instructions    aspirin 81 mg, oral, Daily    cetirizine (ZYRTEC) 10 mg, oral, Daily    chlorhexidine (Hibiclens) 4 % external liquid 15 mL, Every 12 hours    escitalopram (LEXAPRO) 20 mg, oral, Daily    lisinopril 5 mg, oral, Daily    rosuvastatin (CRESTOR) 10 mg, oral, Nightly    triamcinolone (Nasacort) 55 mcg nasal inhaler 2 sprays, Daily        Objective     There were no vitals taken for this visit.    Physical Exam  General: Alert and oriented, in no apparent distress   HEENT: No conjunctival erythema, no external facial lesions   Lungs: Breathing comfortably  Skin: No evidence of skin breakdown.  Neuro: AAO x 3, answering questions appropriately, no obvious cranial nerve deficits   Gait, no effusions or erythema appreciated. No asymmetric crepitus.   Right knee ROM: No external abnomalities, no swelling. Full extension, full flexion, no joint line tenderness in deep flexion Full ROM at hip, mild medial joint line tenderness, January's test negative, negative varus and valgus stress testing, negative anterior drawer testing.        Assessment/Plan     Assessment & Plan  Moderate episode of recurrent major depressive disorder  With significant anhedonia despite improvement in depression symptoms  on Lexapro 20mg.   Will augment with wellbutrin which may have added benefit of helping with attention.   Potential side effects management reviewed including insomnia, elevation in BP and heartrate.   There is no underlying seizure or eating disorder.  Monitor blood pressure and heart rate.  Start half tablet and uptitrate as tolerated.     Orders:    buPROPion XL (Wellbutrin XL) 150 mg 24 hr tablet; Take 1 tablet (150 mg) by mouth once daily in the morning. Do not crush, chew, or split.    Allergic rhinitis, unspecified seasonality, unspecified trigger    Orders:    cetirizine (ZyrTEC) 10 mg capsule; Take 1 capsule (10 mg) by mouth once daily.    Acute pain of right knee  Chronic recurring issue. Suspect PFS, but given acuity check XR   Refer to PT, if no response then sports medicine   Orders:    Referral to Physical Therapy; Future    Referral to Physical Medicine Rehab; Future    XR knee right 3 views; Future

## 2024-12-10 ENCOUNTER — OFFICE VISIT (OUTPATIENT)
Dept: ORTHOPEDIC SURGERY | Facility: CLINIC | Age: 29
End: 2024-12-10
Payer: COMMERCIAL

## 2024-12-10 DIAGNOSIS — M79.644 FINGER PAIN, RIGHT: Primary | ICD-10-CM

## 2024-12-10 PROCEDURE — 99214 OFFICE O/P EST MOD 30 MIN: CPT | Mod: GC | Performed by: ORTHOPAEDIC SURGERY

## 2024-12-10 PROCEDURE — 99204 OFFICE O/P NEW MOD 45 MIN: CPT | Performed by: ORTHOPAEDIC SURGERY

## 2024-12-10 PROCEDURE — 1036F TOBACCO NON-USER: CPT | Performed by: ORTHOPAEDIC SURGERY

## 2024-12-10 NOTE — LETTER
January 23, 2025     Ekaterina Flowers DO  [id number] [facility] [id number]    Patient: Patrick Mckeon   YOB: 1995   Date of Visit: 12/10/2024       Dear Dr. Ekaterina Flowers DO:    Thank you for referring Patrick Mckeon to me for evaluation. Below are my notes for this consultation.  If you have questions, please do not hesitate to call me. I look forward to following your patient along with you.       Sincerely,     José Luis Doyle MD      CC: No Recipients  ______________________________________________________________________________________    CHIEF COMPLAINT         Right index finger pain    ASSESSMENT + PLAN    Right index finger pain of unclear etiology    I reviewed that the x-rays are normal.  The ligaments and tendons are functioning normally on exam.  We discussed that there is no clear orthopedic diagnosis that I can give him at this time.  I do not see any structural issues with his finger.  He may benefit from a rheumatology consultation.  He does have some generalized inflammation of the finger which could be from an underlying autoimmune or systemic inflammatory cause.    Patient to follow-up with me as needed.        HISTORY OF PRESENT ILLNESS       Patient is a 29 y.o. right-hand dominant male , who presents today for evaluation of right index finger pain.  He feels pain along the dorsal aspect of the proximal phalanx whenever he is flexing his finger.  It has been present for about a year.  He denies any trauma to the area.  He notes associated warmth and redness with the pain.  There is no particular relation to position or time of day.  No numbness or tingling.  No similar issues in other digits.  No popping, clicking, or subjective instability.    Of note, he has a past medical history significant for fibromuscular dysplasia and had a renal artery repair done for an aneurysm.  He is not diabetic or hypothyroid.  He does not smoke.      REVIEW OF  SYSTEMS       A 30-item multi-system Review Of Systems was obtained on today's intake form.  This was reviewed with the patient and is correct.  The pertinent positives and negatives are listed above.  The form has been scanned separately into the medical record.      PHYSICAL EXAM    Constitutional:    Appears stated age. Well-developed and well-nourished male in no acute distress.  Psychiatric:         Pleasant normal mood and affect. Behavior is appropriate for the situation.   Head:                   Normocephalic and atraumatic.  Eyes:                    Pupils are equal and round.  Cardiovascular:  2+ radial and ulnar pulses. Fingers well-perfused.  Respiratory:        Effort normal. No respiratory distress. Speaking in complete sentences.  Neurologic:       Alert and oriented to person, place, and time.  Skin:                Skin is intact, warm and dry.  Hematologic / Lymphatic:    No lymphedema or lymphangitis.    Extremities / Musculoskeletal:                      Right Hand:  - Skin: intact  - Swelling at index finger MP and PIP joints  -Pain with flexion of index finger  - SILT M/U/R  - RoM: full pronation/supination  - Fires AIN/PIN/Ulnar distributions  - Fingertips pink/warm, cap refill < 2sec  - 2+ radial pulse  Skin of the right index finger and hand is intact with no erythema, ecchymosis, or diffuse swelling.  Normal skin drag and coloration.  Full composite finger flexion extension with full intrinsic plus minus posture.  Good sagittal plane balance.  Tendons are intact individual testing.  No triggering.  Stable collaterals at each joint.  Sensation intact to light touch in all distributions.  Capillary refill less than 2 seconds.  Symmetric wrist and forearm motion.      IMAGING / LABS / EMGs           3 views of the right index finger obtained 4/5/2024 were independently interpreted by me today.  These demonstrate no fracture or dislocation.  There is mild soft tissue swelling around the level  of the PIP joint.  Joint spaces are concentric and well-preserved.      Past Medical History:   Diagnosis Date   • Anxiety    • Depression    • GERD (gastroesophageal reflux disease)    • Hyperlipidemia    • Hypertension    • Renal artery stenosis (CMS-HCC)     Nep: MAN Moreno 8/2023   • Renal disease due to hypertension        Medication Documentation Review Audit       Reviewed by Ekaterina Flowers DO (Physician) on 12/03/24 at 1314      Medication Order Taking? Sig Documenting Provider Last Dose Status   aspirin 81 mg EC tablet [id number] No Take 1 tablet (81 mg) by mouth once daily. Historical Provider, MD Taking Active   cetirizine (ZyrTEC) 10 mg capsule [id number] No Take 1 capsule (10 mg) by mouth once daily. Historical Provider, MD Taking Active   chlorhexidine (Hibiclens) 4 % external liquid [id number]  3 Applications every 12 hours. Historical Provider, MD  Active   escitalopram (Lexapro) 20 mg tablet [id number] No TAKE 1 TABLET BY MOUTH EVERY DAY Ekaterina Flowers DO Taking Active   lisinopril 5 mg tablet [id number] No Take 1 tablet (5 mg) by mouth once daily. Vivi Brown MD Taking Active   rosuvastatin (Crestor) 10 mg tablet [id number] No TAKE 1 TABLET (10 MG) BY MOUTH ONCE DAILY AT BEDTIME. Ekaterina Flowers DO Taking Active   triamcinolone (Nasacort) 55 mcg nasal inhaler [id number] No Administer 2 sprays into each nostril once daily. Historical Provider, MD Taking Active                    Allergies   Allergen Reactions   • Amlodipine Swelling     swelling in lower extremities       Social History     Socioeconomic History   • Marital status:      Spouse name: Not on file   • Number of children: Not on file   • Years of education: Not on file   • Highest education level: Not on file   Occupational History   • Not on file   Tobacco Use   • Smoking status: Never   • Smokeless tobacco: Never   Vaping Use   • Vaping status: Never Used   Substance and Sexual Activity   • Alcohol use: Not Currently      Comment: rare   • Drug use: Never   • Sexual activity: Defer   Other Topics Concern   • Not on file   Social History Narrative   • Not on file     Social Drivers of Health     Financial Resource Strain: Low Risk  (12/1/2023)    Overall Financial Resource Strain (CARDIA)    • Difficulty of Paying Living Expenses: Not hard at all   Food Insecurity: Not on file   Transportation Needs: No Transportation Needs (12/1/2023)    PRAPARE - Transportation    • Lack of Transportation (Medical): No    • Lack of Transportation (Non-Medical): No   Physical Activity: Not on file   Stress: Not on file   Social Connections: Not on file   Intimate Partner Violence: Not on file   Housing Stability: Low Risk  (12/1/2023)    Housing Stability Vital Sign    • Unable to Pay for Housing in the Last Year: No    • Number of Places Lived in the Last Year: 1    • Unstable Housing in the Last Year: No       Past Surgical History:   Procedure Laterality Date   • CT ABDOMEN PELVIS ANGIOGRAM W AND/OR WO IV CONTRAST  07/03/2023    CT ABDOMEN PELVIS ANGIOGRAM W AND/OR WO IV CONTRAST 7/3/2023 [facility]   • CT ANGIO NECK  07/25/2023    CT NECK ANGIO W AND WO IV CONTRAST 7/25/2023 [facility] CT   • CT HEAD ANGIO W AND WO IV CONTRAST  07/25/2023    CT HEAD ANGIO W AND WO IV CONTRAST 7/25/2023 U CT   • INVASIVE VASCULAR PROCEDURE Bilateral 10/12/2023    Procedure: Upper Extremity Angiogram;  Surgeon: Jovany Kelly MD;  Location: [facility] Cardiac Cath Lab;  Service: Vascular Surgery;  Laterality: Bilateral;   • IR ANGIOGRAM RENAL BILATERAL Bilateral 08/15/2023    IR ANGIOGRAM RENAL BILATERAL 8/15/2023 [facility]   • OTHER SURGICAL HISTORY  11/22/2022    Lipoma excision         Electronically Signed      Nicholas Carvalho MD     ATTESTATION    I saw and evaluated the patient. I personally obtained the key and critical portions of the history and physical exam or was physically present for key and critical portions performed by the resident/fellow. I reviewed the  resident/fellow's documentation and discussed the patient with the resident/fellow. I agree with the resident/fellow's medical decision making as documented in the note.        Electronically signed  KIMBERLY Doyle MD  [phone]

## 2024-12-10 NOTE — PROGRESS NOTES
CHIEF COMPLAINT         Right index finger pain    ASSESSMENT + PLAN    Right index finger pain of unclear etiology    I reviewed that the x-rays are normal.  The ligaments and tendons are functioning normally on exam.  We discussed that there is no clear orthopedic diagnosis that I can give him at this time.  I do not see any structural issues with his finger.  He may benefit from a rheumatology consultation.  He does have some generalized inflammation of the finger which could be from an underlying autoimmune or systemic inflammatory cause.    Patient to follow-up with me as needed.        HISTORY OF PRESENT ILLNESS       Patient is a 29 y.o. right-hand dominant male , who presents today for evaluation of right index finger pain.  He feels pain along the dorsal aspect of the proximal phalanx whenever he is flexing his finger.  It has been present for about a year.  He denies any trauma to the area.  He notes associated warmth and redness with the pain.  There is no particular relation to position or time of day.  No numbness or tingling.  No similar issues in other digits.  No popping, clicking, or subjective instability.    Of note, he has a past medical history significant for fibromuscular dysplasia and had a renal artery repair done for an aneurysm.  He is not diabetic or hypothyroid.  He does not smoke.      REVIEW OF SYSTEMS       A 30-item multi-system Review Of Systems was obtained on today's intake form.  This was reviewed with the patient and is correct.  The pertinent positives and negatives are listed above.  The form has been scanned separately into the medical record.      PHYSICAL EXAM    Constitutional:    Appears stated age. Well-developed and well-nourished male in no acute distress.  Psychiatric:         Pleasant normal mood and affect. Behavior is appropriate for the situation.   Head:                   Normocephalic and atraumatic.  Eyes:                    Pupils are equal and  round.  Cardiovascular:  2+ radial and ulnar pulses. Fingers well-perfused.  Respiratory:        Effort normal. No respiratory distress. Speaking in complete sentences.  Neurologic:       Alert and oriented to person, place, and time.  Skin:                Skin is intact, warm and dry.  Hematologic / Lymphatic:    No lymphedema or lymphangitis.    Extremities / Musculoskeletal:                      Right Hand:  - Skin: intact  - Swelling at index finger MP and PIP joints  -Pain with flexion of index finger  - SILT M/U/R  - RoM: full pronation/supination  - Fires AIN/PIN/Ulnar distributions  - Fingertips pink/warm, cap refill < 2sec  - 2+ radial pulse  Skin of the right index finger and hand is intact with no erythema, ecchymosis, or diffuse swelling.  Normal skin drag and coloration.  Full composite finger flexion extension with full intrinsic plus minus posture.  Good sagittal plane balance.  Tendons are intact individual testing.  No triggering.  Stable collaterals at each joint.  Sensation intact to light touch in all distributions.  Capillary refill less than 2 seconds.  Symmetric wrist and forearm motion.      IMAGING / LABS / EMGs           3 views of the right index finger obtained 4/5/2024 were independently interpreted by me today.  These demonstrate no fracture or dislocation.  There is mild soft tissue swelling around the level of the PIP joint.  Joint spaces are concentric and well-preserved.      Past Medical History:   Diagnosis Date    Anxiety     Depression     GERD (gastroesophageal reflux disease)     Hyperlipidemia     Hypertension     Renal artery stenosis (CMS-HCC)     Nep: MAN Moreno 8/2023    Renal disease due to hypertension        Medication Documentation Review Audit       Reviewed by Ekaterina Flowers DO (Physician) on 12/03/24 at 1314      Medication Order Taking? Sig Documenting Provider Last Dose Status   aspirin 81 mg EC tablet [id number] No Take 1 tablet (81 mg) by mouth once  daily. Historical Provider, MD Taking Active   cetirizine (ZyrTEC) 10 mg capsule [id number] No Take 1 capsule (10 mg) by mouth once daily. Historical Provider, MD Taking Active   chlorhexidine (Hibiclens) 4 % external liquid [id number]  3 Applications every 12 hours. Historical Provider, MD  Active   escitalopram (Lexapro) 20 mg tablet [id number] No TAKE 1 TABLET BY MOUTH EVERY DAY Ekaterina Flowers,  Taking Active   lisinopril 5 mg tablet [id number] No Take 1 tablet (5 mg) by mouth once daily. Vivi Brown MD Taking Active   rosuvastatin (Crestor) 10 mg tablet [id number] No TAKE 1 TABLET (10 MG) BY MOUTH ONCE DAILY AT BEDTIME. Ekaterina Flowers DO Taking Active   triamcinolone (Nasacort) 55 mcg nasal inhaler [id number] No Administer 2 sprays into each nostril once daily. Historical Provider, MD Taking Active                    Allergies   Allergen Reactions    Amlodipine Swelling     swelling in lower extremities       Social History     Socioeconomic History    Marital status:      Spouse name: Not on file    Number of children: Not on file    Years of education: Not on file    Highest education level: Not on file   Occupational History    Not on file   Tobacco Use    Smoking status: Never    Smokeless tobacco: Never   Vaping Use    Vaping status: Never Used   Substance and Sexual Activity    Alcohol use: Not Currently     Comment: rare    Drug use: Never    Sexual activity: Defer   Other Topics Concern    Not on file   Social History Narrative    Not on file     Social Drivers of Health     Financial Resource Strain: Low Risk  (12/1/2023)    Overall Financial Resource Strain (CARDIA)     Difficulty of Paying Living Expenses: Not hard at all   Food Insecurity: Not on file   Transportation Needs: No Transportation Needs (12/1/2023)    PRAPARE - Transportation     Lack of Transportation (Medical): No     Lack of Transportation (Non-Medical): No   Physical Activity: Not on file   Stress: Not on file   Social  Connections: Not on file   Intimate Partner Violence: Not on file   Housing Stability: Low Risk  (12/1/2023)    Housing Stability Vital Sign     Unable to Pay for Housing in the Last Year: No     Number of Places Lived in the Last Year: 1     Unstable Housing in the Last Year: No       Past Surgical History:   Procedure Laterality Date    CT ABDOMEN PELVIS ANGIOGRAM W AND/OR WO IV CONTRAST  07/03/2023    CT ABDOMEN PELVIS ANGIOGRAM W AND/OR WO IV CONTRAST 7/3/2023 [facility]    CT ANGIO NECK  07/25/2023    CT NECK ANGIO W AND WO IV CONTRAST 7/25/2023 [facility] CT    CT HEAD ANGIO W AND WO IV CONTRAST  07/25/2023    CT HEAD ANGIO W AND WO IV CONTRAST 7/25/2023 [facility] CT    INVASIVE VASCULAR PROCEDURE Bilateral 10/12/2023    Procedure: Upper Extremity Angiogram;  Surgeon: Jovany Kelly MD;  Location: [facility] Cardiac Cath Lab;  Service: Vascular Surgery;  Laterality: Bilateral;    IR ANGIOGRAM RENAL BILATERAL Bilateral 08/15/2023    IR ANGIOGRAM RENAL BILATERAL 8/15/2023 [facility]    OTHER SURGICAL HISTORY  11/22/2022    Lipoma excision         Electronically Signed      Nicholas Carvalho MD     ATTESTATION    I saw and evaluated the patient. I personally obtained the key and critical portions of the history and physical exam or was physically present for key and critical portions performed by the resident/fellow. I reviewed the resident/fellow's documentation and discussed the patient with the resident/fellow. I agree with the resident/fellow's medical decision making as documented in the note.        Electronically signed  KIMBERLY Doyle MD  [phone]

## 2024-12-25 DIAGNOSIS — F33.1 MODERATE EPISODE OF RECURRENT MAJOR DEPRESSIVE DISORDER: ICD-10-CM

## 2024-12-30 DIAGNOSIS — F33.1 MODERATE EPISODE OF RECURRENT MAJOR DEPRESSIVE DISORDER: ICD-10-CM

## 2024-12-30 RX ORDER — BUPROPION HYDROCHLORIDE 150 MG/1
150 TABLET ORAL EVERY MORNING
Qty: 30 TABLET | Refills: 0 | Status: SHIPPED | OUTPATIENT
Start: 2024-12-30 | End: 2025-01-29

## 2024-12-30 RX ORDER — ESCITALOPRAM OXALATE 20 MG/1
20 TABLET ORAL DAILY
Qty: 90 TABLET | Refills: 1 | Status: SHIPPED | OUTPATIENT
Start: 2024-12-30

## 2025-01-06 ENCOUNTER — PATIENT MESSAGE (OUTPATIENT)
Dept: UROLOGY | Facility: HOSPITAL | Age: 30
End: 2025-01-06
Payer: COMMERCIAL

## 2025-01-07 ENCOUNTER — TELEPHONE (OUTPATIENT)
Dept: UROLOGY | Facility: HOSPITAL | Age: 30
End: 2025-01-07
Payer: COMMERCIAL

## 2025-01-07 NOTE — TELEPHONE ENCOUNTER
Pt wants to schedule vasectomy, states he has a consult with another provider, please advise on how to proceed, does he need consult with Vicky?

## 2025-01-23 ENCOUNTER — APPOINTMENT (OUTPATIENT)
Dept: OPHTHALMOLOGY | Facility: CLINIC | Age: 30
End: 2025-01-23
Payer: COMMERCIAL

## 2025-01-23 DIAGNOSIS — F33.1 MODERATE EPISODE OF RECURRENT MAJOR DEPRESSIVE DISORDER: ICD-10-CM

## 2025-01-24 RX ORDER — BUPROPION HYDROCHLORIDE 150 MG/1
150 TABLET ORAL EVERY MORNING
Qty: 30 TABLET | Refills: 0 | OUTPATIENT
Start: 2025-01-24 | End: 2025-02-23

## 2025-01-26 DIAGNOSIS — E78.5 HYPERLIPIDEMIA, UNSPECIFIED HYPERLIPIDEMIA TYPE: ICD-10-CM

## 2025-01-27 DIAGNOSIS — I70.1 RENAL ARTERY STENOSIS (CMS-HCC): ICD-10-CM

## 2025-01-27 DIAGNOSIS — I10 ESSENTIAL HYPERTENSION: ICD-10-CM

## 2025-01-27 RX ORDER — ROSUVASTATIN CALCIUM 10 MG/1
10 TABLET, COATED ORAL NIGHTLY
Qty: 90 TABLET | Refills: 1 | Status: SHIPPED | OUTPATIENT
Start: 2025-01-27

## 2025-01-27 RX ORDER — LISINOPRIL 5 MG/1
5 TABLET ORAL DAILY
Qty: 90 TABLET | Refills: 3 | Status: SHIPPED | OUTPATIENT
Start: 2025-01-27 | End: 2026-01-27

## 2025-02-01 LAB
EST. AVERAGE GLUCOSE BLD GHB EST-MCNC: 123 MG/DL
EST. AVERAGE GLUCOSE BLD GHB EST-SCNC: 6.8 MMOL/L
HBA1C MFR BLD: 5.9 % OF TOTAL HGB

## 2025-02-03 ENCOUNTER — OFFICE VISIT (OUTPATIENT)
Dept: CARDIOLOGY | Facility: HOSPITAL | Age: 30
End: 2025-02-03
Payer: COMMERCIAL

## 2025-02-03 VITALS
HEART RATE: 67 BPM | BODY MASS INDEX: 21.76 KG/M2 | HEIGHT: 71 IN | OXYGEN SATURATION: 97 % | DIASTOLIC BLOOD PRESSURE: 88 MMHG | WEIGHT: 155.4 LBS | RESPIRATION RATE: 18 BRPM | SYSTOLIC BLOOD PRESSURE: 142 MMHG

## 2025-02-03 DIAGNOSIS — I15.0 RENOVASCULAR HYPERTENSION: Primary | ICD-10-CM

## 2025-02-03 DIAGNOSIS — I72.2 ANEURYSM OF RENAL ARTERY (CMS-HCC): ICD-10-CM

## 2025-02-03 DIAGNOSIS — R00.2 PALPITATIONS: ICD-10-CM

## 2025-02-03 PROCEDURE — 3078F DIAST BP <80 MM HG: CPT | Performed by: INTERNAL MEDICINE

## 2025-02-03 PROCEDURE — 1036F TOBACCO NON-USER: CPT | Performed by: INTERNAL MEDICINE

## 2025-02-03 PROCEDURE — 99214 OFFICE O/P EST MOD 30 MIN: CPT | Mod: 25 | Performed by: INTERNAL MEDICINE

## 2025-02-03 PROCEDURE — 93010 ELECTROCARDIOGRAM REPORT: CPT | Performed by: INTERNAL MEDICINE

## 2025-02-03 PROCEDURE — 99214 OFFICE O/P EST MOD 30 MIN: CPT | Performed by: INTERNAL MEDICINE

## 2025-02-03 PROCEDURE — 3077F SYST BP >= 140 MM HG: CPT | Performed by: INTERNAL MEDICINE

## 2025-02-03 PROCEDURE — 93005 ELECTROCARDIOGRAM TRACING: CPT | Performed by: INTERNAL MEDICINE

## 2025-02-03 PROCEDURE — 3008F BODY MASS INDEX DOCD: CPT | Performed by: INTERNAL MEDICINE

## 2025-02-03 NOTE — PROGRESS NOTES
02/03/25  1:51 PM    Vascular Medicine Follow-up    MR. Mckeon is a terrific 29 y.o. man seen in follow-up for renovascular HTN.  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     I have been managing his medication.  He is on on lisinopril 5 mg/day.  Since I saw him, BP was actually great, 110s-70s but he was put on Wellbutrin for anxiety + ADHD and BP became uncontrolled, he felt poorly, and HR went up.  He has been off this for 3 weeks; overall feeling better, But Bps still 130s/80s. He is taking aspirin.  He has some exertional dyspnea.      Past Medical History:   Diagnosis Date    Anxiety     Depression     GERD (gastroesophageal reflux disease)     Hyperlipidemia     Hypertension     Renal artery stenosis (CMS-HCC)     Nep: Fabian Stokes, LOV 8/2023    Renal disease due to hypertension      Past Surgical History:   Procedure Laterality Date    CT ABDOMEN PELVIS ANGIOGRAM W AND/OR WO IV CONTRAST  07/03/2023    CT ABDOMEN PELVIS ANGIOGRAM W AND/OR WO IV CONTRAST 7/3/2023 [facility] CT    CT ANGIO NECK  07/25/2023    CT NECK ANGIO W AND WO IV CONTRAST 7/25/2023 [facility] CT    CT HEAD ANGIO W AND WO IV CONTRAST  07/25/2023    CT HEAD ANGIO W AND WO IV CONTRAST 7/25/2023 [facility] CT    INVASIVE VASCULAR PROCEDURE Bilateral 10/12/2023    Procedure: Upper Extremity Angiogram;  Surgeon: Jovany Kelly MD;  Location: [facility] Cardiac Cath Lab;  Service: Vascular Surgery;  Laterality: Bilateral;    IR ANGIOGRAM RENAL BILATERAL Bilateral 08/15/2023    IR ANGIOGRAM RENAL BILATERAL 8/15/2023 [facility]    OTHER SURGICAL HISTORY  11/22/2022    Lipoma excision     Current Outpatient Medications   Medication Sig Dispense Refill    aspirin 81 mg EC tablet Take 1 tablet (81 mg) by mouth once daily.      cetirizine (ZyrTEC) 10 mg capsule Take 1 capsule (10  "mg) by mouth once daily. 90 capsule 3    escitalopram (Lexapro) 20 mg tablet Take 1 tablet (20 mg) by mouth once daily. 90 tablet 1    lisinopril 5 mg tablet Take 1 tablet (5 mg) by mouth once daily. 90 tablet 3    rosuvastatin (Crestor) 10 mg tablet TAKE 1 TABLET (10 MG) BY MOUTH ONCE DAILY AT BEDTIME. 90 tablet 1    triamcinolone (Nasacort) 55 mcg nasal inhaler Administer 2 sprays into each nostril once daily.      chlorhexidine (Hibiclens) 4 % external liquid 3 Applications every 12 hours.       No current facility-administered medications for this visit.     On examination:  Patient Vitals for the past 24 hrs:   BP Pulse Resp SpO2 Height Weight   02/03/25 1320 142/88 -- -- -- -- --   02/03/25 1319 159/74 67 18 97 % 1.791 m (5' 10.5\") 70.5 kg (155 lb 6.4 oz)   He is in no distress  HEENT benign  +S1, S2, RRR; no m/r/g  Breathing non labored  No edema  He was alert, oriented, fluid speech  Appropriate affect    Data reviewed    ECG today NSR at 67 beats per minute  Normal intervals/axis  Non specific T wave abnormality c/w prior    Recent labs  Component      Latest Ref Rng 10/25/2024   WBC      4.4 - 11.3 x10*3/uL 4.7    nRBC      0.0 - 0.0 /100 WBCs 0.0    RBC      4.50 - 5.90 x10*6/uL 5.08    HEMOGLOBIN      13.5 - 17.5 g/dL 14.6    HEMATOCRIT      41.0 - 52.0 % 44.9    MCV      80 - 100 fL 88    MCH      26.0 - 34.0 pg 28.7    MCHC      32.0 - 36.0 g/dL 32.5    RED CELL DISTRIBUTION WIDTH      11.5 - 14.5 % 12.3    Platelets      150 - 450 x10*3/uL 309    Neutrophils %      40.0 - 80.0 % 45.1    Immature Granulocytes %, Automated      0.0 - 0.9 % 0.2    Lymphocytes %      13.0 - 44.0 % 42.2    Monocytes %      2.0 - 10.0 % 8.2    Eosinophils %      0.0 - 6.0 % 3.7    Basophils %      0.0 - 2.0 % 0.6    Neutrophils Absolute      1.20 - 7.70 x10*3/uL 2.10    Immature Granulocytes Absolute, Automated      0.00 - 0.70 x10*3/uL 0.01    Lymphocytes Absolute      1.20 - 4.80 x10*3/uL 1.96    Monocytes Absolute      " 0.10 - 1.00 x10*3/uL 0.38    Eosinophils Absolute      0.00 - 0.70 x10*3/uL 0.17    Basophils Absolute      0.00 - 0.10 x10*3/uL 0.03    GLUCOSE      74 - 99 mg/dL 94    SODIUM      136 - 145 mmol/L 139    POTASSIUM      3.5 - 5.3 mmol/L 4.7    CHLORIDE      98 - 107 mmol/L 104    Bicarbonate      21 - 32 mmol/L 27    Anion Gap      10 - 20 mmol/L 13    Blood Urea Nitrogen      6 - 23 mg/dL 10    Creatinine      0.50 - 1.30 mg/dL 0.90    EGFR      >60 mL/min/1.73m*2 >90    Calcium      8.6 - 10.6 mg/dL 9.1    Albumin      3.4 - 5.0 g/dL 4.8    Alkaline Phosphatase      33 - 120 U/L 54    Total Protein      6.4 - 8.2 g/dL 7.2    AST      9 - 39 U/L 24    Bilirubin Total      0.0 - 1.2 mg/dL 0.8    ALT      10 - 52 U/L 37    CHOLESTEROL      0 - 199 mg/dL 145    HDL CHOLESTEROL      mg/dL 54.2    Cholesterol/HDL Ratio 2.7    LDL Calculated      <=99 mg/dL 75    VLDL      0 - 40 mg/dL 16    TRIGLYCERIDES      0 - 149 mg/dL 79    Non HDL Cholesterol      0 - 149 mg/dL 91    Albumin, Urine Random      Not established mg/L <7.0    Creatinine, Urine Random      20.0 - 370.0 mg/dL 133.5    Albumin/Creatinine Ratio --    Hemoglobin A1C      See comment % 5.7 (H)    Estimated Average Glucose      Not Established mg/dL 117    Thyroid Stimulating Hormone      0.44 - 3.98 mIU/L 0.99    Vitamin D, 25-Hydroxy, Total      30 - 100 ng/mL 42         10.2024 renal duplex some elevation of velocities at aortorenal bypass (right) proximal segment  cm/sec    9.2024 Echo  Normal LVEF, no LVH  No valve/structural heart disease    9.2024 Stress test  No ischemia at 11.6 Met workload  Highest /82 mm Hg at stage III exercise    Prior imaging   Renal duplex 1.2.2024  Normal velocities in both renal arteries/conduit  Right kidney 11.9, left 11.9  Slightly monophasic flow left renal vein    4.3.2024 Ambulatory BP monitor  51 successful readings  Average ambulatory blood pressure was 120/80 mmHg with a heart rate of 67 beats per  "minute.   The highest SBP and DBP was 139 at 10:40 and 103 at 20:20.   The lowest SBP and DBP was 78 at 9:41 and 42 at 9:41.   From 8 a.m. to 11 p.m., average blood pressure was 122/83 mmHg with average heart rate of 67 beats per minute.   From 11 p.m. to 8 a.m., average blood pressure was 109//63 mmHg with a heart rate of 68 beats per minute.   Patient went to bed at unknown and woke up at unknown.  Patient reported symptoms: None      Impression: Average 24-hour ambulatory blood pressure of 120/80 mmHg indicates that systolic/diastolic blood pressure is adequately controlled. Patient has normal nocturnal BP dipping. No white coat effect.  Recommendation: Management per referring clinician.       7.2023 CTA head/neck  No aneurysms, FMD, dissections    Surgical pathology intimal hyperplasia  A. AORTA:   -- Muscular artery with disordered architecture, see note.                   Note: Clinical correlation is recommended.   B.  RIGHT RENAL ARTERY:   -- Muscular artery with disordered intimal hyperplasia, consistent with clinical diagnosis of \"fibromuscular dysplasia\".   C.  LEFT RENAL ARTERY:  --Muscular artery with disordered intimal hyperplasia, consistent with clinical diagnosis of \"fibromuscular dysplasia.    Echos done 2019 and 2021 (outside studies)  Normal LV function  Trace MR/TR (former seen only on 2019 exam).    Assessment/Plan: 29 y.o. man with renovascular HTN s/p complex revascularization in November, 2023 including rx for left renal aneurysm and right aortorenal bypass.  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 Other degenerative/fibrotic process. He has Alport's syndrome, may or may not at all be related.    BP had been great, now higher.  He saw a big rise when he went on Wellbutrin and he is now off of this. Also ? Of some stenosis at his right renal bypass graft proximal anastomosis. He will let me know how BP trends over next " few weeks as Wellbutrin is washed out. We can escalate lisinopril to 10 mg/day or even 7.5 mg/day.  We will repeat renal duplex to look at graft in April to make sure no progressive restenosis.    He has exertional dyspnea; I don't think he has structural/congenital heart disease. I think aerobic exercise on his treadmill is good.    RTC April, 2025 with renal duplex.  I will also help refer him to an excellent vascular provider in [facility] for follow-up.    Vivi Brown MD

## 2025-02-03 NOTE — LETTER
February 3, 2025     Ekaterina Flowers DO  [address] [facility] [address]    Patient: Patrick Mckeon   YOB: 1995   Date of Visit: 2/3/2025       Dear Dr. Ekaterina Flowers DO:    Thank you for referring Patrick Mckeon to me for evaluation. Below are my notes for this consultation.  If you have questions, please do not hesitate to call me. I look forward to following your patient along with you.       Sincerely,     Vivi Brown MD      CC: Jovany Kelly MD  ______________________________________________________________________________________    02/03/25  1:51 PM    Vascular Medicine Follow-up    MR. Mckeon is a terrific 29 y.o. man seen in follow-up for renovascular HTN.  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     I have been managing his medication.  He is on on lisinopril 5 mg/day.  Since I saw him, BP was actually great, 110s-70s but he was put on Wellbutrin for anxiety + ADHD and BP became uncontrolled, he felt poorly, and HR went up.  He has been off this for 3 weeks; overall feeling better, But Bps still 130s/80s. He is taking aspirin.  He has some exertional dyspnea.      Past Medical History:   Diagnosis Date   • Anxiety    • Depression    • GERD (gastroesophageal reflux disease)    • Hyperlipidemia    • Hypertension    • Renal artery stenosis (CMS-HCC)     Nep: MAN Moreno 8/2023   • Renal disease due to hypertension      Past Surgical History:   Procedure Laterality Date   • CT ABDOMEN PELVIS ANGIOGRAM W AND/OR WO IV CONTRAST  07/03/2023    CT ABDOMEN PELVIS ANGIOGRAM W AND/OR WO IV CONTRAST 7/3/2023 [facility]   • CT ANGIO NECK  07/25/2023    CT NECK ANGIO W AND WO IV CONTRAST 7/25/2023 [facility]   • CT HEAD ANGIO W AND WO IV CONTRAST  07/25/2023    CT HEAD ANGIO W AND WO IV  "CONTRAST 7/25/2023 [facility] CT   • INVASIVE VASCULAR PROCEDURE Bilateral 10/12/2023    Procedure: Upper Extremity Angiogram;  Surgeon: Jovany Kelly MD;  Location: [facility];  Service: Vascular Surgery;  Laterality: Bilateral;   • IR ANGIOGRAM RENAL BILATERAL Bilateral 08/15/2023    IR ANGIOGRAM RENAL BILATERAL 8/15/2023 [facility]   • OTHER SURGICAL HISTORY  11/22/2022    Lipoma excision     Current Outpatient Medications   Medication Sig Dispense Refill   • aspirin 81 mg EC tablet Take 1 tablet (81 mg) by mouth once daily.     • cetirizine (ZyrTEC) 10 mg capsule Take 1 capsule (10 mg) by mouth once daily. 90 capsule 3   • escitalopram (Lexapro) 20 mg tablet Take 1 tablet (20 mg) by mouth once daily. 90 tablet 1   • lisinopril 5 mg tablet Take 1 tablet (5 mg) by mouth once daily. 90 tablet 3   • rosuvastatin (Crestor) 10 mg tablet TAKE 1 TABLET (10 MG) BY MOUTH ONCE DAILY AT BEDTIME. 90 tablet 1   • triamcinolone (Nasacort) 55 mcg nasal inhaler Administer 2 sprays into each nostril once daily.     • chlorhexidine (Hibiclens) 4 % external liquid 3 Applications every 12 hours.       No current facility-administered medications for this visit.     On examination:  Patient Vitals for the past 24 hrs:   BP Pulse Resp SpO2 Height Weight   02/03/25 1320 142/88 -- -- -- -- --   02/03/25 1319 159/74 67 18 97 % 1.791 m (5' 10.5\") 70.5 kg (155 lb 6.4 oz)   He is in no distress  HEENT benign  +S1, S2, RRR; no m/r/g  Breathing non labored  No edema  He was alert, oriented, fluid speech  Appropriate affect    Data reviewed    ECG today NSR at 67 beats per minute  Normal intervals/axis  Non specific T wave abnormality c/w prior    Recent labs  Component      Latest Ref Rng 10/25/2024   WBC      4.4 - 11.3 x10*3/uL 4.7    nRBC      0.0 - 0.0 /100 WBCs 0.0    RBC      4.50 - 5.90 x10*6/uL 5.08    HEMOGLOBIN      13.5 - 17.5 g/dL 14.6    HEMATOCRIT      41.0 - 52.0 % 44.9    MCV      80 - 100 fL 88    MCH      26.0 - " 34.0 pg 28.7    MCHC      32.0 - 36.0 g/dL 32.5    RED CELL DISTRIBUTION WIDTH      11.5 - 14.5 % 12.3    Platelets      150 - 450 x10*3/uL 309    Neutrophils %      40.0 - 80.0 % 45.1    Immature Granulocytes %, Automated      0.0 - 0.9 % 0.2    Lymphocytes %      13.0 - 44.0 % 42.2    Monocytes %      2.0 - 10.0 % 8.2    Eosinophils %      0.0 - 6.0 % 3.7    Basophils %      0.0 - 2.0 % 0.6    Neutrophils Absolute      1.20 - 7.70 x10*3/uL 2.10    Immature Granulocytes Absolute, Automated      0.00 - 0.70 x10*3/uL 0.01    Lymphocytes Absolute      1.20 - 4.80 x10*3/uL 1.96    Monocytes Absolute      0.10 - 1.00 x10*3/uL 0.38    Eosinophils Absolute      0.00 - 0.70 x10*3/uL 0.17    Basophils Absolute      0.00 - 0.10 x10*3/uL 0.03    GLUCOSE      74 - 99 mg/dL 94    SODIUM      136 - 145 mmol/L 139    POTASSIUM      3.5 - 5.3 mmol/L 4.7    CHLORIDE      98 - 107 mmol/L 104    Bicarbonate      21 - 32 mmol/L 27    Anion Gap      10 - 20 mmol/L 13    Blood Urea Nitrogen      6 - 23 mg/dL 10    Creatinine      0.50 - 1.30 mg/dL 0.90    EGFR      >60 mL/min/1.73m*2 >90    Calcium      8.6 - 10.6 mg/dL 9.1    Albumin      3.4 - 5.0 g/dL 4.8    Alkaline Phosphatase      33 - 120 U/L 54    Total Protein      6.4 - 8.2 g/dL 7.2    AST      9 - 39 U/L 24    Bilirubin Total      0.0 - 1.2 mg/dL 0.8    ALT      10 - 52 U/L 37    CHOLESTEROL      0 - 199 mg/dL 145    HDL CHOLESTEROL      mg/dL 54.2    Cholesterol/HDL Ratio 2.7    LDL Calculated      <=99 mg/dL 75    VLDL      0 - 40 mg/dL 16    TRIGLYCERIDES      0 - 149 mg/dL 79    Non HDL Cholesterol      0 - 149 mg/dL 91    Albumin, Urine Random      Not established mg/L <7.0    Creatinine, Urine Random      20.0 - 370.0 mg/dL 133.5    Albumin/Creatinine Ratio --    Hemoglobin A1C      See comment % 5.7 (H)    Estimated Average Glucose      Not Established mg/dL 117    Thyroid Stimulating Hormone      0.44 - 3.98 mIU/L 0.99    Vitamin D, 25-Hydroxy, Total      30 - 100  "ng/mL 42         10.2024 renal duplex some elevation of velocities at aortorenal bypass (right) proximal segment  cm/sec    9.2024 Echo  Normal LVEF, no LVH  No valve/structural heart disease    9.2024 Stress test  No ischemia at 11.6 Met workload  Highest /82 mm Hg at stage III exercise    Prior imaging   Renal duplex 1.2.2024  Normal velocities in both renal arteries/conduit  Right kidney 11.9, left 11.9  Slightly monophasic flow left renal vein    4.3.2024 Ambulatory BP monitor  51 successful readings  Average ambulatory blood pressure was 120/80 mmHg with a heart rate of 67 beats per minute.   The highest SBP and DBP was 139 at 10:40 and 103 at 20:20.   The lowest SBP and DBP was 78 at 9:41 and 42 at 9:41.   From 8 a.m. to 11 p.m., average blood pressure was 122/83 mmHg with average heart rate of 67 beats per minute.   From 11 p.m. to 8 a.m., average blood pressure was 109//63 mmHg with a heart rate of 68 beats per minute.   Patient went to bed at unknown and woke up at unknown.  Patient reported symptoms: None      Impression: Average 24-hour ambulatory blood pressure of 120/80 mmHg indicates that systolic/diastolic blood pressure is adequately controlled. Patient has normal nocturnal BP dipping. No white coat effect.  Recommendation: Management per referring clinician.       7.2023 CTA head/neck  No aneurysms, FMD, dissections    Surgical pathology intimal hyperplasia  A. AORTA:   -- Muscular artery with disordered architecture, see note.                   Note: Clinical correlation is recommended.   B.  RIGHT RENAL ARTERY:   -- Muscular artery with disordered intimal hyperplasia, consistent with clinical diagnosis of \"fibromuscular dysplasia\".   C.  LEFT RENAL ARTERY:  --Muscular artery with disordered intimal hyperplasia, consistent with clinical diagnosis of \"fibromuscular dysplasia.    Echos done 2019 and 2021 (outside studies)  Normal LV function  Trace MR/TR (former seen only on 2019 " exam).    Assessment/Plan: 29 y.o. man with renovascular HTN s/p complex revascularization in November, 2023 including rx for left renal aneurysm and right aortorenal bypass.  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 Other degenerative/fibrotic process. He has Alport's syndrome, may or may not at all be related.    BP had been great, now higher.  He saw a big rise when he went on Wellbutrin and he is now off of this. Also ? Of some stenosis at his right renal bypass graft proximal anastomosis. He will let me know how BP trends over next few weeks as Wellbutrin is washed out. We can escalate lisinopril to 10 mg/day or even 7.5 mg/day.  We will repeat renal duplex to look at graft in April to make sure no progressive restenosis.    He has exertional dyspnea; I don't think he has structural/congenital heart disease. I think aerobic exercise on his treadmill is good.    RTC April, 2025 with renal duplex.  I will also help refer him to an excellent vascular provider in [facility] for follow-up.    Vivi Brown MD

## 2025-02-04 ENCOUNTER — PROCEDURE VISIT (OUTPATIENT)
Dept: UROLOGY | Facility: HOSPITAL | Age: 30
End: 2025-02-04
Payer: COMMERCIAL

## 2025-02-04 VITALS — DIASTOLIC BLOOD PRESSURE: 99 MMHG | HEART RATE: 95 BPM | SYSTOLIC BLOOD PRESSURE: 141 MMHG

## 2025-02-04 DIAGNOSIS — Z98.52 S/P VASECTOMY: Primary | ICD-10-CM

## 2025-02-04 PROCEDURE — 55250 REMOVAL OF SPERM DUCT(S): CPT | Performed by: STUDENT IN AN ORGANIZED HEALTH CARE EDUCATION/TRAINING PROGRAM

## 2025-02-04 NOTE — PROGRESS NOTES
Patient ID: Patrick Mckeon is a 29 y.o. male.    Vasectomy    Date/Time: 2/4/2025 12:58 PM    Performed by: Flavio Perry MD MPH  Authorized by: Flavio Perry MD MPH      Procedure discussed: discussed risks, benefits, and alternatives    Chaperone present: yes    Timeout: timeout called immediately prior to procedure    Prep: patient was prepped and draped in usual sterile fashion    Prep type: Betadine    Anesthesia: local anesthesia used    Local anesthetic: lidocaine without epinephrine    Procedure Details:     Indications: desire for sterilization      Laterality: bilateral      Incisions: 2      Right Vas Deferens:      Divided: yes        Hemoclips applied: yes        Cauterized: yes         Left Vas Deferens:      Divided: yes        Hemoclips applied: yes        Cauterized: yes          Skin closure: suture     Closure suture type: 3-0 chromic gut    Post-Procedure Details:     Pathology sent to lab for analysis: yes      Outcome: patient tolerated procedure well with no complications      Post-procedure interventions: sterile dressing applied and wound care instructions given      Dressing type: antibiotic ointment    Disposition: discharged home in satisfactory condition          Pt had a vasovagal reaction that was managed conservatively   Pt feels better       Plan  Fu in 12 weeks withpost VAS SA

## 2025-02-06 LAB
ATRIAL RATE: 67 BPM
P AXIS: 59 DEGREES
P OFFSET: 192 MS
P ONSET: 146 MS
PR INTERVAL: 142 MS
Q ONSET: 217 MS
QRS COUNT: 11 BEATS
QRS DURATION: 98 MS
QT INTERVAL: 386 MS
QTC CALCULATION(BAZETT): 407 MS
QTC FREDERICIA: 400 MS
R AXIS: 69 DEGREES
T AXIS: 19 DEGREES
T OFFSET: 410 MS
VENTRICULAR RATE: 67 BPM

## 2025-02-10 LAB
LABORATORY COMMENT REPORT: NORMAL
PATH REPORT.FINAL DX SPEC: NORMAL
PATH REPORT.GROSS SPEC: NORMAL
PATH REPORT.RELEVANT HX SPEC: NORMAL
PATH REPORT.TOTAL CANCER: NORMAL

## 2025-02-12 ENCOUNTER — APPOINTMENT (OUTPATIENT)
Dept: PRIMARY CARE | Facility: CLINIC | Age: 30
End: 2025-02-12
Payer: COMMERCIAL

## 2025-02-12 VITALS
HEART RATE: 67 BPM | SYSTOLIC BLOOD PRESSURE: 127 MMHG | WEIGHT: 153 LBS | DIASTOLIC BLOOD PRESSURE: 69 MMHG | TEMPERATURE: 98.2 F | BODY MASS INDEX: 21.64 KG/M2

## 2025-02-12 DIAGNOSIS — F33.1 MODERATE EPISODE OF RECURRENT MAJOR DEPRESSIVE DISORDER: ICD-10-CM

## 2025-02-12 DIAGNOSIS — R73.03 PREDIABETES: ICD-10-CM

## 2025-02-12 DIAGNOSIS — Q87.81 ALPORT SYNDROME (HHS-HCC): ICD-10-CM

## 2025-02-12 DIAGNOSIS — I15.0 RENOVASCULAR HYPERTENSION: Primary | ICD-10-CM

## 2025-02-12 PROBLEM — R00.2 PALPITATIONS: Status: RESOLVED | Noted: 2025-02-03 | Resolved: 2025-02-12

## 2025-02-12 PROBLEM — M35.9 CONNECTIVE TISSUE DISORDER (MULTI): Status: RESOLVED | Noted: 2023-09-30 | Resolved: 2025-02-12

## 2025-02-12 PROBLEM — M31.4: Status: RESOLVED | Noted: 2023-10-17 | Resolved: 2025-02-12

## 2025-02-12 PROCEDURE — 99214 OFFICE O/P EST MOD 30 MIN: CPT | Performed by: INTERNAL MEDICINE

## 2025-02-12 PROCEDURE — 3074F SYST BP LT 130 MM HG: CPT | Performed by: INTERNAL MEDICINE

## 2025-02-12 PROCEDURE — 1036F TOBACCO NON-USER: CPT | Performed by: INTERNAL MEDICINE

## 2025-02-12 PROCEDURE — 3078F DIAST BP <80 MM HG: CPT | Performed by: INTERNAL MEDICINE

## 2025-02-12 RX ORDER — METFORMIN HYDROCHLORIDE 500 MG/1
500 TABLET, EXTENDED RELEASE ORAL
Qty: 90 TABLET | Refills: 0 | Status: SHIPPED | OUTPATIENT
Start: 2025-02-12 | End: 2026-03-19

## 2025-02-12 NOTE — ASSESSMENT & PLAN NOTE
On lisinopril 5 mg, some elevated heart rate and blood pressure response to Wellbutrin now discontinued with residual aftereffects.  Hopefully this will continue to improve as Wellbutrin washes out

## 2025-02-12 NOTE — ASSESSMENT & PLAN NOTE
Improved symptoms on Lexapro 20 mg though resulting in fatigue.  Residual side effects related to Wellbutrin hopefully this will improve with time  Plan to switch Lexapro to evening time to see if this assists with his daytime fatigue         Follow-up in April prior to his move.

## 2025-02-12 NOTE — ASSESSMENT & PLAN NOTE
a/w sensorineural hearing loss (recent hearing test 10/11 wnl) and characteristic ocular findings. Seen by genetics followup every 3-5 years.  monitor for albuminuria or proteinuria (<0.2 ideally) annually or every 6 months if increased for progressive renal failure which is a potential risk.  Audiology surveillance every 6 months

## 2025-02-12 NOTE — PROGRESS NOTES
xSubjective   Patient ID: Patrick Mckeon is a 29 y.o. male who presents for Follow-up.  HPI  29-year-old male here for follow-up visit, last seen in December.      - Finger pain seen by Dr. Doyle no clear cause consider rheumatology  2/25: A1c up to 5.9% from 5.7%  2/25 - seen by Dr. Brown may escalate lisinopril if necessary continue aerobic exercise follow-up in April for renal duplex to look at the graft.        - Depression on Lexapro 20-added Wellbutrin at last visit which resulted in adverse side effects subsequently discontinued. Noted elevated heart rate and blood pressure due to the wellbutrin which still has not yet recovered. Prior to initiation blood pressure readings are in the 120s/80s on average.  - Prediabetes - A1c increased to 5.9% from 5.7%, has been trying to adjust lifestyle, getting starbucks once a week. Once a week has a pancake with syrup. Uses [employer] service.  Has noted a disruption in sleep since discontinuing wellbutrin but also partner moved out 25th of December. Does not snore but at time will wake up hearing a grunting noise, believes this started with Lexapro. Black coffee in the morning and exercising regularly.  Has had a sleep study in the past which was negative. Has noted increased abdominal weight gain.    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 Seen by genetics followup every 3-5 years.  monitor for albuminuria or proteinuria (<0.2 ideally) annually or every 6 months if increased for progressive renal failure which is a potential risk.  Audiology surveillance every 6 months  - Fibromuscular dysplasia - s/p revascularization in November including treatment for left renal artery aneurysm seen by Dr. Garcia of vascular surgery - on aspirin repeat duplex ultrasound in October shows good perfusion recommended followup in 6 months with Dr. Kelly  with repeat ultrasound.   - Renovascular HTN - followed by Dr. Hathaway on lisinopril 5mg   - ADHD - previously on Adderall discontinued, attributes LFT dysfunction due to this, then vyvanse did not work, adverse reaction to Wellbutrin  - HLD - on rosuvastatin 10mg with h/o carotid artery plaque   - Migraine headaches - triggered by uncontrolled allergies. Now resolved.   - Allergies - takes zyrtec and benadryl daily for significant symptoms. a  - Pulsatile tinnitus - resolved. CT angio head and neck unremarkable   -Depth perception changes-recommended follow-up with ophthalmology       Social:   - Lives with wife Gretta and two cats, currently in process to moving to [place]   - Works at [employer], previously at [employer].   - Denies alcohol, tobacco or drug use.   Current Outpatient Medications   Medication Instructions    aspirin 81 mg, Daily    cetirizine (ZYRTEC) 10 mg, oral, Daily    chlorhexidine (Hibiclens) 4 % external liquid 15 mL, Every 12 hours    escitalopram (LEXAPRO) 20 mg, oral, Daily    lisinopril 5 mg, oral, Daily    metFORMIN XR (GLUCOPHAGE-XR) 500 mg, oral, Daily with breakfast, Do not crush, chew, or split.    rosuvastatin (CRESTOR) 10 mg, oral, Nightly    triamcinolone (Nasacort) 55 mcg nasal inhaler 2 sprays, Daily        Objective     /69   Pulse 67   Temp 36.8 °C (98.2 °F)   Wt 69.4 kg (153 lb)   BMI 21.64 kg/m²     Physical Exam  General: Alert and oriented, in no apparent distress   HEENT: No conjunctival erythema, no external facial lesions   Lungs: Breathing comfortably  Skin: No evidence of skin breakdown.  Neuro: AAO x 3, answering questions appropriately, no obvious cranial nerve deficits     Lab Results   Component Value Date    WBC 4.7 10/25/2024    HGB 14.6 10/25/2024    HCT 44.9 10/25/2024     10/25/2024    CHOL 145 10/25/2024    TRIG 79 10/25/2024    HDL 54.2 10/25/2024    ALT 37 10/25/2024    AST 24 10/25/2024     10/25/2024    K 4.7 10/25/2024      10/25/2024    CREATININE 0.90 10/25/2024    BUN 10 10/25/2024    CO2 27 10/25/2024    TSH 0.99 10/25/2024    INR 1.3 (H) 11/29/2023    HGBA1C 5.9 (H) 01/31/2025     Independently reviewed most recent bloodwork        Assessment/Plan     Assessment & Plan  Renovascular hypertension  On lisinopril 5 mg, some elevated heart rate and blood pressure response to Wellbutrin now discontinued with residual aftereffects.  Hopefully this will continue to improve as Wellbutrin washes out         Alport syndrome ([facility])  a/w sensorineural hearing loss (recent hearing test 10/11 wnl) and characteristic ocular findings. Seen by genetics followup every 3-5 years.  monitor for albuminuria or proteinuria (<0.2 ideally) annually or every 6 months if increased for progressive renal failure which is a potential risk.  Audiology surveillance every 6 months       Prediabetes  Extensively discussed, history of this in the past now worsening despite lifestyle interventions, believes it is unlikely to improve with continued lifestyle.  Sample CGM provided to monitor glucose excursions based on current lifestyle, encouraged improvements in sleep patterns if possible.  Increase moderate intensity exercise regimen and resistance training  Given concerns regarding metabolic dysfunction will initiate trial of metformin ER.  Potential side effects and management expectations reviewed including GI side effects B12 deficiency. Renal function within normal limits.  Adjust based on tolerability, provide long-acting formulation  Orders:    metFORMIN XR (Glucophage-XR) 500 mg 24 hr tablet; Take 1 tablet (500 mg) by mouth once daily with breakfast. Do not crush, chew, or split.    Moderate episode of recurrent major depressive disorder  Improved symptoms on Lexapro 20 mg though resulting in fatigue.  Residual side effects related to Wellbutrin hopefully this will improve with time  Plan to switch Lexapro to evening time to see if this assists with his  daytime fatigue         Follow-up in April prior to his move.

## 2025-02-12 NOTE — PATIENT INSTRUCTIONS
Patrick,   Prediabetes   CGM Dexcom7. Stilo is over the counter.   Start Metformin XR 500mg once per day. Take half tablet first with food to see how you feel.   Get regular and consistent sleep   Consider zone 2 exercise   Depression - switch lexapro to evening, see if this helps with symptoms.     See you in April   Followup in April after your visit with Dr. Brown

## 2025-03-24 NOTE — PROGRESS NOTES
"    ADULT AUDIOMETRIC EVALUATION      Name:  Patrick Mckeon  :  1995  Age:  29 y.o.  Date of Evaluation:  3/28/2025    IMPRESSIONS     Today's test results are consistent with normal hearing sensitivity, bilaterally. Patient's high frequency audiometry with high frequency headphones tested from 9,000 Hz - 20,000 Hz indicated hearing sensitivity WNL from 9000-21262 Hz sloping to a profound (assumed) SNHL, bilaterally. Results are stable with previous testing performed on 10/25/2024. Discussed results and recommendations with patient.  Questions were addressed and the patient was encouraged to contact our department should concerns arise.    RECOMMENDATIONS     Continue medical follow up with PCP/ENT.  Return for annual hearing evaluation or sooner should concerns arise.    Time: 8524-5310    HISTORY     Patrick Mckeon is seen today at the request of Ekaterina Flowers DO for an evaluation of hearing. Patient has a known history of Alport Syndrome. Most recently seen on 10/25/2024 where he reported bilateral intermittent tinnitus and difficulty hearing his name in group settings where others are calling for him. Additionally he noted some rare lightheadedness with presyncope lasting a few seconds at a time without specific triggers. Hearing evaluation on that date revealed normal hearing sensitivity, bilaterally. These results did not have significant changes from previous testing. His baseline high frequency audiometry with high frequency headphones tested from 9,000 Hz - 20,000 Hz indicated hearing sensitivity WNL from 9000-25305 Hz sloping to a profound (assumed) SNHL, bilaterally.    Today, patient reported no significant changes in his dizziness, hearing, or tinnitus concerns. However, notes occasional sensation in the right ear described as \"dripping\" or movement of fluid such as drainage. This sensation has gotten better over time and began shortly after a change in medication, however he is " unsure if it is related to this change in medications.    TEST RESULTS     Screening Measures: Risk screenings are completed annually or more frequently as designated upon arrival to an outpatient location  Steadi Fall Risk: One or more falls in the last year? No  Domestic Violence: Do you feel UNSAFE going back to the place you are living? No/Not Indicated  Pain: Are you in any pain (0-10)? 0    Otoscopic Evaluation:  Physical exam to evaluate the outer ear  Right Ear: Clear ear canals.  Left Ear: Clear ear canals.    Tympanometry & Acoustic Reflexes:  Assesses the function of the middle ear and inner ear structures  Right Ear: Tympanometry revealed normal ear canal volume, peak pressure, and compliance (Type A).   Left Ear: Tympanometry revealed normal ear canal volume, peak pressure, and compliance (Type A).     Distortion Product Otoacoustic Emissions: Assesses the cochlear outer hair cell function  Right Ear:  Present OAEs suggesting normal to near normal cochlear outer hair cell function from 2000Hz-8000Hz range.  Left Ear:   Present OAEs suggesting normal to near normal cochlear outer hair cell function from 2000Hz-8000Hz range.    Pure Tone Audiometry: Conventional Audiometry via TDH Headphones with good reliability  Right Ear: Hearing sensitivity WNL.   Left Ear: Hearing sensitivity WNL.     Speech Audiometry:   Right Ear: Speech Reception Threshold (SRT) was obtained at 10 dBHL. Word Recognition scores were excellent (100%) in quiet when words were presented at 55 dBHL.   Left Ear: Speech Reception Threshold (SRT) was obtained at 5 dBHL. Word Recognition scores were excellent (100%) in quiet when words were presented at 55 dBHL.   Binaural Speech in Noise testing: QuickSIN revealed mild degree of SNR loss.     Extended High Frequency Audiometry: Assesses responses from high frequency range to monitor subtle changes    Frequency (Hz) Right Ear Threshold (dB HL) Left Ear Threshold (dB HL)   9,000 5 5    10,000 0 15   11,200 10 25   12,500 20 25   14,000 40 35   16,000 45 60   18,000 NR NR   20,000 NR NR       Testing and interpretation of results completed by Yandel Jameson CCC-A [facility]. It was my pleasure to evaluate this patient.       YANDLE Jameson, CCC-A [facility]  Senior Clinical Vestibular Audiologist      Degree of Hearing Sensitivity Decibel Range   Within Normal Limits (WNL) 0-25   Mild 26-40   Moderate 41-55   Moderately-Severe 56-70   Severe 71-90   Profound 91+      Key   CNT/DNT Could Not Test/Did Not Test   TM Tympanic Membrane   WNL Within Normal Limits   HA Hearing Aid   SNHL Sensorineural Hearing Loss   CHL Conductive Hearing Loss   NIHL Noise-Induced Hearing Loss   ECV Ear Canal Volume   RE/LE Right Ear/Left Ear        AUDIOGRAM

## 2025-03-25 ENCOUNTER — APPOINTMENT (OUTPATIENT)
Dept: OTOLARYNGOLOGY | Facility: CLINIC | Age: 30
End: 2025-03-25
Payer: COMMERCIAL

## 2025-03-28 ENCOUNTER — CLINICAL SUPPORT (OUTPATIENT)
Dept: AUDIOLOGY | Facility: CLINIC | Age: 30
End: 2025-03-28
Payer: COMMERCIAL

## 2025-03-28 DIAGNOSIS — H93.293 ABNORMAL AUDITORY PERCEPTION OF BOTH EARS: Primary | ICD-10-CM

## 2025-03-28 PROCEDURE — 92557 COMPREHENSIVE HEARING TEST: CPT

## 2025-03-28 PROCEDURE — 92567 TYMPANOMETRY: CPT

## 2025-03-28 ASSESSMENT — PAIN SCALES - GENERAL: PAINLEVEL_OUTOF10: 0 - NO PAIN

## 2025-03-28 ASSESSMENT — PAIN - FUNCTIONAL ASSESSMENT: PAIN_FUNCTIONAL_ASSESSMENT: 0-10

## 2025-04-05 LAB
EST. AVERAGE GLUCOSE BLD GHB EST-MCNC: 134 MG/DL
EST. AVERAGE GLUCOSE BLD GHB EST-SCNC: 7.4 MMOL/L
HBA1C MFR BLD: 6.3 % OF TOTAL HGB

## 2025-04-07 ENCOUNTER — APPOINTMENT (OUTPATIENT)
Dept: CARDIOLOGY | Facility: HOSPITAL | Age: 30
End: 2025-04-07
Payer: COMMERCIAL

## 2025-04-07 ENCOUNTER — HOSPITAL ENCOUNTER (OUTPATIENT)
Dept: VASCULAR MEDICINE | Facility: HOSPITAL | Age: 30
Discharge: HOME | End: 2025-04-07
Payer: COMMERCIAL

## 2025-04-07 VITALS
HEIGHT: 70 IN | DIASTOLIC BLOOD PRESSURE: 89 MMHG | WEIGHT: 156.7 LBS | BODY MASS INDEX: 22.43 KG/M2 | HEART RATE: 71 BPM | RESPIRATION RATE: 18 BRPM | OXYGEN SATURATION: 98 % | SYSTOLIC BLOOD PRESSURE: 135 MMHG

## 2025-04-07 DIAGNOSIS — R00.2 PALPITATIONS: ICD-10-CM

## 2025-04-07 DIAGNOSIS — I72.2 ANEURYSM OF RENAL ARTERY (CMS-HCC): ICD-10-CM

## 2025-04-07 DIAGNOSIS — I70.1 RENAL ARTERY STENOSIS (CMS-HCC): ICD-10-CM

## 2025-04-07 PROCEDURE — 3008F BODY MASS INDEX DOCD: CPT | Performed by: INTERNAL MEDICINE

## 2025-04-07 PROCEDURE — 93010 ELECTROCARDIOGRAM REPORT: CPT | Performed by: INTERNAL MEDICINE

## 2025-04-07 PROCEDURE — 93975 VASCULAR STUDY: CPT

## 2025-04-07 PROCEDURE — 93005 ELECTROCARDIOGRAM TRACING: CPT | Performed by: INTERNAL MEDICINE

## 2025-04-07 PROCEDURE — 3075F SYST BP GE 130 - 139MM HG: CPT | Performed by: INTERNAL MEDICINE

## 2025-04-07 PROCEDURE — 93975 VASCULAR STUDY: CPT | Performed by: STUDENT IN AN ORGANIZED HEALTH CARE EDUCATION/TRAINING PROGRAM

## 2025-04-07 PROCEDURE — 99214 OFFICE O/P EST MOD 30 MIN: CPT | Mod: 25 | Performed by: INTERNAL MEDICINE

## 2025-04-07 PROCEDURE — 1036F TOBACCO NON-USER: CPT | Performed by: INTERNAL MEDICINE

## 2025-04-07 PROCEDURE — 3079F DIAST BP 80-89 MM HG: CPT | Performed by: INTERNAL MEDICINE

## 2025-04-07 PROCEDURE — 99214 OFFICE O/P EST MOD 30 MIN: CPT | Performed by: INTERNAL MEDICINE

## 2025-04-07 NOTE — LETTER
April 7, 2025     Ekaterina Flowers DO  [address] [facility] [address]    Patient: Patrick Mckeon   YOB: 1995   Date of Visit: 4/7/2025       Dear Dr. Ekaterina Flowers DO:    It was a pleasure to see Mr. Mckeon today.  Below are my notes from the office visit. If you have questions, please do not hesitate to call me. I look forward to following your patient along with you.       Sincerely,     Vivi Brown MD      CC: Jovany Kelly MD  ______________________________________________________________________________________    04/07/25  1:14 PM    Vascular Medicine Follow-up    MR. Mckeon is a terrific 30 y.o. man seen in follow-up for renovascular HTN.  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     I have been managing his medication.  He is on on lisinopril 5 mg/day.  When I saw him in February, Bps were a little higher ? Related to a trial of Wellbutrin.  HE is off this med. Renal duplex also with ? Of high velocities in aortorenal graft.    Since His last visit, no major events.  He is on metformin for mildly elevated blood sugar/HgB A1c -- he is also on rosuvastatin.    Much stress as he prepares to move to [facility].    Bps a little higher 120s-101/ mm Hg (highest /100 mm Hg).    He is doing fine on aspirin.    Occasional palpitation; better than in past.    Past Medical History:   Diagnosis Date   • Anxiety    • Depression    • GERD (gastroesophageal reflux disease)    • Hyperlipidemia    • Hypertension    • Renal artery stenosis (CMS-HCC)     Nep: MAN Moreno 8/2023   • Renal disease due to hypertension      Past Surgical History:   Procedure Laterality Date   • CT ABDOMEN PELVIS ANGIOGRAM W AND/OR WO IV CONTRAST  07/03/2023    CT ABDOMEN PELVIS ANGIOGRAM W  "AND/OR WO IV CONTRAST 7/3/2023 U CT   • CT ANGIO NECK  07/25/2023    CT NECK ANGIO W AND WO IV CONTRAST 7/25/2023 U CT   • CT HEAD ANGIO W AND WO IV CONTRAST  07/25/2023    CT HEAD ANGIO W AND WO IV CONTRAST 7/25/2023 U CT   • INVASIVE VASCULAR PROCEDURE Bilateral 10/12/2023    Procedure: Upper Extremity Angiogram;  Surgeon: Jovany Kelly MD;  Location: [facility];  Service: Vascular Surgery;  Laterality: Bilateral;   • IR ANGIOGRAM RENAL BILATERAL Bilateral 08/15/2023    IR ANGIOGRAM RENAL BILATERAL 8/15/2023 [facility] [place] [facility]   • OTHER SURGICAL HISTORY  11/22/2022    Lipoma excision     Current Outpatient Medications   Medication Sig Dispense Refill   • aspirin 81 mg EC tablet Take 1 tablet (81 mg) by mouth once daily.     • cetirizine (ZyrTEC) 10 mg capsule Take 1 capsule (10 mg) by mouth once daily. 90 capsule 3   • chlorhexidine (Hibiclens) 4 % external liquid 3 Applications every 12 hours.     • escitalopram (Lexapro) 20 mg tablet Take 1 tablet (20 mg) by mouth once daily. 90 tablet 1   • lisinopril 5 mg tablet Take 1 tablet (5 mg) by mouth once daily. 90 tablet 3   • metFORMIN XR (Glucophage-XR) 500 mg 24 hr tablet Take 1 tablet (500 mg) by mouth once daily with breakfast. Do not crush, chew, or split. 90 tablet 0   • rosuvastatin (Crestor) 10 mg tablet TAKE 1 TABLET (10 MG) BY MOUTH ONCE DAILY AT BEDTIME. 90 tablet 1   • triamcinolone (Nasacort) 55 mcg nasal inhaler Administer 2 sprays into each nostril once daily.       No current facility-administered medications for this visit.     On examination:  Patient Vitals for the past 24 hrs:   BP Pulse Resp SpO2 Height Weight   04/07/25 1240 135/89 -- -- -- -- --   04/07/25 1239 (!) 138/99 71 18 98 % 1.778 m (5' 10\") 71.1 kg (156 lb 11.2 oz)   He is in no distress  HEENT benign  +S1, S2, RRR; no m/r/g  Breathing non labored  Abdominal incision with some areas of mildly atrophic scaring  No edema, brisk UE LE pulses  He was alert, oriented, " "fluid speech  Appropriate affect    Data reviewed    ECG today NSR at 71 beats per minute  Normal intervals/axis  Single PVC  Non specific T wave abnormality c/w prior    Today's renal duplex with now entirely normal velocities at aortorenal bypass proximal segment (lower than aortic velocity) and into both kidneys  Right kidney 11.3, left 12.3 cms    Prior testing  9.2024 Echo  Normal LVEF, no LVH  No valve/structural heart disease    9.2024 Stress test  No ischemia at 11.6 Met workload  Highest /82 mm Hg at stage III exercise    4.3.2024 Ambulatory BP monitor  51 successful readings  Average ambulatory blood pressure was 120/80 mmHg with a heart rate of 67 beats per minute.   The highest SBP and DBP was 139 at 10:40 and 103 at 20:20.   The lowest SBP and DBP was 78 at 9:41 and 42 at 9:41.   From 8 a.m. to 11 p.m., average blood pressure was 122/83 mmHg with average heart rate of 67 beats per minute.   From 11 p.m. to 8 a.m., average blood pressure was 109//63 mmHg with a heart rate of 68 beats per minute.   Patient went to bed at unknown and woke up at unknown.  Patient reported symptoms: None      Impression: Average 24-hour ambulatory blood pressure of 120/80 mmHg indicates that systolic/diastolic blood pressure is adequately controlled. Patient has normal nocturnal BP dipping. No white coat effect.  Recommendation: Management per referring clinician.       7.2023 CTA head/neck  No aneurysms, FMD, dissections    Surgical pathology intimal hyperplasia  A. AORTA:   -- Muscular artery with disordered architecture, see note.                   Note: Clinical correlation is recommended.   B.  RIGHT RENAL ARTERY:   -- Muscular artery with disordered intimal hyperplasia, consistent with clinical diagnosis of \"fibromuscular dysplasia\".   C.  LEFT RENAL ARTERY:  --Muscular artery with disordered intimal hyperplasia, consistent with clinical diagnosis of \"fibromuscular dysplasia.    Echos done 2019 and 2021 (outside " studies)  Normal LV function  Trace MR/TR (former seen only on 2019 exam).    Assessment/Plan: 30 y.o. man with renovascular HTN s/p complex revascularization in November, 2023 including rx for left renal aneurysm and right aortorenal bypass.  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 Other degenerative/fibrotic process. He has Alport's syndrome, may or may not at all be related.    BP is still a little too high.  We will escalate lisinopril to 10 mg/day. I do not have concerns regarding any restenosis of the bypass graft -- velocities on prior study may have been a little high due to vascular angulation. Repeat scan 1 year.    Occasional palpitations and PVC on ECG. He has a structurally normal heart. Could consider cardiac monitor IF this becomes more severe.    Dr. Flowers is working on his metabolic profile.  Some of his blood sugar elevation could be rosuvastatin related. 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    RTC 1 year. IN interim he will establish are with either Dr. Leslie at [facility] or a vascular specialists at [facility] pending his insurance.    I wish him the best in his next life adventure.    Vivi Brown MD

## 2025-04-07 NOTE — PROGRESS NOTES
04/07/25  1:14 PM    Vascular Medicine Follow-up    MR. Mckeon is a terrific 30 y.o. man seen in follow-up for renovascular HTN.  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     I have been managing his medication.  He is on on lisinopril 5 mg/day.  When I saw him in February, Bps were a little higher ? Related to a trial of Wellbutrin.  HE is off this med. Renal duplex also with ? Of high velocities in aortorenal graft.    Since His last visit, no major events.  He is on metformin for mildly elevated blood sugar/HgB A1c -- he is also on rosuvastatin.    Much stress as he prepares to move to [facility].    Bps a little higher 120s-101/ mm Hg (highest /100 mm Hg).    He is doing fine on aspirin.    Occasional palpitation; better than in past.    Past Medical History:   Diagnosis Date    Anxiety     Depression     GERD (gastroesophageal reflux disease)     Hyperlipidemia     Hypertension     Renal artery stenosis (CMS-HCC)     Nep: Fabian Kike, LOV 8/2023    Renal disease due to hypertension      Past Surgical History:   Procedure Laterality Date    CT ABDOMEN PELVIS ANGIOGRAM W AND/OR WO IV CONTRAST  07/03/2023    CT ABDOMEN PELVIS ANGIOGRAM W AND/OR WO IV CONTRAST 7/3/2023 [facility]    CT ANGIO NECK  07/25/2023    CT NECK ANGIO W AND WO IV CONTRAST 7/25/2023 [facility] CT    CT HEAD ANGIO W AND WO IV CONTRAST  07/25/2023    CT HEAD ANGIO W AND WO IV CONTRAST 7/25/2023 [facility] CT    INVASIVE VASCULAR PROCEDURE Bilateral 10/12/2023    Procedure: Upper Extremity Angiogram;  Surgeon: Jovany Kelly MD;  Location: [facility];  Service: Vascular Surgery;  Laterality: Bilateral;    IR ANGIOGRAM RENAL BILATERAL Bilateral 08/15/2023    IR ANGIOGRAM RENAL BILATERAL 8/15/2023 [facility]    OTHER SURGICAL HISTORY   "11/22/2022    Lipoma excision     Current Outpatient Medications   Medication Sig Dispense Refill    aspirin 81 mg EC tablet Take 1 tablet (81 mg) by mouth once daily.      cetirizine (ZyrTEC) 10 mg capsule Take 1 capsule (10 mg) by mouth once daily. 90 capsule 3    chlorhexidine (Hibiclens) 4 % external liquid 3 Applications every 12 hours.      escitalopram (Lexapro) 20 mg tablet Take 1 tablet (20 mg) by mouth once daily. 90 tablet 1    lisinopril 5 mg tablet Take 1 tablet (5 mg) by mouth once daily. 90 tablet 3    metFORMIN XR (Glucophage-XR) 500 mg 24 hr tablet Take 1 tablet (500 mg) by mouth once daily with breakfast. Do not crush, chew, or split. 90 tablet 0    rosuvastatin (Crestor) 10 mg tablet TAKE 1 TABLET (10 MG) BY MOUTH ONCE DAILY AT BEDTIME. 90 tablet 1    triamcinolone (Nasacort) 55 mcg nasal inhaler Administer 2 sprays into each nostril once daily.       No current facility-administered medications for this visit.     On examination:  Patient Vitals for the past 24 hrs:   BP Pulse Resp SpO2 Height Weight   04/07/25 1240 135/89 -- -- -- -- --   04/07/25 1239 (!) 138/99 71 18 98 % 1.778 m (5' 10\") 71.1 kg (156 lb 11.2 oz)   He is in no distress  HEENT benign  +S1, S2, RRR; no m/r/g  Breathing non labored  Abdominal incision with some areas of mildly atrophic scaring  No edema, brisk UE LE pulses  He was alert, oriented, fluid speech  Appropriate affect    Data reviewed    ECG today NSR at 71 beats per minute  Normal intervals/axis  Single PVC  Non specific T wave abnormality c/w prior    Today's renal duplex with now entirely normal velocities at aortorenal bypass proximal segment (lower than aortic velocity) and into both kidneys  Right kidney 11.3, left 12.3 cms    Prior testing  9.2024 Echo  Normal LVEF, no LVH  No valve/structural heart disease    9.2024 Stress test  No ischemia at 11.6 Met workload  Highest /82 mm Hg at stage III exercise    4.3.2024 Ambulatory BP monitor  51 successful " "readings  Average ambulatory blood pressure was 120/80 mmHg with a heart rate of 67 beats per minute.   The highest SBP and DBP was 139 at 10:40 and 103 at 20:20.   The lowest SBP and DBP was 78 at 9:41 and 42 at 9:41.   From 8 a.m. to 11 p.m., average blood pressure was 122/83 mmHg with average heart rate of 67 beats per minute.   From 11 p.m. to 8 a.m., average blood pressure was 109//63 mmHg with a heart rate of 68 beats per minute.   Patient went to bed at unknown and woke up at unknown.  Patient reported symptoms: None      Impression: Average 24-hour ambulatory blood pressure of 120/80 mmHg indicates that systolic/diastolic blood pressure is adequately controlled. Patient has normal nocturnal BP dipping. No white coat effect.  Recommendation: Management per referring clinician.       7.2023 CTA head/neck  No aneurysms, FMD, dissections    Surgical pathology intimal hyperplasia  A. AORTA:   -- Muscular artery with disordered architecture, see note.                   Note: Clinical correlation is recommended.   B.  RIGHT RENAL ARTERY:   -- Muscular artery with disordered intimal hyperplasia, consistent with clinical diagnosis of \"fibromuscular dysplasia\".   C.  LEFT RENAL ARTERY:  --Muscular artery with disordered intimal hyperplasia, consistent with clinical diagnosis of \"fibromuscular dysplasia.    Echos done 2019 and 2021 (outside studies)  Normal LV function  Trace MR/TR (former seen only on 2019 exam).    Assessment/Plan: 30 y.o. man with renovascular HTN s/p complex revascularization in November, 2023 including rx for left renal aneurysm and right aortorenal bypass.  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 Other degenerative/fibrotic process. He has Alport's syndrome, may or may not at all be related.    BP is still a little too high.  We will escalate lisinopril to 10 mg/day. I do not have concerns regarding any restenosis of the " bypass graft -- velocities on prior study may have been a little high due to vascular angulation. Repeat scan 1 year.    Occasional palpitations and PVC on ECG. He has a structurally normal heart. Could consider cardiac monitor IF this becomes more severe.    Dr. Flowers is working on his metabolic profile.  Some of his blood sugar elevation could be rosuvastatin related. 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    RTC 1 year. IN interim he will establish are with either Dr. Leslie at [facility] or a vascular specialists at [facility] pending his insurance.    I wish him the best in his next life adventure.    Vivi Brown MD

## 2025-04-07 NOTE — PROGRESS NOTES
Subjective   Patient ID: Patrick Mckeon is a 30 y.o. male who presents for Follow-up.  HPI  30-year-old male here for follow-up visit, last seen last month.  -Prediabetes-continues to worsen despite initiation of metformin at last visit. Provided for CGM, has been using the Stelo. Has been noting hyperglycemic excursions related to fries, no significant spike with sugary drinks. Has noted significant stress   Initial GERD symptoms resolved with metformin, noted some palpitations after stopping metformin.         - Renovascular HTN - followed by Dr. Hathaway, lisinopril uptitrated to 10 mg yesterday, home blood pressure readings elevated diastolic.   Past medical history  - Depression - Lexapro 20 (wellbutrin- adverse effects, increased BP and HR)  - Alport syndrome/hereditary nephritis - a/w sensorineural hearing loss (recent hearing test 10/11 wnl) and characteristic ocular findings. Seen by genetics followup every 3-5 years.  monitor for albuminuria or proteinuria (<0.2 ideally) annually or every 6 months if increased for progressive renal failure which is a potential risk.  Audiology surveillance every 6 months  - Fibromuscular dysplasia - s/p revascularization in November including treatment for left renal artery aneurysm seen by Dr. Garcia of vascular surgery - on aspirin repeat duplex ultrasound in October shows good perfusion recommended followup in 6 months with Dr. Kelly with repeat ultrasound.   - ADHD - previously on Adderall discontinued, attributes LFT dysfunction due to this, then vyvanse did not work, adverse reaction to Wellbutrin  - HLD - on rosuvastatin 10mg with h/o carotid artery plaque   - Migraine headaches - triggered by uncontrolled allergies. Now resolved.   - Allergies - takes zyrtec and benadryl daily for significant symptoms. a  - Pulsatile tinnitus - resolved. CT angio head and neck unremarkable   -Depth perception changes-recommended follow-up with ophthalmology        Social:   -  "Lives with wife Gretta and two cats, currently in process to moving to [place]   - Works at [employer], previously at [employer].   - Denies alcohol, tobacco or drug use.   Current Outpatient Medications   Medication Instructions    aspirin 81 mg, Daily    cetirizine (ZYRTEC) 10 mg, oral, Daily    chlorhexidine (Hibiclens) 4 % external liquid 15 mL, Every 12 hours    escitalopram (LEXAPRO) 20 mg, oral, Daily    lisinopril 5 mg, oral, Daily    metFORMIN XR (GLUCOPHAGE-XR) 500 mg, oral, Daily with breakfast, Do not crush, chew, or split.    rosuvastatin (CRESTOR) 10 mg, oral, Nightly    triamcinolone (Nasacort) 55 mcg nasal inhaler 2 sprays, Daily        Objective     /73   Pulse 64   Temp 36 °C (96.8 °F) (Temporal)   Ht 1.778 m (5' 10\")   Wt 70.4 kg (155 lb 3.2 oz)   BMI 22.27 kg/m²     Physical Exam  General: Alert and oriented, in no apparent distress   HEENT: No conjunctival erythema, no external facial lesions   Lungs: Breathing comfortably  Skin: No evidence of skin breakdown.  Neuro: AAO x 3, answering questions appropriately, no obvious cranial nerve deficits     Lab Results   Component Value Date    WBC 4.7 10/25/2024    HGB 14.6 10/25/2024    HCT 44.9 10/25/2024     10/25/2024    CHOL 145 10/25/2024    TRIG 79 10/25/2024    HDL 54.2 10/25/2024    ALT 37 10/25/2024    AST 24 10/25/2024     10/25/2024    K 4.7 10/25/2024     10/25/2024    CREATININE 0.90 10/25/2024    BUN 10 10/25/2024    CO2 27 10/25/2024    TSH 0.99 10/25/2024    INR 1.3 (H) 11/29/2023    HGBA1C 6.3 (H) 04/04/2025     Independently reviewed most recent bloodwork        Assessment/Plan     Assessment & Plan  Mixed hyperlipidemia  Stable on current treatment of rosuvastatin 10mg   Continue healthy lifestyle     Orders:    Lipid Panel; Future    Essential hypertension    Orders:    Comprehensive Metabolic Panel; Future    CBC and Auto Differential; Future    TSH with reflex to Free T4 if abnormal; Future    " Albumin-Creatinine Ratio, Urine Random; Future    Aneurysm of renal artery (CMS-HCC)  Seen by vascular, repeat imaging stable recommended continued surveillance          Prediabetes  Extensively reviewed, worsening of A1C despite attempts at lifestyle modification  Believes largely due to stress response over the past several months, hopefully this will continue to improve   Continue metformin xl 500mg daily, hold off on further escalation, may be contributing to minor palpitations.   Continue CGM monitoring, avoid hyperglycemic excursions if possible, work on stress reduction and sleep         Moderate episode of recurrent major depressive disorder  On Lexapro 20mg recommended trial in the evening   Intolerant to wellbutrin          Renovascular hypertension  Diastolic readings in the 80s, has had dose of lisinopril uptitrated            Repeat bloodwork today   Wishing Patrick the very best of luck and success!

## 2025-04-07 NOTE — PATIENT INSTRUCTIONS
Increase Lisinopril to 10mg once daily.  Okay to take Metformin, no vascular contraindication.      See you back in 1 year with renal ultrasound.     Vivi Brown MD  Co-Director, Vascular Center  [facility], [facility]   [facility] Master Clinician in Fibromuscular Dysplasia and Vascular Care  Professor of Medicine  [facility]

## 2025-04-08 ENCOUNTER — APPOINTMENT (OUTPATIENT)
Dept: PRIMARY CARE | Facility: CLINIC | Age: 30
End: 2025-04-08
Payer: COMMERCIAL

## 2025-04-08 VITALS
WEIGHT: 155.2 LBS | SYSTOLIC BLOOD PRESSURE: 118 MMHG | HEIGHT: 70 IN | TEMPERATURE: 96.8 F | DIASTOLIC BLOOD PRESSURE: 73 MMHG | HEART RATE: 64 BPM | BODY MASS INDEX: 22.22 KG/M2

## 2025-04-08 DIAGNOSIS — F33.1 MODERATE EPISODE OF RECURRENT MAJOR DEPRESSIVE DISORDER: ICD-10-CM

## 2025-04-08 DIAGNOSIS — I10 ESSENTIAL HYPERTENSION: ICD-10-CM

## 2025-04-08 DIAGNOSIS — R73.03 PREDIABETES: ICD-10-CM

## 2025-04-08 DIAGNOSIS — E78.2 MIXED HYPERLIPIDEMIA: ICD-10-CM

## 2025-04-08 DIAGNOSIS — I72.2 ANEURYSM OF RENAL ARTERY (CMS-HCC): ICD-10-CM

## 2025-04-08 DIAGNOSIS — I15.0 RENOVASCULAR HYPERTENSION: ICD-10-CM

## 2025-04-08 DIAGNOSIS — Z00.00 ENCOUNTER FOR PREVENTATIVE ADULT HEALTH CARE EXAMINATION: Primary | ICD-10-CM

## 2025-04-08 PROCEDURE — 3078F DIAST BP <80 MM HG: CPT | Performed by: INTERNAL MEDICINE

## 2025-04-08 PROCEDURE — 3008F BODY MASS INDEX DOCD: CPT | Performed by: INTERNAL MEDICINE

## 2025-04-08 PROCEDURE — 99214 OFFICE O/P EST MOD 30 MIN: CPT | Performed by: INTERNAL MEDICINE

## 2025-04-08 PROCEDURE — 1036F TOBACCO NON-USER: CPT | Performed by: INTERNAL MEDICINE

## 2025-04-08 PROCEDURE — 3074F SYST BP LT 130 MM HG: CPT | Performed by: INTERNAL MEDICINE

## 2025-04-08 NOTE — ASSESSMENT & PLAN NOTE
Orders:    Comprehensive Metabolic Panel; Future    CBC and Auto Differential; Future    TSH with reflex to Free T4 if abnormal; Future    Albumin-Creatinine Ratio, Urine Random; Future

## 2025-04-08 NOTE — ASSESSMENT & PLAN NOTE
Extensively reviewed, worsening of A1C despite attempts at lifestyle modification  Believes largely due to stress response over the past several months, hopefully this will continue to improve   Continue metformin xl 500mg daily, hold off on further escalation, may be contributing to minor palpitations.   Continue CGM monitoring, avoid hyperglycemic excursions if possible, work on stress reduction and sleep

## 2025-04-08 NOTE — ASSESSMENT & PLAN NOTE
Stable on current treatment of rosuvastatin 10mg   Continue healthy lifestyle     Orders:    Lipid Panel; Future

## 2025-04-09 LAB
ALBUMIN SERPL-MCNC: 4.9 G/DL (ref 3.6–5.1)
ALBUMIN/CREAT UR: 9 MG/G CREAT
ALP SERPL-CCNC: 59 U/L (ref 36–130)
ALT SERPL-CCNC: 56 U/L (ref 9–46)
ANION GAP SERPL CALCULATED.4IONS-SCNC: 10 MMOL/L (CALC) (ref 7–17)
AST SERPL-CCNC: 37 U/L (ref 10–40)
BASOPHILS # BLD AUTO: 41 CELLS/UL (ref 0–200)
BASOPHILS NFR BLD AUTO: 0.9 %
BILIRUB SERPL-MCNC: 0.6 MG/DL (ref 0.2–1.2)
BUN SERPL-MCNC: 12 MG/DL (ref 7–25)
CALCIUM SERPL-MCNC: 9.5 MG/DL (ref 8.6–10.3)
CHLORIDE SERPL-SCNC: 102 MMOL/L (ref 98–110)
CHOLEST SERPL-MCNC: 161 MG/DL
CHOLEST/HDLC SERPL: 2.9 (CALC)
CO2 SERPL-SCNC: 27 MMOL/L (ref 20–32)
CREAT SERPL-MCNC: 1.01 MG/DL (ref 0.6–1.26)
CREAT UR-MCNC: 206 MG/DL (ref 20–320)
EGFRCR SERPLBLD CKD-EPI 2021: 102 ML/MIN/1.73M2
EOSINOPHIL # BLD AUTO: 252 CELLS/UL (ref 15–500)
EOSINOPHIL NFR BLD AUTO: 5.6 %
ERYTHROCYTE [DISTWIDTH] IN BLOOD BY AUTOMATED COUNT: 12.6 % (ref 11–15)
GLUCOSE SERPL-MCNC: 90 MG/DL (ref 65–99)
HCT VFR BLD AUTO: 44.8 % (ref 38.5–50)
HDLC SERPL-MCNC: 56 MG/DL
HGB BLD-MCNC: 14.7 G/DL (ref 13.2–17.1)
LDLC SERPL CALC-MCNC: 87 MG/DL (CALC)
LYMPHOCYTES # BLD AUTO: 1692 CELLS/UL (ref 850–3900)
LYMPHOCYTES NFR BLD AUTO: 37.6 %
MCH RBC QN AUTO: 28.2 PG (ref 27–33)
MCHC RBC AUTO-ENTMCNC: 32.8 G/DL (ref 32–36)
MCV RBC AUTO: 85.8 FL (ref 80–100)
MICROALBUMIN UR-MCNC: 1.9 MG/DL
MONOCYTES # BLD AUTO: 401 CELLS/UL (ref 200–950)
MONOCYTES NFR BLD AUTO: 8.9 %
NEUTROPHILS # BLD AUTO: 2115 CELLS/UL (ref 1500–7800)
NEUTROPHILS NFR BLD AUTO: 47 %
NONHDLC SERPL-MCNC: 105 MG/DL (CALC)
PLATELET # BLD AUTO: 316 THOUSAND/UL (ref 140–400)
PMV BLD REES-ECKER: 9.9 FL (ref 7.5–12.5)
POTASSIUM SERPL-SCNC: 4.2 MMOL/L (ref 3.5–5.3)
PROT SERPL-MCNC: 7.5 G/DL (ref 6.1–8.1)
RBC # BLD AUTO: 5.22 MILLION/UL (ref 4.2–5.8)
SODIUM SERPL-SCNC: 139 MMOL/L (ref 135–146)
TRIGL SERPL-MCNC: 86 MG/DL
TSH SERPL-ACNC: 1.09 MIU/L (ref 0.4–4.5)
WBC # BLD AUTO: 4.5 THOUSAND/UL (ref 3.8–10.8)

## 2025-04-10 DIAGNOSIS — I10 ESSENTIAL HYPERTENSION: ICD-10-CM

## 2025-04-10 DIAGNOSIS — I70.1 RENAL ARTERY STENOSIS (CMS-HCC): ICD-10-CM

## 2025-04-10 RX ORDER — LISINOPRIL 10 MG/1
10 TABLET ORAL DAILY
Qty: 90 TABLET | Refills: 3 | Status: SHIPPED | OUTPATIENT
Start: 2025-04-10 | End: 2026-04-10

## 2025-04-13 LAB
ATRIAL RATE: 71 BPM
P AXIS: 61 DEGREES
P OFFSET: 194 MS
P ONSET: 142 MS
PR INTERVAL: 150 MS
Q ONSET: 217 MS
QRS COUNT: 11 BEATS
QRS DURATION: 102 MS
QT INTERVAL: 382 MS
QTC CALCULATION(BAZETT): 415 MS
QTC FREDERICIA: 404 MS
R AXIS: 79 DEGREES
T AXIS: 15 DEGREES
T OFFSET: 408 MS
VENTRICULAR RATE: 71 BPM

## 2025-04-15 ENCOUNTER — APPOINTMENT (OUTPATIENT)
Dept: PRIMARY CARE | Facility: CLINIC | Age: 30
End: 2025-04-15
Payer: COMMERCIAL

## 2025-04-25 ENCOUNTER — APPOINTMENT (OUTPATIENT)
Dept: AUDIOLOGY | Facility: CLINIC | Age: 30
End: 2025-04-25
Payer: COMMERCIAL

## 2025-05-10 DIAGNOSIS — R73.03 PREDIABETES: ICD-10-CM

## 2025-05-12 RX ORDER — METFORMIN HYDROCHLORIDE 500 MG/1
TABLET, EXTENDED RELEASE ORAL
Qty: 90 TABLET | Refills: 1 | Status: SHIPPED | OUTPATIENT
Start: 2025-05-12

## 2025-05-20 ENCOUNTER — TELEPHONE (OUTPATIENT)
Dept: PRIMARY CARE | Facility: CLINIC | Age: 30
End: 2025-05-20
Payer: COMMERCIAL

## 2025-05-28 DIAGNOSIS — I10 ESSENTIAL HYPERTENSION: ICD-10-CM

## 2025-05-28 DIAGNOSIS — E78.5 HYPERLIPIDEMIA, UNSPECIFIED HYPERLIPIDEMIA TYPE: ICD-10-CM

## 2025-05-28 DIAGNOSIS — F33.1 MODERATE EPISODE OF RECURRENT MAJOR DEPRESSIVE DISORDER: ICD-10-CM

## 2025-05-28 DIAGNOSIS — I70.1 RENAL ARTERY STENOSIS: ICD-10-CM

## 2025-05-28 DIAGNOSIS — R73.03 PREDIABETES: ICD-10-CM

## 2025-05-28 RX ORDER — ROSUVASTATIN CALCIUM 10 MG/1
10 TABLET, COATED ORAL NIGHTLY
Qty: 90 TABLET | Refills: 1 | Status: SHIPPED | OUTPATIENT
Start: 2025-05-28

## 2025-05-28 RX ORDER — LISINOPRIL 10 MG/1
10 TABLET ORAL DAILY
Qty: 90 TABLET | Refills: 3 | Status: SHIPPED | OUTPATIENT
Start: 2025-05-28 | End: 2026-05-28

## 2025-05-28 RX ORDER — ESCITALOPRAM OXALATE 20 MG/1
20 TABLET ORAL DAILY
Qty: 90 TABLET | Refills: 1 | Status: SHIPPED | OUTPATIENT
Start: 2025-05-28

## 2025-05-28 RX ORDER — METFORMIN HYDROCHLORIDE 500 MG/1
500 TABLET, EXTENDED RELEASE ORAL
Qty: 90 TABLET | Refills: 1 | Status: SHIPPED | OUTPATIENT
Start: 2025-05-28

## (undated) DEVICE — SUTURE, PROLENE, 5-0, 36 IN, C-1, CV-11, BLUE

## (undated) DEVICE — SUTURE, SILK, 2-0, 30 IN, SH, BLACK

## (undated) DEVICE — SUTURE, SILK, 3-0, 18 IN SH/CR, BLACK

## (undated) DEVICE — CATHETER, FOLEY, 3-WAY, 5ML, 16FR, SILICONE, LF

## (undated) DEVICE — INSERT, CLAMP, SURGICAL, SOFT/TRACTION, STEALTH, 5 MM

## (undated) DEVICE — SUTURE, SILK, 0, 24 IN, SUTUPAK, BLACK

## (undated) DEVICE — PUNCH, AORTIC  4.0, 7.5Â€ LENGTHÂ

## (undated) DEVICE — PAD, GROUNDING, ELECTROSURGICAL, DUAL

## (undated) DEVICE — SUTURE, PDS II, 4-0, 27 IN, RB-1 VIL MONO, LF

## (undated) DEVICE — CONTAINER, SPECIMEN, 120 ML, STERILE

## (undated) DEVICE — PROTECTOR, NERVE, ULNAR, PINK

## (undated) DEVICE — SPONGE, LAP, XRAY DECT, 18IN X 18IN, W/MASTER DMT, STERILE

## (undated) DEVICE — SUTURE, PROLENE, 2-0, 36 IN, SH, DA, BLUE

## (undated) DEVICE — DRESSING, MEPILEX, BORDER, HEEL, 8.7 X 9.1 IN

## (undated) DEVICE — SUTURE, GUT, 0, 36 IN, CT-1, BROWN

## (undated) DEVICE — PITCHER, GRADUATE, 32 OZ (1200CC), STERILE

## (undated) DEVICE — SUTURE, VICRYL, 3-0, 27 IN, SH

## (undated) DEVICE — GLOVE, SURGICAL, PROTEXIS PI MICRO, 7.5, PF, LF

## (undated) DEVICE — COVER, BACK TABLE, 65 X 90, HVY REINFORCED

## (undated) DEVICE — STAPLER, SKIN, PLUS, REGULAR, 35

## (undated) DEVICE — DRAIN, PENROSE, 0.5 X 12 IN, LATEX, STERILE

## (undated) DEVICE — SUTURE, VICRYL, 3-0,18 IN, SH, UNDYED

## (undated) DEVICE — SUTURE, SILK, 2-0, TIES, 12-30 IN, BLACK

## (undated) DEVICE — SUTURE, SILK, 3-0, 30 IN, BR SH, BLACK

## (undated) DEVICE — STAPLER, SKIN PROXIMATE, 35 WIDE

## (undated) DEVICE — TOWEL, OR, XRAY DETECT 5 PK, WHITE, 17X26, W/DMT TAG, ST

## (undated) DEVICE — SUTURE, MONO, PDS 11, 54 IN, TP-1, VIOLET

## (undated) DEVICE — CATHETER TRAY, URETHRAL, W/O CATHETER, W/O BAG, LF

## (undated) DEVICE — TAPE, UMBILICAL, 1/8 X 30 IN, MULTIPACK, COTTON, WHITE

## (undated) DEVICE — STATLOCK, FOLEY SECURE, 3-WAY

## (undated) DEVICE — SUTURE, PROLENE, 6-0, 30 IN, RB-2, BLUE

## (undated) DEVICE — DRESSING, ADHESIVE, ISLAND, TELFA, 4 X 10 IN

## (undated) DEVICE — TOWEL, SURGICAL, NEURO, O/R, 16 X 26, BLUE, STERILE

## (undated) DEVICE — BAG, DRAINAGE, URINARY, URINE METER, INFECTION CONTROL, LF, 350ML

## (undated) DEVICE — PLEDGET, PTFE, SOFT, LARGE, 3/8 X 3/16 X 1/16 IN

## (undated) DEVICE — Device

## (undated) DEVICE — COVER, TABLE, UHC

## (undated) DEVICE — SUTURE, PROLENE, 1 X 60IN TP-1, BLUE

## (undated) DEVICE — PLUG, CATHETER

## (undated) DEVICE — NEEDLE, HYPODERMIC, REGULAR WALL, REGULAR BEVEL, 30 G X 0.5 IN

## (undated) DEVICE — COVER, CART, 45 X 27 X 48 IN, CLEAR

## (undated) DEVICE — CLIP, LIGATING, HORIZON, LARGE, TITANIUM

## (undated) DEVICE — SUTURE, MONOCRYL, 4-0, 18 IN, PS2, UNDYED

## (undated) DEVICE — COUNTER, NEEDLE, FOAM BLOCK, W/MAGNET, W/BLADE GUARD, 10 COUNT, RED, LF

## (undated) DEVICE — TRAY, MINOR, SINGLE BASIN, STERILE

## (undated) DEVICE — SHEATH, PINNACLE, W/.038 GW 10CM, 5FR INTRODUCER, 2.5 CM DIALATOR

## (undated) DEVICE — SUTURE, PROLENE, 4-0, TAPER POINT, SH/SH BLUE 36IN

## (undated) DEVICE — SUTURE, PROLENE, 3-0, 48 IN, SH, DA, BLUE

## (undated) DEVICE — LIGASURE, MARYLAND JAW, OPEN DELIVERY

## (undated) DEVICE — SUTURE, PROLENE, 5-0, 24 IN, C1, DA, BLUE

## (undated) DEVICE — MANIFOLD, 4 PORT NEPTUNE STANDARD

## (undated) DEVICE — SEALANT, HEMOSTATIC, FLOSEAL, 10 ML

## (undated) DEVICE — PUNCH,  AORTIC, LONG HANDLE 6MM

## (undated) DEVICE — GOWN, SURGICAL, SMARTGOWN, XLARGE, STERILE

## (undated) DEVICE — TUBE, FEEDING, INFANT, 8 FR, 20IN

## (undated) DEVICE — DRAPE, INCISE, ANTIMICROBIAL, IOBAN 2, STERI DRAPE, 23 X 33 IN, DISPOSABLE, STERILE

## (undated) DEVICE — SPONGE, DISSECTOR, PEANUT, 3/8, STERILE 5 FOAM HOLDER"

## (undated) DEVICE — SUTURE, ETHIBOND EXCEL 1, TAPER POINT CT-1 GREEN 30 INCH

## (undated) DEVICE — DRAPE, SHEET, THREE QUARTER, FAN FOLD, 57 X 77 IN

## (undated) DEVICE — TUBING, ELASTOMERIC, TRANSLUCENT, SILICONE

## (undated) DEVICE — SYRINGE, 60 CC, LUER LOCK, MONOJECT, W/O CAP, LF

## (undated) DEVICE — CLOSURE SYSTEM, VASCULAR, VASCADE, 5 F

## (undated) DEVICE — GEL, ULTRASOUND, AQUASONIC 100, 20 GM, STERILE

## (undated) DEVICE — SUTURE, PROLENE, 6-0, 24 IN, BV1, DA, BLUE

## (undated) DEVICE — SYRINGE, 20 CC, LUER LOCK, MONOJECT, W/O CAP, LF

## (undated) DEVICE — GUIDEWIRE, ANGLE TIP,  .035 DIA, 260 CM, 3 CM TIP"

## (undated) DEVICE — SUTURE, PROLENE, 6-0, 30 IN, C1, DA, BLUE

## (undated) DEVICE — INFLATION KIT, ADVANTAGE, ENCORE 26 (1/BOX)

## (undated) DEVICE — INSERT, CLAMP, SURGICAL, SOFT/TRACTION, STEALTH, 1 MM

## (undated) DEVICE — SUTURE, PROLENE, 5-0, 36 IN, C1, DA, BLUE

## (undated) DEVICE — CATHETER, SOFT VU, NON-BRAID FLUSH, .035, 5FR, 65CM, 6-SIDEHOLE

## (undated) DEVICE — COVER, EQUIPMENT, SOLUTION, SLUSH, 112 X 168 CM, LF, STERILE

## (undated) DEVICE — DRAPE, INSTRUMENT, W/POUCH, STERI DRAPE, 7 X 11 IN, DISPOSABLE, STERILE

## (undated) DEVICE — EXTENSION SET W/MALE LUER LOCK ADAPTER, VOLUME 2.4ML

## (undated) DEVICE — DRAPE, PAD, INSTRUMENT, MAGNETIC, MEDIUM, 10 X 16 IN, DISPOSABLE

## (undated) DEVICE — DRESSING, MEPILEX, BORDER, SACRUM, 8.7 X 9.8 IN

## (undated) DEVICE — CATHETER, 5 FR. 100CM, ANGLE TAPER AT TIP

## (undated) DEVICE — NEEDLE, HYPODERMIC, REGULAR WALL, REGULAR BEVEL, 22 G X 1 IN

## (undated) DEVICE — SUTURE, PROLENE, 6-0, 30 IN, C-1, CV-11, BLUE

## (undated) DEVICE — SUTURE, SILK, 3-0, 30 IN, MULTIPACK, BLACK